# Patient Record
Sex: FEMALE | Race: BLACK OR AFRICAN AMERICAN | NOT HISPANIC OR LATINO | Employment: FULL TIME | URBAN - METROPOLITAN AREA
[De-identification: names, ages, dates, MRNs, and addresses within clinical notes are randomized per-mention and may not be internally consistent; named-entity substitution may affect disease eponyms.]

---

## 2017-04-25 ENCOUNTER — OFFICE VISIT (OUTPATIENT)
Dept: INTERNAL MEDICINE | Facility: CLINIC | Age: 27
End: 2017-04-25
Attending: FAMILY MEDICINE
Payer: MEDICAID

## 2017-04-25 VITALS
HEIGHT: 67 IN | BODY MASS INDEX: 36.61 KG/M2 | WEIGHT: 233.25 LBS | TEMPERATURE: 98 F | HEART RATE: 86 BPM | DIASTOLIC BLOOD PRESSURE: 80 MMHG | SYSTOLIC BLOOD PRESSURE: 98 MMHG

## 2017-04-25 DIAGNOSIS — S16.1XXA NECK STRAIN, INITIAL ENCOUNTER: ICD-10-CM

## 2017-04-25 DIAGNOSIS — Z88.9 DRUG ALLERGY: Primary | ICD-10-CM

## 2017-04-25 PROCEDURE — 99999 PR PBB SHADOW E&M-EST. PATIENT-LVL III: CPT | Mod: PBBFAC,,, | Performed by: FAMILY MEDICINE

## 2017-04-25 PROCEDURE — 99213 OFFICE O/P EST LOW 20 MIN: CPT | Mod: S$PBB,,, | Performed by: FAMILY MEDICINE

## 2017-04-25 PROCEDURE — 99213 OFFICE O/P EST LOW 20 MIN: CPT | Mod: PBBFAC | Performed by: FAMILY MEDICINE

## 2017-04-25 RX ORDER — METHOCARBAMOL 500 MG/1
500 TABLET, FILM COATED ORAL EVERY 8 HOURS PRN
Qty: 20 TABLET | Refills: 0 | Status: SHIPPED | OUTPATIENT
Start: 2017-04-25 | End: 2017-11-30

## 2017-04-25 RX ORDER — FAMCICLOVIR 500 MG/1
TABLET ORAL
Qty: 3 TABLET | Refills: 0 | Status: SHIPPED | OUTPATIENT
Start: 2017-04-25 | End: 2017-11-28

## 2017-04-25 RX ORDER — DIPHENHYDRAMINE HCL 12.5MG/5ML
ELIXIR ORAL EVERY 8 HOURS
COMMUNITY

## 2017-04-25 RX ORDER — AZITHROMYCIN 250 MG/1
TABLET, FILM COATED ORAL
Refills: 0 | COMMUNITY
Start: 2017-04-21 | End: 2017-04-27

## 2017-04-25 NOTE — MR AVS SNAPSHOT
[facility]  [address]  [facility] [address]  Phone: [phone]  Fax: [phone]                  Sarina Ann   2017 9:00 AM   Office Visit    Description:  Female : 1990   Provider:  Anabell Alcantar MD   Department:  [facility]           Reason for Visit     Allergic Reaction     Facial Swelling           Diagnoses this Visit        Comments    Neck strain, initial encounter                To Do List           Goals (5 Years of Data)     None       These Medications        Disp Refills Start End    methocarbamol (ROBAXIN) 500 MG Tab 20 tablet 0 2017     Take 1 tablet (500 mg total) by mouth every 8 (eight) hours as needed. - Oral    Pharmacy: [id number] - [place], [place] - [address] AT [address] Ph #: [phone]       famciclovir (FAMVIR) 500 MG tablet 3 tablet 0 2017     Take 3 tablets po at once for cold sore    Pharmacy: [id number] - [place], [place] - [address] AT [facility] Ph #: [phone]         [facility]     [facility] -  Assistance  Unless otherwise directed by your provider, please contact [facility], our nurse care line that is available for  assistance.     Registered nurses in the [facility] provide: appointment scheduling, clinical advisement, health education, and other advisory services.  Call: [phone] (toll free)               Medications           Message regarding Medications     Verify the changes and/or additions to your medication regime listed below are the same as discussed with your clinician today.  If any of these changes or additions are incorrect, please notify your healthcare provider.        START taking these NEW medications        Refills    famciclovir (FAMVIR) 500 MG tablet 0    Sig: Take 3 tablets po at once for cold sore    Class:  "Normal      STOP taking these medications     ranitidine (ZANTAC) 75 MG tablet Take 1 tablet (75 mg total) by mouth 2 (two) times daily.    benzocaine-menthol (CEPACOL SENSATIONS COOLING) 4-9 mg Lozg 1 lozenge by Mucous Membrane route 4 (four) times daily as needed.           Verify that the below list of medications is an accurate representation of the medications you are currently taking.  If none reported, the list may be blank. If incorrect, please contact your healthcare provider. Carry this list with you in case of emergency.           Current Medications     diphenhydrAMINE (BENADRYL) 12.5 mg/5 mL elixir Take by mouth every 8 (eight) hours.    fluticasone (FLONASE) 50 mcg/actuation nasal spray 1 spray by Each Nare route 2 (two) times daily as needed.    ibuprofen (ADVIL,MOTRIN) 600 MG tablet Take 1 tablet (600 mg total) by mouth every 6 (six) hours as needed for Pain.    naproxen (EC NAPROSYN) 500 MG EC tablet Take 1 tablet (500 mg total) by mouth 2 (two) times daily with meals.    azithromycin (Z-PATRICIA) 250 MG tablet     famciclovir (FAMVIR) 500 MG tablet Take 3 tablets po at once for cold sore    methocarbamol (ROBAXIN) 500 MG Tab Take 1 tablet (500 mg total) by mouth every 8 (eight) hours as needed.           Clinical Reference Information           Your Vitals Were     BP Pulse Temp Height Weight BMI    98/80 (BP Location: Left arm, Patient Position: Sitting, BP Method: Manual) 86 97.9 °F (36.6 °C) (Oral) 5' 7" (1.702 m) 105.8 kg (233 lb 4 oz) 36.53 kg/m2      Blood Pressure          Most Recent Value    BP  98/80      Allergies as of 4/25/2017     No Known Allergies      Immunizations Administered on Date of Encounter - 4/25/2017     None      Language Assistance Services     ATTENTION: Language assistance services are available, free of charge. Please call [phone].      ATENCIÓN: Si sachinla amandamaddi, tiene a agosto disposición servicios gratuitos de asistencia lingüística. Llame al [phone].     RAFAEL " Ý: N?u b?n nói Ti?ng Vi?t, có các d?ch v? h? tr? ngôn ng? mi?n phí dành cho b?n. G?i s? [phone].         [facility] [place], operated by [facility] complies with applicable Federal civil rights laws and does not discriminate on the basis of race, color, national origin, age, disability, or sex.

## 2017-04-25 NOTE — PROGRESS NOTES
Subjective:       Patient ID: Sarina Ann is a 26 y.o. female.    Chief Complaint: Allergic Reaction and Facial Swelling (x 5 days)    HPI Comments: Pt presents today for facial swelling. Per pt, she went to , was given a ceftriaxone shot along with steroids. Subsequently had severe swelling and pt was sent to ED, where she was given benadryl. Pt states that her symptoms improved but still has facial swelling. Pt scared to try anything new and still has some sinus issues. Wonders if she should take the oral abx given by .  Is still taking benadryl ATC. Pt denies any fevers, chills    Allergic Reaction   Pertinent negatives include no chest pain or coughing.     Review of Systems   Constitutional: Negative.    HENT: Positive for congestion, facial swelling, postnasal drip and sinus pressure.    Eyes: Negative.    Respiratory: Negative for cough, chest tightness and shortness of breath.    Cardiovascular: Negative for chest pain, palpitations and leg swelling.   Gastrointestinal: Negative.    Musculoskeletal: Negative.  Negative for joint swelling.   Skin: Negative.    Neurological: Negative for dizziness, weakness, light-headedness and headaches.       Objective:      Physical Exam   Constitutional: She is oriented to person, place, and time. She appears well-developed and well-nourished.   HENT:   Head: Normocephalic and atraumatic.       Right Ear: External ear normal.   Left Ear: External ear normal.   Nose: Nose normal.   Mouth/Throat: Oropharynx is clear and moist. No oropharyngeal exudate.   Eyes: EOM are normal. Pupils are equal, round, and reactive to light.   Neck: Normal range of motion. Neck supple. No thyromegaly present.   Cardiovascular: Normal rate, regular rhythm, normal heart sounds and intact distal pulses.    No murmur heard.  Pulmonary/Chest: Effort normal and breath sounds normal. No respiratory distress.   Abdominal: Soft. Bowel sounds are normal. She exhibits no distension and no  mass. There is no tenderness. There is no rebound and no guarding.   Musculoskeletal: Normal range of motion. She exhibits no edema or tenderness.   Lymphadenopathy:     She has no cervical adenopathy.   Neurological: She is alert and oriented to person, place, and time. She has normal reflexes.   Skin: Skin is warm and dry. No erythema.   Psychiatric: She has a normal mood and affect. Her behavior is normal. Judgment and thought content normal.       Assessment:         2. Sinus congestion  3. Drug reaction  Plan:     drug reaction: explained to pt that i suspect allergy is to abx and not steroid shot. Advised pt to not continue any more abx since her exam was pretty good with some mild sinus congestion due to allergies. Take benadryl, flonase, fluids and NSAIDS prn. Will not give another steroid shot as per pt request. ED prompts d/w pt    F/u with PCP prn

## 2017-04-27 ENCOUNTER — OFFICE VISIT (OUTPATIENT)
Dept: INTERNAL MEDICINE | Facility: CLINIC | Age: 27
End: 2017-04-27
Payer: MEDICAID

## 2017-04-27 VITALS
DIASTOLIC BLOOD PRESSURE: 72 MMHG | SYSTOLIC BLOOD PRESSURE: 98 MMHG | HEIGHT: 64 IN | BODY MASS INDEX: 39.48 KG/M2 | HEART RATE: 98 BPM | WEIGHT: 231.25 LBS | OXYGEN SATURATION: 99 %

## 2017-04-27 DIAGNOSIS — T78.40XD ALLERGIC REACTION, SUBSEQUENT ENCOUNTER: ICD-10-CM

## 2017-04-27 DIAGNOSIS — J06.9 ACUTE URI: Primary | ICD-10-CM

## 2017-04-27 PROCEDURE — 99213 OFFICE O/P EST LOW 20 MIN: CPT | Mod: PBBFAC | Performed by: INTERNAL MEDICINE

## 2017-04-27 PROCEDURE — 99999 PR PBB SHADOW E&M-EST. PATIENT-LVL III: CPT | Mod: PBBFAC,,, | Performed by: INTERNAL MEDICINE

## 2017-04-27 PROCEDURE — 99214 OFFICE O/P EST MOD 30 MIN: CPT | Mod: S$PBB,,, | Performed by: INTERNAL MEDICINE

## 2017-04-27 NOTE — MR AVS SNAPSHOT
[facility]  [address]  [place] [address]  Phone: [phone]  Fax: [phone]                  Sarina Ann   2017 2:00 PM   Office Visit    Description:  Female : 1990   Provider:  Vini Sahu MD   Department:  [facility]           Reason for Visit     Shortness of Breath           Diagnoses this Visit        Comments    Acute URI    -  Primary     Allergic reaction, subsequent encounter                To Do List           Goals (5 Years of Data)     None      Follow-Up and Disposition     Return if symptoms worsen or fail to improve.      [facility]     [facility] Nurse Care Line -  Assistance  Unless otherwise directed by your provider, please contact [facility], our nurse care line that is available for  assistance.     Registered nurses in the [facility] provide: appointment scheduling, clinical advisement, health education, and other advisory services.  Call: [phone] (toll free)               Medications           Message regarding Medications     Verify the changes and/or additions to your medication regime listed below are the same as discussed with your clinician today.  If any of these changes or additions are incorrect, please notify your healthcare provider.        STOP taking these medications     azithromycin (Z-PATRICIA) 250 MG tablet     ibuprofen (ADVIL,MOTRIN) 600 MG tablet Take 1 tablet (600 mg total) by mouth every 6 (six) hours as needed for Pain.    naproxen (EC NAPROSYN) 500 MG EC tablet Take 1 tablet (500 mg total) by mouth 2 (two) times daily with meals.           Verify that the below list of medications is an accurate representation of the medications you are currently taking.  If none reported, the list may be blank. If incorrect, please contact your healthcare provider. Carry this list with you in case of emergency.           Current Medications     diphenhydrAMINE  "(BENADRYL) 12.5 mg/5 mL elixir Take by mouth every 8 (eight) hours.    fluticasone (FLONASE) 50 mcg/actuation nasal spray 1 spray by Each Nare route 2 (two) times daily as needed.    methocarbamol (ROBAXIN) 500 MG Tab Take 1 tablet (500 mg total) by mouth every 8 (eight) hours as needed.    famciclovir (FAMVIR) 500 MG tablet Take 3 tablets po at once for cold sore           Clinical Reference Information           Your Vitals Were     BP Pulse Height Weight Last Period SpO2    98/72 98 5' 4" (1.626 m) 104.9 kg (231 lb 4.2 oz) 04/24/2017 99%    BMI                39.7 kg/m2          Blood Pressure          Most Recent Value    BP  98/72      Allergies as of 4/27/2017     Ceftriaxone      Immunizations Administered on Date of Encounter - 4/27/2017     None      Orders Placed During Today's Visit      Normal Orders This Visit    Ambulatory Referral to Allergy       Instructions      -For the time being please avoid non-steroidal anti-inflammatory medications such as ibuprofen and naproxen.  -Also avoid antibiotics.           Language Assistance Services     ATTENTION: Language assistance services are available, free of charge. Please call [phone].      ATENCIÓN: Si tiffany newton, tiene a agosto disposición servicios gratuitos de asistencia lingüística. Llame al [phone].     RAFAEL Ý: N?u b?n nói Ti?ng Vi?t, có các d?ch v? h? tr? ngôn ng? mi?n phí dành cho b?n. G?i s? [phone].         [facility] complies with applicable Federal civil rights laws and does not discriminate on the basis of race, color, national origin, age, disability, or sex.        "

## 2017-04-27 NOTE — PATIENT INSTRUCTIONS
-For the time being please avoid non-steroidal anti-inflammatory medications such as ibuprofen and naproxen.  -Also avoid antibiotics.

## 2017-04-27 NOTE — PROGRESS NOTES
"Subjective:       Patient ID: Sarina Ann is a 26 y.o. female.    Chief Complaint: Shortness of Breath    HPI Comments:   'I'm just over being sick like this.'      She was seen here also 2d ago - please see that note as well.      Pt has been having URI-type sx which started 7d ago, some of which are ongoing, such as sore throat, L earache and an occasional cough productive of yellow phlegm.      She reports having initially visited an urgent care the day of onset of sx and that she was given both a steroid shot and an antibiotic shot - rocephin.  She reports that she developed sx of an allergic reaction within minutes of receiving the Rocephin (which she had first) and so then went to the ED at [facility] for further tx.  She has been taking frequent Benadryl since then.  Pt says she was 'paralyzed' by the allergic reaction.  She already had some methocarbamol from another time and has been taking that for the soreness in her muscles which she relates to having been 'paralyzed.'  Pt reports temps of 102-105 (not a typo) up until yesterday.  She also gets some sharp, mid-upper chest pain when coughing and also is having some mild LEE.  Nonsmoker.  Denies SOB.          Shortness of Breath       Review of Systems   Constitutional: Negative.    HENT: Negative.    Eyes: Negative.    Respiratory: Positive for shortness of breath.    Cardiovascular: Negative.    Gastrointestinal: Negative.    Genitourinary: Negative.    Musculoskeletal: Negative.    Skin: Negative.    Neurological: Negative.    Psychiatric/Behavioral: Negative.        Objective:       Vitals:    04/27/17 1411   BP: 98/72   Pulse: 98   SpO2: 99%   Weight: 104.9 kg (231 lb 4.2 oz)   Height: 5' 4" (1.626 m)     Physical Exam   Constitutional: She is oriented to person, place, and time. She appears well-developed and well-nourished. No distress.   HENT:   Head: Normocephalic and atraumatic.   Right Ear: Tympanic membrane, external ear and ear canal normal. "   Left Ear: Tympanic membrane, external ear and ear canal normal.   Mouth/Throat: Uvula is midline, oropharynx is clear and moist and mucous membranes are normal. No oropharyngeal exudate or posterior oropharyngeal erythema.   Eyes: Conjunctivae and EOM are normal. Pupils are equal, round, and reactive to light.   Neck: Normal range of motion. Neck supple.   Cardiovascular: Normal rate, regular rhythm and normal heart sounds.  Exam reveals no gallop and no friction rub.    No murmur heard.  Pulmonary/Chest: Effort normal and breath sounds normal. No respiratory distress. She has no wheezes. She has no rhonchi. She has no rales.   Lymphadenopathy:     She has no cervical adenopathy.   Neurological: She is alert and oriented to person, place, and time.   Skin: She is not diaphoretic.       Assessment:       1. Acute URI    2. Allergic reaction, subsequent encounter        Plan:           URI - Normal exam is reassuring.  Presumably viral.  OTC meds PRN.      Allergic reaction - Pt appears to have had an allergic reaction, likely either to Rocephin or perhaps to an NSAID.  Her exam is normal today.  Pt referred again to Allergy.

## 2017-06-21 ENCOUNTER — OFFICE VISIT (OUTPATIENT)
Dept: INTERNAL MEDICINE | Facility: CLINIC | Age: 27
End: 2017-06-21
Attending: FAMILY MEDICINE
Payer: MEDICAID

## 2017-06-21 VITALS
OXYGEN SATURATION: 97 % | BODY MASS INDEX: 37.09 KG/M2 | WEIGHT: 236.31 LBS | SYSTOLIC BLOOD PRESSURE: 92 MMHG | HEART RATE: 85 BPM | HEIGHT: 67 IN | TEMPERATURE: 98 F | DIASTOLIC BLOOD PRESSURE: 74 MMHG

## 2017-06-21 DIAGNOSIS — H60.312 ACUTE DIFFUSE OTITIS EXTERNA OF LEFT EAR: Primary | ICD-10-CM

## 2017-06-21 DIAGNOSIS — Z86.69 HISTORY OF RECURRENT EAR INFECTION: ICD-10-CM

## 2017-06-21 PROCEDURE — 99214 OFFICE O/P EST MOD 30 MIN: CPT | Mod: S$PBB,,, | Performed by: INTERNAL MEDICINE

## 2017-06-21 PROCEDURE — 99999 PR PBB SHADOW E&M-EST. PATIENT-LVL III: CPT | Mod: PBBFAC,,, | Performed by: INTERNAL MEDICINE

## 2017-06-21 PROCEDURE — 99213 OFFICE O/P EST LOW 20 MIN: CPT | Mod: PBBFAC | Performed by: INTERNAL MEDICINE

## 2017-06-21 RX ORDER — TRIAMCINOLONE ACETONIDE 1 MG/G
CREAM TOPICAL 2 TIMES DAILY
Qty: 30 G | Refills: 0 | Status: SHIPPED | OUTPATIENT
Start: 2017-06-21 | End: 2017-11-28

## 2017-06-21 RX ORDER — LORATADINE 10 MG/1
10 TABLET ORAL DAILY
COMMUNITY
End: 2018-03-12

## 2017-06-21 RX ORDER — NEOMYCIN SULFATE, POLYMYXIN B SULFATE AND HYDROCORTISONE 10; 3.5; 1 MG/ML; MG/ML; [USP'U]/ML
3 SUSPENSION/ DROPS AURICULAR (OTIC) 3 TIMES DAILY
Qty: 10 ML | Refills: 0 | Status: SHIPPED | OUTPATIENT
Start: 2017-06-21 | End: 2017-11-28

## 2017-06-21 NOTE — PROGRESS NOTES
"Subjective:       Patient ID: Sarina Ann is a 26 y.o. female.    Chief Complaint: Sinusitis      Pt c/o drainage from L ear for the past 2d.  No pain or itching in her ear.  Her ear feels 'clogged.'  Drainage is clear or faintly yellow.      Pt denies sneezing or cough or sore throat.  She has been having some nasal congestion in the past 2d as well.      She is not taking anything new for these sx.            Review of Systems   Constitutional: Negative.    HENT: Positive for congestion.    Eyes: Negative.    Respiratory: Negative.        Objective:       Vitals:    06/21/17 1028   BP: 92/74   Pulse: 85   Temp: 98.3 °F (36.8 °C)   SpO2: 97%   Weight: 107.2 kg (236 lb 5.3 oz)   Height: 5' 7" (1.702 m)     Physical Exam   Constitutional: She is oriented to person, place, and time. She appears well-developed and well-nourished. No distress.   HENT:   Head: Normocephalic and atraumatic.   Right Ear: Tympanic membrane, external ear and ear canal normal.   Left Ear: Tympanic membrane is scarred. Tympanic membrane is not perforated, not erythematous, not retracted and not bulging.   Mouth/Throat: Uvula is midline, oropharynx is clear and moist and mucous membranes are normal. No oropharyngeal exudate or posterior oropharyngeal erythema.   R ear - There is some dried, crusted material on outer part of ear, as well as a shallow erosion at outer edge of canal, just inferior and posterior to tragus.  There is also similar material in external canal, along with mild inflammation of the canal walls   Eyes: Conjunctivae and EOM are normal. Pupils are equal, round, and reactive to light.   Neck: Normal range of motion. Neck supple.   Cardiovascular: Normal rate, regular rhythm and normal heart sounds.  Exam reveals no gallop and no friction rub.    No murmur heard.  Pulmonary/Chest: Effort normal and breath sounds normal. No respiratory distress. She has no wheezes. She has no rhonchi. She has no rales.   Lymphadenopathy: "     She has no cervical adenopathy.   Neurological: She is alert and oriented to person, place, and time.   Skin: She is not diaphoretic.       Assessment:       1. Acute diffuse otitis externa of left ear    2. History of recurrent ear infection        Plan:           L otitis externa - Pt was given drops for this, as well as TAC for the shallow erosion right outside of her canal and next to base of tragus.  Pt reports recurrent infections w/L ear and was referred to ENT for these.

## 2017-10-26 ENCOUNTER — OFFICE VISIT (OUTPATIENT)
Dept: INTERNAL MEDICINE | Facility: CLINIC | Age: 27
End: 2017-10-26
Payer: MEDICAID

## 2017-10-26 VITALS
HEART RATE: 105 BPM | TEMPERATURE: 98 F | OXYGEN SATURATION: 98 % | BODY MASS INDEX: 36.21 KG/M2 | WEIGHT: 230.69 LBS | DIASTOLIC BLOOD PRESSURE: 72 MMHG | HEIGHT: 67 IN | SYSTOLIC BLOOD PRESSURE: 90 MMHG

## 2017-10-26 DIAGNOSIS — B96.89 BACTERIAL SINUSITIS: Primary | ICD-10-CM

## 2017-10-26 DIAGNOSIS — J32.9 BACTERIAL SINUSITIS: Primary | ICD-10-CM

## 2017-10-26 DIAGNOSIS — J30.89 NON-SEASONAL ALLERGIC RHINITIS, UNSPECIFIED CHRONICITY, UNSPECIFIED TRIGGER: ICD-10-CM

## 2017-10-26 PROCEDURE — 99999 PR PBB SHADOW E&M-EST. PATIENT-LVL IV: CPT | Mod: PBBFAC,,, | Performed by: NURSE PRACTITIONER

## 2017-10-26 PROCEDURE — 99213 OFFICE O/P EST LOW 20 MIN: CPT | Mod: S$PBB,,, | Performed by: NURSE PRACTITIONER

## 2017-10-26 PROCEDURE — 99214 OFFICE O/P EST MOD 30 MIN: CPT | Mod: PBBFAC | Performed by: NURSE PRACTITIONER

## 2017-10-26 RX ORDER — AZITHROMYCIN 250 MG/1
TABLET, FILM COATED ORAL
Qty: 6 TABLET | Refills: 0 | Status: SHIPPED | OUTPATIENT
Start: 2017-10-26 | End: 2017-10-26 | Stop reason: SDUPTHER

## 2017-10-26 RX ORDER — AZITHROMYCIN 250 MG/1
TABLET, FILM COATED ORAL
Qty: 6 TABLET | Refills: 0 | Status: SHIPPED | OUTPATIENT
Start: 2017-10-26 | End: 2017-10-31

## 2017-10-26 NOTE — PROGRESS NOTES
Subjective:       Patient ID: Sarina Ann is a 26 y.o. female.    Chief Complaint: URI    Sarina Ann seen in urgent care for URI symptoms x 3 weeks.     Reports headaaches, sinus pressure and pain, sore throat, post nasal drip, and fevers tmax 101 at home. Pt has known allergic rhinitis poorly controlled on daily Zyrtec and Flonase. Admits she has not been taking Zyrtec and takes Flonase once to twice daily. Was referred to Allergist by PCP before but did not think it was necessary to go. Denies chest pains, SOB, or LEE. No sick contacts, recent travel, or abx use.       URI    This is a new problem. The current episode started 1 to 4 weeks ago. The problem has been gradually worsening. The maximum temperature recorded prior to her arrival was 101 - 101.9 F. The fever has been present for 3 to 4 days. Associated symptoms include congestion, ear pain, headaches, a plugged ear sensation, rhinorrhea, sinus pain and a sore throat. Pertinent negatives include no chest pain, joint pain, joint swelling, nausea, sneezing, vomiting or wheezing. She has tried NSAIDs for the symptoms. The treatment provided mild relief.     Review of Systems   Constitutional: Positive for appetite change, chills and fever.   HENT: Positive for congestion, ear pain, postnasal drip, rhinorrhea, sinus pain, sinus pressure and sore throat. Negative for ear discharge, facial swelling, nosebleeds, sneezing and trouble swallowing.    Eyes: Negative for photophobia.   Respiratory: Negative for chest tightness, shortness of breath and wheezing.    Cardiovascular: Negative for chest pain, palpitations and leg swelling.   Gastrointestinal: Negative for nausea and vomiting.   Genitourinary: Negative.    Musculoskeletal: Negative for arthralgias, joint pain and myalgias.   Neurological: Positive for headaches. Negative for dizziness, weakness and light-headedness.   Psychiatric/Behavioral: Negative for dysphoric mood.       Objective:       Physical Exam   Constitutional: She is oriented to person, place, and time. She appears well-developed and well-nourished.   HENT:   Head: Normocephalic and atraumatic.   Right Ear: Hearing, tympanic membrane, external ear and ear canal normal.   Left Ear: Hearing, tympanic membrane, external ear and ear canal normal.   Nose: Mucosal edema, rhinorrhea and sinus tenderness present. Right sinus exhibits maxillary sinus tenderness and frontal sinus tenderness. Left sinus exhibits maxillary sinus tenderness and frontal sinus tenderness.   Mouth/Throat: Oropharynx is clear and moist. Mucous membranes are cyanotic. No oropharyngeal exudate, posterior oropharyngeal edema or posterior oropharyngeal erythema. Tonsils are 1+ on the right. Tonsils are 1+ on the left. No tonsillar exudate.   Eyes: Conjunctivae and EOM are normal. Pupils are equal, round, and reactive to light. Right eye exhibits no discharge. Left eye exhibits no discharge. No scleral icterus.   Neck: Normal range of motion. Neck supple.   Cardiovascular: Normal rate, regular rhythm, normal heart sounds and intact distal pulses.    Pulmonary/Chest: Effort normal and breath sounds normal. No respiratory distress.   Abdominal: Soft. Bowel sounds are normal.   Musculoskeletal: Normal range of motion.   Lymphadenopathy:     She has no cervical adenopathy.   Neurological: She is alert and oriented to person, place, and time.   Skin: Skin is warm and dry. Capillary refill takes less than 2 seconds.   Psychiatric: Her behavior is normal.   Vitals reviewed.      Assessment:       1. Bacterial sinusitis    2. Non-seasonal allergic rhinitis, unspecified chronicity, unspecified trigger        Plan:       Explicit discussion regarding compliance with allergy meds and Allergist referral  Resume OTC antihistamine and Flonase BID  Zpak; discussed proper use and side effects with patient  F/u PCP failure to improve

## 2017-10-30 ENCOUNTER — NURSE TRIAGE (OUTPATIENT)
Dept: ADMINISTRATIVE | Facility: CLINIC | Age: 27
End: 2017-10-30

## 2017-10-30 ENCOUNTER — HOSPITAL ENCOUNTER (EMERGENCY)
Facility: OTHER | Age: 27
Discharge: HOME | End: 2017-10-30
Attending: EMERGENCY MEDICINE
Payer: MEDICAID

## 2017-10-30 VITALS
BODY MASS INDEX: 35.94 KG/M2 | WEIGHT: 229 LBS | OXYGEN SATURATION: 100 % | TEMPERATURE: 99 F | HEART RATE: 104 BPM | SYSTOLIC BLOOD PRESSURE: 115 MMHG | HEIGHT: 67 IN | RESPIRATION RATE: 18 BRPM | DIASTOLIC BLOOD PRESSURE: 70 MMHG

## 2017-10-30 DIAGNOSIS — K75.9 HEPATITIS: Primary | ICD-10-CM

## 2017-10-30 DIAGNOSIS — J06.9 VIRAL URI: ICD-10-CM

## 2017-10-30 DIAGNOSIS — R11.2 NON-INTRACTABLE VOMITING WITH NAUSEA, UNSPECIFIED VOMITING TYPE: ICD-10-CM

## 2017-10-30 LAB
ALBUMIN SERPL BCP-MCNC: 3.1 G/DL
ALP SERPL-CCNC: 142 U/L
ALT SERPL W/O P-5'-P-CCNC: 566 U/L
ANION GAP SERPL CALC-SCNC: 8 MMOL/L
AST SERPL-CCNC: 436 U/L
B-HCG UR QL: NEGATIVE
BASOPHILS # BLD AUTO: 0.13 K/UL
BASOPHILS NFR BLD: 1.5 %
BILIRUB SERPL-MCNC: 0.8 MG/DL
BILIRUB UR QL STRIP: NEGATIVE
BUN SERPL-MCNC: 5 MG/DL
CALCIUM SERPL-MCNC: 9 MG/DL
CHLORIDE SERPL-SCNC: 101 MMOL/L
CLARITY UR: CLEAR
CO2 SERPL-SCNC: 26 MMOL/L
COLOR UR: YELLOW
CREAT SERPL-MCNC: 0.9 MG/DL
CTP QC/QA: YES
DIFFERENTIAL METHOD: ABNORMAL
EOSINOPHIL # BLD AUTO: 0 K/UL
EOSINOPHIL NFR BLD: 0.4 %
ERYTHROCYTE [DISTWIDTH] IN BLOOD BY AUTOMATED COUNT: 14.4 %
EST. GFR  (AFRICAN AMERICAN): >60 ML/MIN/1.73 M^2
EST. GFR  (NON AFRICAN AMERICAN): >60 ML/MIN/1.73 M^2
FLUAV AG SPEC QL IA: NEGATIVE
FLUBV AG SPEC QL IA: NEGATIVE
GLUCOSE SERPL-MCNC: 90 MG/DL
GLUCOSE UR QL STRIP: NEGATIVE
HCT VFR BLD AUTO: 39.5 %
HETEROPH AB SERPL QL IA: NEGATIVE
HGB BLD-MCNC: 12.8 G/DL
HGB UR QL STRIP: NEGATIVE
KETONES UR QL STRIP: NEGATIVE
LEUKOCYTE ESTERASE UR QL STRIP: NEGATIVE
LIPASE SERPL-CCNC: 15 U/L
LYMPHOCYTES # BLD AUTO: 4.6 K/UL
LYMPHOCYTES NFR BLD: 50.8 %
MCH RBC QN AUTO: 29.6 PG
MCHC RBC AUTO-ENTMCNC: 32.4 G/DL
MCV RBC AUTO: 91 FL
MONOCYTES # BLD AUTO: 0.6 K/UL
MONOCYTES NFR BLD: 6.6 %
NEUTROPHILS # BLD AUTO: 3.6 K/UL
NEUTROPHILS NFR BLD: 40.7 %
NITRITE UR QL STRIP: NEGATIVE
PH UR STRIP: 6 [PH] (ref 5–8)
PLATELET # BLD AUTO: 222 K/UL
PMV BLD AUTO: 9.8 FL
POTASSIUM SERPL-SCNC: 4 MMOL/L
PROT SERPL-MCNC: 7.5 G/DL
PROT UR QL STRIP: NEGATIVE
RBC # BLD AUTO: 4.32 M/UL
SODIUM SERPL-SCNC: 135 MMOL/L
SP GR UR STRIP: 1.01 (ref 1–1.03)
SPECIMEN SOURCE: NORMAL
URN SPEC COLLECT METH UR: ABNORMAL
UROBILINOGEN UR STRIP-ACNC: ABNORMAL EU/DL
WBC # BLD AUTO: 8.96 K/UL

## 2017-10-30 PROCEDURE — 63600175 PHARM REV CODE 636 W HCPCS: Performed by: PHYSICIAN ASSISTANT

## 2017-10-30 PROCEDURE — 81025 URINE PREGNANCY TEST: CPT | Performed by: EMERGENCY MEDICINE

## 2017-10-30 PROCEDURE — 96374 THER/PROPH/DIAG INJ IV PUSH: CPT

## 2017-10-30 PROCEDURE — 87400 INFLUENZA A/B EACH AG IA: CPT | Mod: 59

## 2017-10-30 PROCEDURE — 36415 COLL VENOUS BLD VENIPUNCTURE: CPT

## 2017-10-30 PROCEDURE — 80053 COMPREHEN METABOLIC PANEL: CPT

## 2017-10-30 PROCEDURE — 96361 HYDRATE IV INFUSION ADD-ON: CPT

## 2017-10-30 PROCEDURE — 85025 COMPLETE CBC W/AUTO DIFF WBC: CPT

## 2017-10-30 PROCEDURE — 86308 HETEROPHILE ANTIBODY SCREEN: CPT

## 2017-10-30 PROCEDURE — 81003 URINALYSIS AUTO W/O SCOPE: CPT

## 2017-10-30 PROCEDURE — 83690 ASSAY OF LIPASE: CPT

## 2017-10-30 PROCEDURE — 25000003 PHARM REV CODE 250: Performed by: PHYSICIAN ASSISTANT

## 2017-10-30 PROCEDURE — 99284 EMERGENCY DEPT VISIT MOD MDM: CPT | Mod: 25

## 2017-10-30 PROCEDURE — 80074 ACUTE HEPATITIS PANEL: CPT

## 2017-10-30 RX ORDER — FAMOTIDINE 20 MG/1
20 TABLET, FILM COATED ORAL 2 TIMES DAILY
Qty: 20 TABLET | Refills: 0 | Status: SHIPPED | OUTPATIENT
Start: 2017-10-30 | End: 2017-11-28

## 2017-10-30 RX ORDER — ONDANSETRON 4 MG/1
4 TABLET, FILM COATED ORAL EVERY 6 HOURS
Qty: 12 TABLET | Refills: 0 | Status: SHIPPED | OUTPATIENT
Start: 2017-10-30 | End: 2017-11-28

## 2017-10-30 RX ORDER — ONDANSETRON 2 MG/ML
4 INJECTION INTRAMUSCULAR; INTRAVENOUS
Status: COMPLETED | OUTPATIENT
Start: 2017-10-30 | End: 2017-10-30

## 2017-10-30 RX ADMIN — ONDANSETRON HYDROCHLORIDE 4 MG: 2 INJECTION INTRAMUSCULAR; INTRAVENOUS at 12:10

## 2017-10-30 RX ADMIN — SODIUM CHLORIDE 1000 ML: 0.9 INJECTION, SOLUTION INTRAVENOUS at 12:10

## 2017-10-30 NOTE — ED TRIAGE NOTES
Pt reports cough, congestion, fevers ( 103.4 this morning), n/v and body aches since Thursday. She was prescribed a z-pac on Thursday for a sinus infection without improvement in symptoms. Fever resolved with ibuprofen.

## 2017-10-30 NOTE — TELEPHONE ENCOUNTER
Reason for Disposition   MILD to MODERATE vomiting (e.g., 1-5 times/day) and lasts > 48 hours (2 days)    Protocols used: ST VOMITING-A-OH    Pt started taking a Zpak a few days ago for a sinus infection. Since then she has been throwing up everything but is urinating appropriately. Pt denies constant abdominal pain. Also reports fever on and off for couple of days Advised to see MD today

## 2017-10-30 NOTE — ED PROVIDER NOTES
Encounter Date: 10/30/2017       History     Chief Complaint   Patient presents with    General Illness     pt reports headache, abd pain, n/v, sinus congsetion since Saturday. Started Z-pack on Thursday for sinus infection. reports fever of 103.4 on 1am last night relieved with ibuprofen     Patient is a 26-year-old female with no significant past medical history who presents to the ED with vomiting and abdominal pain.  She states she was diagnosed with sinusitis last week and was given a Z-Jamir that she did not finish taking.  She states 3 days ago she developed epigastric pain and fever.  She then had subsequent nausea and vomiting for the past 3 days.  She states she is unable to tolerate fluids and solids.  She denies hematemesis.  She reports a TMax of 103.4 this morning and states she took Tylenol at approximately 5 AM this morning. She states she had to leave work today secondary to vomiting.  She states her congestion and sore throat is resolved.      The history is provided by the patient.     Review of patient's allergies indicates:   Allergen Reactions    Ceftriaxone Edema     No past medical history on file.  Past Surgical History:   Procedure Laterality Date    NO PAST SURGERIES       Family History   Problem Relation Age of Onset    Colon cancer Father     Diabetes Mother     Hypertension Mother     Breast cancer Neg Hx     Cancer Neg Hx     Eclampsia Neg Hx     Miscarriages / Stillbirths Neg Hx     Ovarian cancer Neg Hx      labor Neg Hx     Stroke Neg Hx      Social History   Substance Use Topics    Smoking status: Never Smoker    Smokeless tobacco: Not on file    Alcohol use Yes      Comment: social     Review of Systems    Physical Exam     Initial Vitals [10/30/17 1111]   BP Pulse Resp Temp SpO2   119/78 (!) 116 18 98.2 °F (36.8 °C) 99 %      MAP       91.67             Physical Exam    Constitutional: She is cooperative.   Obese, -American female in no acute  distress.  She is nontoxic appearing.  He speaks in clear, full sentences.   HENT:   Head: Normocephalic and atraumatic.   Dry mucous membranes.  No tonsillar swelling or exudates.  Uvula is midline.  TM is without erythema, bulging or effusion bilaterally.  Erythematous nasal turbinates.   Eyes: EOM are normal. Pupils are equal, round, and reactive to light.   No conjunctival pallor.   Neck: Normal range of motion. Neck supple.   Cardiovascular: Regular rhythm and intact distal pulses.   Tachycardic at 115 bpm   Pulmonary/Chest: Breath sounds normal. She has no wheezes. She has no rhonchi. She has no rales.   Abdominal: Soft. Bowel sounds are normal.   Tenderness to palpation in epigastric region.  Negative Bond sign.  No rebound tenderness or guarding.   Musculoskeletal: Normal range of motion. She exhibits no edema.   Neurological: She is alert. She has normal strength. No cranial nerve deficit. GCS eye subscore is 4. GCS verbal subscore is 5. GCS motor subscore is 6.   Skin: Skin is warm and dry. No rash noted.   Psychiatric: She has a normal mood and affect. Her behavior is normal.         ED Course   Procedures  Labs Reviewed   URINALYSIS - Abnormal; Notable for the following:        Result Value    Urobilinogen, UA 2.0-3.0 (*)     All other components within normal limits   CBC W/ AUTO DIFFERENTIAL - Abnormal; Notable for the following:     Lymph% 50.8 (*)     All other components within normal limits   COMPREHENSIVE METABOLIC PANEL - Abnormal; Notable for the following:     Sodium 135 (*)     BUN, Bld 5 (*)     Albumin 3.1 (*)     Alkaline Phosphatase 142 (*)      (*)      (*)     All other components within normal limits   INFLUENZA A AND B ANTIGEN   LIPASE   HEPATITIS PANEL, ACUTE   HETEROPHILE AB SCREEN   HETEROPHILE AB SCREEN   HEPATITIS PANEL, ACUTE   POCT URINE PREGNANCY         Imaging Results          US Abdomen Limited (Final result)  Result time 10/30/17 14:24:35    Final result by  Edilia Bartlett MD (10/30/17 14:24:35)                 Impression:        No gallstones or evidence of acute cholecystitis.      Electronically signed by: EDILIA BARTLETT MD, MD  Date:     10/30/17  Time:    14:24              Narrative:    Comparison: None.    Findings: Right upper quadrant ultrasound performed.  The liver measures 17 cm in length and is homogeneous in echogenicity.  Common bile duct is within normal limits at 0.4 cm.  No gallstones, gallbladder wall thickening, pericholecystic fluid or focal tenderness over the gallbladder.  The visualized portions of the pancreas, IVC and abdominal aorta are within normal limits.  The right kidney is normal in length measuring 10.8cm. No right sided hydronephrosis or renal masses identified.   No ascites in the imaged region.                                 Medical Decision Making:   Initial Assessment:   Urgent evaluation of a 26 y.o. female presenting to the emergency department complaining of abdominal pain and vomitng. Patient is afebrile, nontoxic appearing and hemodynamically stable.  ED Management:  Patient appears mildly dehydrated.  I'll give her IVF and Zofran.  I will obtain basic labs.  She is mildly tachycardic and afebrile.  Lab work reveals no leukocytosis and no significant electrolyte abnormalities.  Her LFTs and alkaline phosphatase are elevated.  Lipase is within normal limits.  Influenza is negative.  Urinalysis is negative for UTI but does reveal urobilinogen. RUQ ultrasound does not reveal gallstones or acute cholecystitis.  The patient's tachycardia and symptoms improved interventions here in the ED.  She remained afebrile here. I will add on a Monospot and hepatitis panel.  I have advised her to take ibuprofen instead of Tylenol for her fever.  I have given her specific return precautions.  She has no point with her PCP next week.  I have discussed the patient with the attending thoroughly and he/she agrees to the treatment and plan.   Other:    I have discussed this case with another health care provider.  This note was created using Dragon Medical Dictation. There may be typographical errors secondary to dictation.               Attending Attestation:     Physician Attestation Statement for NP/PA:   I have conducted a face to face encounter with this patient in addition to the NP/PA, due to    Other NP/PA Attestation Additions:    History of Present Illness: 26-year-old female presents with complaint of abdominal pain with recent nausea and vomiting after viral URI.   Physical Exam: Nontoxic, well-hydrated appearance.    PA states on her exam abdominal tenderness in epigastrium but no Bond's.   Medical Decision Making: Labs show hepatitis, normal lipase, normal right upper quadrant ultrasound.  Patient admits she's been taking Tylenol for fever, no recent alcohol use.  I'm concerned for viral hepatitis.  Acute hepatitis panel and Monospot sent and pending at time of discharge.  She has follow-up scheduled with PCP in 10 days, can follow-up these results and repeat LFTs.  She is advised to return for any new or worsening symptoms in the interim.                 ED Course      Clinical Impression:     1. Hepatitis    2. Viral URI    3. Non-intractable vomiting with nausea, unspecified vomiting type                             CHUCKIE Arevalo-C  10/30/17 5194       Etelvina Phipps MD  10/30/17 1608

## 2017-10-30 NOTE — ED NOTES
"AAOx4, pt c/o 5/10 pain to abdomen, Pt states., "I just feel nauseated", Resp. Even & unlabored, skin turgor good, Call light within reach, POC updated.  "

## 2017-10-31 LAB
HAV IGM SERPL QL IA: NEGATIVE
HBV CORE IGM SERPL QL IA: NEGATIVE
HBV SURFACE AG SERPL QL IA: NEGATIVE
HCV AB SERPL QL IA: NEGATIVE

## 2017-11-15 ENCOUNTER — TELEPHONE (OUTPATIENT)
Dept: INTERNAL MEDICINE | Facility: CLINIC | Age: 27
End: 2017-11-15

## 2017-11-28 ENCOUNTER — OFFICE VISIT (OUTPATIENT)
Dept: ALLERGY | Facility: CLINIC | Age: 27
End: 2017-11-28
Payer: MEDICAID

## 2017-11-28 VITALS — WEIGHT: 229.75 LBS | BODY MASS INDEX: 36.06 KG/M2 | HEIGHT: 67 IN | HEART RATE: 60 BPM

## 2017-11-28 DIAGNOSIS — T78.3XXA ANGIOEDEMA, INITIAL ENCOUNTER: ICD-10-CM

## 2017-11-28 DIAGNOSIS — Z86.19 FREQUENT INFECTIONS: ICD-10-CM

## 2017-11-28 DIAGNOSIS — J31.0 CHRONIC RHINITIS, UNSPECIFIED TYPE: Primary | ICD-10-CM

## 2017-11-28 DIAGNOSIS — H10.423 SIMPLE CHRONIC CONJUNCTIVITIS OF BOTH EYES: ICD-10-CM

## 2017-11-28 PROCEDURE — 99213 OFFICE O/P EST LOW 20 MIN: CPT | Mod: PBBFAC | Performed by: ALLERGY & IMMUNOLOGY

## 2017-11-28 PROCEDURE — 99999 PR PBB SHADOW E&M-EST. PATIENT-LVL III: CPT | Mod: PBBFAC,,, | Performed by: ALLERGY & IMMUNOLOGY

## 2017-11-28 PROCEDURE — 99204 OFFICE O/P NEW MOD 45 MIN: CPT | Mod: S$PBB,,, | Performed by: ALLERGY & IMMUNOLOGY

## 2017-11-28 NOTE — LETTER
November 28, 2017      Irina Jara, NP  [address]  [address]  [facility] [address]           [facility]  [address]  [facility] [address]  Phone: [phone]  Fax: [phone]          Patient: Sarina Ann   MR Number: [id number]   YOB: 1990   Date of Visit: 11/28/2017       Dear Irina Jara:    Thank you for referring Sarina Ann to me for evaluation. Attached you will find relevant portions of my assessment and plan of care.    If you have questions, please do not hesitate to call me. I look forward to following Sarina Ann along with you.    Sincerely,    ZHENG Wagner III, MD    Enclosure  CC:  No Recipients    If you would like to receive this communication electronically, please contact [email or web address] or [phone] to request more information on [employer] access.    For providers and/or their staff who would like to refer a patient to [facility], please contact us through our one-stop-shop provider referral line, [facility], at [phone].    If you feel you have received this communication in error or would no longer like to receive these types of communications, please e-mail [email or web address]

## 2017-11-29 NOTE — PROGRESS NOTES
"Sarina Ann is referred by LEIDY Jara for a consult regarding a possible allergic reaction. She is here alone.    She says that she gets frequent upper respiratory infections. She has about 3 "sinus infections" a year. This past year she has had 3 or 4 episodes of strep pharyngitis that have been positive by culture.    She usually goes to the [facility] urgent care center on [address] that is no longer open.    Last April, she had another "sinus infection". She presented to [facility] and received IM steroids and Rocephin.    She developed "stomach tightening" and was watched for. Of time. She was released and she got in her car she started having tingling of her face. She then could not move her mouth and her "hands locked up". She used Elva to call her mother who then called the urgent care center. They came to her car and brought her back into the clinic by wheelchair.    She then became "paralyzed all over". She was aware of her surroundings but could not move.     She was transported to [facility] where she was evaluated and discharged that day. She is not aware of any diagnosis that was made. Her "paralysis" did resolve.    She then saw Dr. Alcantar on April 25, 2017 with a history of mild swelling for 5 days. Upper lip swelling was noted on her exam. She was advised to avoid further antibiotics.    Her symptoms and swelling did resolve with Benadryl. She has not needed since. She has had no further lip swelling. She denies any urticaria.    She again developed another "sinusitis" and was prescribed a Z-Jamir on October 26, 2017. After 3 days of taking this she developed some "stomach tightening" and tingling of her face. She discontinued this antibiotic.    She was seen in the [place] emergency room with abdominal pain and vomiting. She was diagnosed with dehydration and treated with fluids. Her liver enzymes were elevated at that time. She has not had these repeated.    She is seeing Dr. Giuseppe Campo as " "her primary care physician on Thursday.    In addition she has pruritus of the nose, eyes, ears, and throat, eye tearing, sneezing, and sore throats. She takes OTC antihistamines with improvement. She does have Flonase but does not use regularly. Her symptoms are worse with dust, grass, and strong odors. They are worse in spring.    She denies any cough, wheezing, or shortness of breath. She denies any history of asthma.    For ROS, FH, SH please see Allergy and Asthma Questionnaire dated today.    Some relevant pertinent positives:    Review of Systems:  She denies any indigestion or heartburn. She has recurrent constipation.    Family History: Negative for atopic disease.    Home environment: She has lived in the same house for the past 19 years. There was no water damage. There is no evidence of mold. There is no carpeting in the bedroom. There are no pets.    Social History: She is a nonsmoker. She is a .    Physical Examination:  General: Well-developed, well-nourished, no acute distress.  Head: No sinus tenderness.  Eyes: Conjunctivae:  No bulbar or palpebral conjunctival injection.  Ears: EAC's clear.  TM's clear.  No pre-auricular nodes.  Nose: Nasal Mucosa:  Pink.  Septum: No apparent deviation.  Turbinates:  No significant edema.  Polyps/Mass:  None visible.  Teeth/Gums:  No bleeding noted.  Oropharynx: No exudates.  Neck: Supple without thyromegaly. No cervical lymphadenopathy.    Respiratory/Chest: Effort: Good.  Auscultation:  Clear bilaterally.  Cardiovascular:  No murmur, rubs, or gallop heard.   GI:  Non-tender.  No masses.  No organomegaly.  Extremities:  No swelling.  No cyanosis, clubbing, or edema.  Skin: Good turgor.  No urticaria or angioedema.  Neuro/Psych: Oriented x 3.    Assessment:  1. Chronic rhinitis, consider allergic.  2. Chronic conjunctivitis, consider allergic.  3. History of angioedema, probably secondary to Rocephin.  4. History of "paralysis", consider conversion " reaction or adverse reaction to Rocephin.  5. Recurrent upper respiratory infections and history of recurrent strep pharyngitis.  6. Recent elevated liver enzymes of uncertain etiology.    Recommendations:  1. Laboratory as ordered.  2. Avoid Rocephin.  3. Obtain records from [facility] urgent care center and [facility].  4. IM evaluation as scheduled.  5. Return to clinic in 2 weeks.

## 2017-11-30 ENCOUNTER — OFFICE VISIT (OUTPATIENT)
Dept: INTERNAL MEDICINE | Facility: CLINIC | Age: 27
End: 2017-11-30
Payer: MEDICAID

## 2017-11-30 VITALS
HEART RATE: 87 BPM | HEIGHT: 66 IN | DIASTOLIC BLOOD PRESSURE: 70 MMHG | BODY MASS INDEX: 36.21 KG/M2 | SYSTOLIC BLOOD PRESSURE: 98 MMHG | WEIGHT: 225.31 LBS

## 2017-11-30 DIAGNOSIS — Z13.6 ENCOUNTER FOR LIPID SCREENING FOR CARDIOVASCULAR DISEASE: ICD-10-CM

## 2017-11-30 DIAGNOSIS — R79.89 ELEVATED LFTS: Primary | ICD-10-CM

## 2017-11-30 DIAGNOSIS — Z13.220 ENCOUNTER FOR LIPID SCREENING FOR CARDIOVASCULAR DISEASE: ICD-10-CM

## 2017-11-30 DIAGNOSIS — R10.2 VAGINAL PAIN: ICD-10-CM

## 2017-11-30 PROCEDURE — 99213 OFFICE O/P EST LOW 20 MIN: CPT | Mod: S$PBB,,, | Performed by: INTERNAL MEDICINE

## 2017-11-30 PROCEDURE — 99213 OFFICE O/P EST LOW 20 MIN: CPT | Mod: PBBFAC | Performed by: INTERNAL MEDICINE

## 2017-11-30 PROCEDURE — 99999 PR PBB SHADOW E&M-EST. PATIENT-LVL III: CPT | Mod: PBBFAC,,, | Performed by: INTERNAL MEDICINE

## 2017-11-30 NOTE — PROGRESS NOTES
"Subjective:       Patient ID: Sarina Ann is a 27 y.o. female.    Chief Complaint: Abnormal Lab    Pt is here for f/u from ED where she was seen for viral gastroenteritis but found to have elevated LFTs. She has since had repeat labs and were normal. She feels well and has no c/o currently except for "boil" in vaginal area present for a couple of days. No fever or drainage. Has not seen GYN yet.            Review of Systems   Constitutional: Negative for fatigue and unexpected weight change.   Eyes: Negative for visual disturbance.   Respiratory: Negative for shortness of breath.    Cardiovascular: Negative for chest pain.   Neurological: Negative for numbness and headaches.       Objective:      Physical Exam   Constitutional: She is oriented to person, place, and time. She appears well-developed and well-nourished.   HENT:   Head: Normocephalic and atraumatic.   Eyes: EOM are normal. Pupils are equal, round, and reactive to light.   Neck: Carotid bruit is not present. No thyroid mass and no thyromegaly present.   Cardiovascular: Normal rate, regular rhythm, S1 normal, S2 normal and normal heart sounds.    No murmur heard.  Pulmonary/Chest: Effort normal and breath sounds normal. She has no wheezes.   Abdominal: Soft. Bowel sounds are normal. She exhibits no distension and no mass. There is no hepatosplenomegaly. There is no tenderness.   Lymphadenopathy:     She has no cervical adenopathy.   Neurological: She is alert and oriented to person, place, and time. No cranial nerve deficit.   Psychiatric: She has a normal mood and affect. Judgment normal.       Assessment:       1. Elevated LFTs    2. Encounter for lipid screening for cardiovascular disease    3. Vaginal pain        Plan:       1. Elevated LFTs normalized  2. GYN referral       "

## 2017-12-06 ENCOUNTER — LAB VISIT (OUTPATIENT)
Dept: LAB | Facility: OTHER | Age: 27
End: 2017-12-06
Attending: INTERNAL MEDICINE
Payer: MEDICAID

## 2017-12-06 DIAGNOSIS — Z13.220 ENCOUNTER FOR LIPID SCREENING FOR CARDIOVASCULAR DISEASE: ICD-10-CM

## 2017-12-06 DIAGNOSIS — Z13.6 ENCOUNTER FOR LIPID SCREENING FOR CARDIOVASCULAR DISEASE: ICD-10-CM

## 2017-12-06 LAB
CHOLEST SERPL-MCNC: 146 MG/DL
CHOLEST/HDLC SERPL: 3.9 {RATIO}
HDLC SERPL-MCNC: 37 MG/DL
HDLC SERPL: 25.3 %
LDLC SERPL CALC-MCNC: 94 MG/DL
NONHDLC SERPL-MCNC: 109 MG/DL
TRIGL SERPL-MCNC: 75 MG/DL

## 2017-12-06 PROCEDURE — 36415 COLL VENOUS BLD VENIPUNCTURE: CPT

## 2017-12-06 PROCEDURE — 80061 LIPID PANEL: CPT

## 2017-12-13 ENCOUNTER — OFFICE VISIT (OUTPATIENT)
Dept: UROGYNECOLOGY | Facility: CLINIC | Age: 27
End: 2017-12-13
Payer: MEDICAID

## 2017-12-13 VITALS
BODY MASS INDEX: 34.81 KG/M2 | HEIGHT: 67 IN | DIASTOLIC BLOOD PRESSURE: 72 MMHG | SYSTOLIC BLOOD PRESSURE: 100 MMHG | WEIGHT: 221.81 LBS

## 2017-12-13 DIAGNOSIS — Z12.4 ENCOUNTER FOR SCREENING FOR CERVICAL CANCER: Primary | ICD-10-CM

## 2017-12-13 DIAGNOSIS — N90.89: ICD-10-CM

## 2017-12-13 DIAGNOSIS — Z01.419 WELL WOMAN EXAM: ICD-10-CM

## 2017-12-13 PROCEDURE — 99999 PR PBB SHADOW E&M-EST. PATIENT-LVL III: CPT | Mod: PBBFAC,,, | Performed by: NURSE PRACTITIONER

## 2017-12-13 PROCEDURE — 99213 OFFICE O/P EST LOW 20 MIN: CPT | Mod: PBBFAC | Performed by: NURSE PRACTITIONER

## 2017-12-13 PROCEDURE — 88175 CYTOPATH C/V AUTO FLUID REDO: CPT

## 2017-12-13 PROCEDURE — 99395 PREV VISIT EST AGE 18-39: CPT | Mod: S$PBB,,, | Performed by: NURSE PRACTITIONER

## 2017-12-13 NOTE — PATIENT INSTRUCTIONS
1. Well woman  --pap today  --takes 2-3 weeks for results.    2. Caruncle  --warm compresses  --call for increase in size, discharge, or increase in temperature    3. RTC 1 year

## 2017-12-13 NOTE — LETTER
December 16, 2017      Morris Campo MD  [address]  [facility] [address]           [facility] [address] [place] [address]  Phone: [phone]          Patient: Sarina Ann   MR Number: [id number]   YOB: 1990   Date of Visit: 12/13/2017       Dear Dr. Morris Campo:    Thank you for referring Sarina Ann to me for evaluation. Attached you will find relevant portions of my assessment and plan of care.    If you have questions, please do not hesitate to call me. I look forward to following Sarina Ann along with you.    Sincerely,    Judy Adair, NP    Enclosure  CC:  No Recipients    If you would like to receive this communication electronically, please contact [email or web address] or [phone] to request more information on [email or web address] Link access.    For providers and/or their staff who would like to refer a patient to [facility], please contact us through our one-stop-shop provider referral line, [facility] , at [phone].    If you feel you have received this communication in error or would no longer like to receive these types of communications, please e-mail [email or web address]

## 2017-12-13 NOTE — PROGRESS NOTES
"2017    SUBJECTIVE:   27 y.o. female for annual exam.    History reviewed. No pertinent past medical history.    Past Surgical History:   Procedure Laterality Date    NO PAST SURGERIES         Current Outpatient Prescriptions   Medication Sig Dispense Refill    diphenhydrAMINE (BENADRYL) 12.5 mg/5 mL elixir Take by mouth every 8 (eight) hours.      fluticasone (FLONASE) 50 mcg/actuation nasal spray 1 spray by Each Nare route 2 (two) times daily as needed. 15 g 0    loratadine (CLARITIN) 10 mg tablet Take 10 mg by mouth once daily.       No current facility-administered medications for this visit.      Allergies: Ceftriaxone   Patient's last menstrual period was 2017.     Using condoms to prevent pregnancy.    OB History      Para Term  AB Living    0 0 0 0 0 0    SAB TAB Ectopic Multiple Live Births    0 0 0 0              Well Woman:  Pap:2015 normal  Mammo:n/a  Colonoscopy:n/a  Dexa:n/a    Family History  Family History   Problem Relation Age of Onset    Colon cancer Father     Cancer Father 46     colon ca    Diabetes Mother     Hypertension Mother     Breast cancer Neg Hx     Eclampsia Neg Hx     Miscarriages / Stillbirths Neg Hx     Ovarian cancer Neg Hx      labor Neg Hx     Stroke Neg Hx       Colon CA: Yes - father-- age 48  Breast CA: No  GYN CA: No   CA: No      ROS:  Feeling well.   No dyspnea or chest pain on exertion.    No abdominal pain, change in bowel habits, black or bloody stools.    No urinary tract symptoms.   GYN ROS: normal menses, no abnormal bleeding, pelvic pain or discharge, no breast pain or new or enlarging lumps on self exam.   No neurological complaints.    OBJECTIVE:   The patient appears well, alert, oriented x 3, in no distress.  /72 (BP Location: Right arm, Patient Position: Sitting)   Ht 5' 7" (1.702 m)   Wt 100.6 kg (221 lb 12.5 oz)   LMP 2017   BMI 34.74 kg/m²   ENT normal.  Neck supple. No adenopathy or " thyromegaly. SNEHAL.   Lungs are clear, good air entry, no wheezes, rhonchi or rales.   S1 and S2 normal, no murmurs, regular rate and rhythm.   Abdomen soft without tenderness, guarding, mass or organomegaly.   Extremities show no edema, normal peripheral pulses.   Neurological is normal, no focal findings.    BREAST EXAM: breasts appear normal, no suspicious masses, no skin or nipple changes or axillary nodes    PELVIC EXAM:   VULVA: normal appearing vulva with no masses, tenderness or lesions, caruncle noted on R labia near introitus  VAGINA: normal appearing vagina with normal color and discharge, no lesions,  CERVIX: normal appearing cervix without discharge or lesions,   UTERUS: uterus is normal size, shape, consistency and nontender,   ADNEXA: no masses,     ASSESSMENT:   1. Encounter for screening for cervical cancer   Liquid-based pap smear, screening   2. Well woman exam     3. Caruncle of labium         PLAN:   1. Well woman  --pap today  --takes 2-3 weeks for results.    2. Caruncle  --warm compresses  --call for increase in size, discharge, or increase in temperature    3. RTC 1 year        30 minutes were spent in face to face time with this patient  75 % of this time was spent in counseling and/or coordination of care  Judy MAIN [facility]  Division of Female Pelvic Medicine and Reconstructive Surgery  Department of Obstetrics & Gynecology

## 2017-12-19 ENCOUNTER — OFFICE VISIT (OUTPATIENT)
Dept: ALLERGY | Facility: CLINIC | Age: 27
End: 2017-12-19
Payer: MEDICAID

## 2017-12-19 VITALS
DIASTOLIC BLOOD PRESSURE: 80 MMHG | HEIGHT: 67 IN | SYSTOLIC BLOOD PRESSURE: 100 MMHG | WEIGHT: 225.31 LBS | BODY MASS INDEX: 35.36 KG/M2 | HEART RATE: 72 BPM

## 2017-12-19 DIAGNOSIS — H10.13 ALLERGIC CONJUNCTIVITIS, BILATERAL: ICD-10-CM

## 2017-12-19 DIAGNOSIS — J30.89 CHRONIC NON-SEASONAL ALLERGIC RHINITIS, UNSPECIFIED TRIGGER: Primary | ICD-10-CM

## 2017-12-19 DIAGNOSIS — R76.0 ABNORMAL ANTIBODY TITER: ICD-10-CM

## 2017-12-19 PROCEDURE — 99214 OFFICE O/P EST MOD 30 MIN: CPT | Mod: S$PBB,,, | Performed by: ALLERGY & IMMUNOLOGY

## 2017-12-19 PROCEDURE — 99999 PR PBB SHADOW E&M-EST. PATIENT-LVL III: CPT | Mod: PBBFAC,,, | Performed by: ALLERGY & IMMUNOLOGY

## 2017-12-19 PROCEDURE — 90732 PPSV23 VACC 2 YRS+ SUBQ/IM: CPT | Mod: PBBFAC

## 2017-12-19 PROCEDURE — 99213 OFFICE O/P EST LOW 20 MIN: CPT | Mod: PBBFAC | Performed by: ALLERGY & IMMUNOLOGY

## 2017-12-19 NOTE — PROGRESS NOTES
Sarina Ann returns to clinic today for continued evaluation of a possible allergic reaction and chronic rhinitis. She is here alone. She was last seen November 28, 2017.    Since her last visit, she has not had any further reactions. Her records from [facility] and [facility] have not yet been received.    She does continue to have mild recurrent rhinitis with pruritus of the nose, eyes, ears, and throat, ear fullness, and clear rhinorrhea.    Her symptoms are worse around cats and dogs.    She denies any cough, wheezing, or shortness of breath. She denies any history of asthma.    She says she gets frequent strep throats. She may have had this several times this year.    [facility] PEQ ALLERGY QUESTIONNAIRE SHORT 12/19/2017   Are you taking any new medications since your last visit? No   Constitution: No symptoms   Head or facial pain: No symptoms   Eyes: No symptoms   Ears: Fullness   Nose: Post nasal drip   Throat: Frequent clearing   Sinuses: No symptoms   Lungs: No symptoms   Skin: No symptoms   Cardiovascular: No symptoms   Gastrointestinal: No symptoms   Genital/ urinary Frequency of urination   Musculoskeletal: No symptoms   Neurologic: No symptoms   Endocrine: No symptoms   Hematologic: No symptoms   He  Physical Examination:  General: Well-developed, well-nourished, no acute distress.  Head: No sinus tenderness.  Eyes: Conjunctivae:  No bulbar or palpebral conjunctival injection.  Ears: EAC's clear.  TM's clear.  No pre-auricular nodes.  Nose: Nasal Mucosa:  Pink.  Septum: No apparent deviation.  Turbinates:  No significant edema.  Polyps/Mass:  None visible.  Teeth/Gums:  No bleeding noted.  Oropharynx: No exudates.  Neck: Supple without thyromegaly. No cervical lymphadenopathy.    Respiratory/Chest: Effort: Good.  Auscultation:  Clear bilaterally.  Skin: Good turgor.  No urticaria or angioedema.  Neuro/Psych: Oriented x 3.    Laboratory 11/28/2017:  IgE level: 358.  ImmunoCAP:  Class IV: Dust mites,  "Blossom.  Class III: Anup, dog.  Class II: Manning elder, cat.  Class I: Bermuda, English plantain, ragweed.  IgA: 299.  Ig.  IgM: 4031.  Pneumococcal titers: Not protective.  CBC: Normal.  CMP: Normal.    Assessment:  1. Allergic rhinitis.  2. Allergic conjunctivitis.  3. History of angioedema, probably secondary to Rocephin.  4. History of "paralysis", consider conversion reaction or adverse reaction to Rocephin.  5. Recurrent upper respiratory infections and history of recurrent strep pharyngitis with low pneumococcal titers.  6. Recent elevated liver enzymes of uncertain etiology, resolved.    Recommendations:  1. House dust mite avoidance.  2. OTC antihistamines as needed.  3. Start fluticasone 2 sprays each nostril daily in early March.  4. Pneumovax today.  5. Repeat titers in 6 weeks.  6. Return to clinic in 2 months.    Allergic mechanisms and treatment options were reviewed in detail. House dust mite avoidance was reviewed. Handouts were given.    "

## 2017-12-26 ENCOUNTER — PATIENT MESSAGE (OUTPATIENT)
Dept: UROGYNECOLOGY | Facility: CLINIC | Age: 27
End: 2017-12-26

## 2018-01-03 ENCOUNTER — HOSPITAL ENCOUNTER (EMERGENCY)
Facility: OTHER | Age: 28
Discharge: HOME | End: 2018-01-03
Attending: EMERGENCY MEDICINE
Payer: MEDICAID

## 2018-01-03 ENCOUNTER — OFFICE VISIT (OUTPATIENT)
Dept: UROGYNECOLOGY | Facility: CLINIC | Age: 28
End: 2018-01-03
Attending: PEDIATRICS
Payer: MEDICAID

## 2018-01-03 VITALS
TEMPERATURE: 98 F | DIASTOLIC BLOOD PRESSURE: 70 MMHG | RESPIRATION RATE: 18 BRPM | HEIGHT: 67 IN | OXYGEN SATURATION: 99 % | BODY MASS INDEX: 34.69 KG/M2 | HEART RATE: 88 BPM | SYSTOLIC BLOOD PRESSURE: 123 MMHG | WEIGHT: 221 LBS

## 2018-01-03 VITALS
HEIGHT: 67 IN | BODY MASS INDEX: 34.81 KG/M2 | DIASTOLIC BLOOD PRESSURE: 74 MMHG | WEIGHT: 221.81 LBS | SYSTOLIC BLOOD PRESSURE: 108 MMHG

## 2018-01-03 DIAGNOSIS — N89.8 VAGINAL DISCHARGE: Primary | ICD-10-CM

## 2018-01-03 DIAGNOSIS — Z20.2 POSSIBLE EXPOSURE TO STD: Primary | ICD-10-CM

## 2018-01-03 DIAGNOSIS — Z11.3 SCREENING EXAMINATION FOR STD (SEXUALLY TRANSMITTED DISEASE): ICD-10-CM

## 2018-01-03 LAB
B-HCG UR QL: NEGATIVE
BACTERIA #/AREA URNS HPF: ABNORMAL /HPF
BILIRUB UR QL STRIP: NEGATIVE
CLARITY UR: ABNORMAL
COLOR UR: YELLOW
CTP QC/QA: YES
GLUCOSE UR QL STRIP: NEGATIVE
HGB UR QL STRIP: NEGATIVE
KETONES UR QL STRIP: NEGATIVE
LEUKOCYTE ESTERASE UR QL STRIP: ABNORMAL
MICROSCOPIC COMMENT: ABNORMAL
NITRITE UR QL STRIP: NEGATIVE
PH UR STRIP: 6 [PH] (ref 5–8)
PROT UR QL STRIP: ABNORMAL
RBC #/AREA URNS HPF: 1 /HPF (ref 0–4)
SP GR UR STRIP: 1.02 (ref 1–1.03)
SQUAMOUS #/AREA URNS HPF: 10 /HPF
URN SPEC COLLECT METH UR: ABNORMAL
UROBILINOGEN UR STRIP-ACNC: NEGATIVE EU/DL
WBC #/AREA URNS HPF: 4 /HPF (ref 0–5)

## 2018-01-03 PROCEDURE — 99283 EMERGENCY DEPT VISIT LOW MDM: CPT | Mod: 25,27

## 2018-01-03 PROCEDURE — 99213 OFFICE O/P EST LOW 20 MIN: CPT | Mod: S$PBB,,, | Performed by: NURSE PRACTITIONER

## 2018-01-03 PROCEDURE — 99999 PR PBB SHADOW E&M-EST. PATIENT-LVL III: CPT | Mod: PBBFAC,,, | Performed by: NURSE PRACTITIONER

## 2018-01-03 PROCEDURE — 81000 URINALYSIS NONAUTO W/SCOPE: CPT

## 2018-01-03 PROCEDURE — 81025 URINE PREGNANCY TEST: CPT | Performed by: EMERGENCY MEDICINE

## 2018-01-03 PROCEDURE — 99213 OFFICE O/P EST LOW 20 MIN: CPT | Mod: PBBFAC | Performed by: NURSE PRACTITIONER

## 2018-01-03 PROCEDURE — 87491 CHLMYD TRACH DNA AMP PROBE: CPT

## 2018-01-03 PROCEDURE — 87480 CANDIDA DNA DIR PROBE: CPT

## 2018-01-03 NOTE — ED NOTES
Two patient identifiers have been checked and are correct.      Appearance: Pt awake, alert & oriented to person, place & time. Pt in no acute distress at present time. Pt is clean and well groomed with clothes appropriately fastened.   Skin: Skin warm, dry & intact. Color consistent with ethnicity. Mucous membranes moist. No breakdown or brusing noted.   Musculoskeletal: Patient moving all extremities well, no obvious swelling or deformities noted.   Respiratory: Respirations spontaneous, even, and non-labored. Visible chest rise noted. Airway is open and patent. No accessory muscle use noted.   Neurologic: Sensation is intact. Speech is clear and appropriate. Eyes open spontaneously, behavior appropriate to situation, follows commands, facial expression symmetrical, bilateral hand grasp equal and even, purposeful motor response noted.  Cardiac: All peripheral pulses present. No Bilateral lower extremity edema. Cap refill is <3 seconds.  Abdomen: Abdomen soft, non-tender to palpation.   : Pt reports no dysuria or hematuria.

## 2018-01-03 NOTE — ED TRIAGE NOTES
"Pt presents to ER " I had unprotected sex on the 23 of this month and I just want to get checked". Denies vaginal discharge, bleeding or pain no dysuria or hematuria.   "

## 2018-01-03 NOTE — PATIENT INSTRUCTIONS
1. Vaginal discharge  --affirm and genprobe today    2. Std screening  --hiv, rpr, hepatitis panel today    3. RTC 12/2018 for annual

## 2018-01-03 NOTE — ED PROVIDER NOTES
Encounter Date: 1/3/2018       History     Chief Complaint   Patient presents with    Exposure to STD     pt reports possible exposure to STD and wants to be checked. denies any symptoms.     Patient is 27 year old female who presents with concerns that she may have been exposed to an STD and reports that she just wants to be tested for all STD's to be sure. She denies dysuria, vaginal discharge, irregular bleeding, fever, chills, nausea, vomiting, abnormal bowel movements. She has not attempted to contact her GYN here at [facility] and reports that she was seen last month with a normal pap smear but admits she didn't; have STD testing at that time. She didn't think to contact her GYN for this issue. She is currently unaccompanied in the ER.           Review of patient's allergies indicates:   Allergen Reactions    Ceftriaxone Edema     History reviewed. No pertinent past medical history.  Past Surgical History:   Procedure Laterality Date    NO PAST SURGERIES       Family History   Problem Relation Age of Onset    Colon cancer Father     Cancer Father 46     colon ca    Diabetes Mother     Hypertension Mother     Breast cancer Neg Hx     Eclampsia Neg Hx     Miscarriages / Stillbirths Neg Hx     Ovarian cancer Neg Hx      labor Neg Hx     Stroke Neg Hx      Social History   Substance Use Topics    Smoking status: Never Smoker    Smokeless tobacco: Never Used    Alcohol use Yes      Comment: social     Review of Systems   Constitutional: Negative for fever.   HENT: Negative for sore throat.    Respiratory: Negative for shortness of breath.    Cardiovascular: Negative for chest pain.   Gastrointestinal: Negative for nausea and vomiting.   Genitourinary: Negative for dysuria.   Musculoskeletal: Negative for back pain.   Skin: Negative for rash.   Neurological: Negative for weakness.   Hematological: Does not bruise/bleed easily.       Physical Exam     Initial Vitals [18 0950]   BP Pulse Resp  Temp SpO2   123/70 88 18 97.6 °F (36.4 °C) 99 %      MAP       87.67         Physical Exam    Nursing note and vitals reviewed.  Constitutional: She appears well-developed and well-nourished. No distress.   Healthy appearing female in NAD or apparent pain. She makes good eye contact, speaks in clear full sentences and ambulates with ease.    HENT:   Head: Atraumatic.   Eyes: Conjunctivae are normal. Right eye exhibits no discharge. Left eye exhibits no discharge. No scleral icterus.   Neck: Normal range of motion.   Cardiovascular: Normal rate.   Pulmonary/Chest: No respiratory distress.   Abdominal: Soft. There is no tenderness.   Neurological: She is alert and oriented to person, place, and time. She has normal strength.   Skin: Skin is warm and dry.   Psychiatric: She has a normal mood and affect. Her behavior is normal. Judgment and thought content normal.         ED Course   Procedures  Labs Reviewed   URINALYSIS - Abnormal; Notable for the following:        Result Value    Appearance, UA Hazy (*)     Protein, UA Trace (*)     Leukocytes, UA Trace (*)     All other components within normal limits   URINALYSIS MICROSCOPIC - Abnormal; Notable for the following:     Bacteria, UA Few (*)     All other components within normal limits   POCT URINE PREGNANCY             Medical Decision Making:   ED Management:  Urgent evaluation of 27-year-old female who presents to the emergency department for STD testing.  She is afebrile, nontoxic appearing, hemodynamically stable and currently asymptomatic.  I explained to her this is not the most appropriate place for compresses of STD testing.  I contacted her GYN, Giovany NP, here at [facility] and was able to secure an appointment for the patient in clinic today at 12:30.  Patient educated on follow-up plan and verbalizes understanding.  Case discussed with attending who agrees with plan.  Other:   I have discussed this case with another health care provider.       <> Summary  of the Discussion: Jen                   ED Course      Clinical Impression:   The encounter diagnosis was Possible exposure to STD.                           Elisa Oliveira PA-C  01/03/18 1123

## 2018-01-03 NOTE — PROGRESS NOTES
"2018    SUBJECTIVE:   27 y.o. female for STD testing.    History reviewed. No pertinent past medical history.    Past Surgical History:   Procedure Laterality Date    NO PAST SURGERIES         Current Outpatient Prescriptions   Medication Sig Dispense Refill    diphenhydrAMINE (BENADRYL) 12.5 mg/5 mL elixir Take by mouth every 8 (eight) hours.      fluticasone (FLONASE) 50 mcg/actuation nasal spray 1 spray by Each Nare route 2 (two) times daily as needed. 15 g 0    loratadine (CLARITIN) 10 mg tablet Take 10 mg by mouth once daily.      metroNIDAZOLE (FLAGYL) 500 MG tablet Take 1 tablet (500 mg total) by mouth every 12 (twelve) hours. 14 tablet 0     No current facility-administered medications for this visit.      Allergies: Ceftriaxone   Patient's last menstrual period was 2017.    Well Woman:  Pap:2017 normal  Mammo:n/a  Colonoscopy:n/a  Dexa:n/a    Family History  Family History   Problem Relation Age of Onset    Colon cancer Father     Cancer Father 46     colon ca    Diabetes Mother     Hypertension Mother     Breast cancer Neg Hx     Eclampsia Neg Hx     Miscarriages / Stillbirths Neg Hx     Ovarian cancer Neg Hx      labor Neg Hx     Stroke Neg Hx           ROS:  Feeling well.   No dyspnea or chest pain on exertion.    No abdominal pain, change in bowel habits, black or bloody stools.    No urinary tract symptoms.   GYN ROS: she complains of possible std exposure.   No neurological complaints.    OBJECTIVE:   The patient appears well, alert, oriented x 3, in no distress.  /74 (BP Location: Right arm, Patient Position: Sitting)   Ht 5' 7" (1.702 m)   Wt 100.6 kg (221 lb 12.5 oz)   LMP 2017   BMI 34.74 kg/m²   Abdomen soft without tenderness, guarding, mass or organomegaly.   Extremities show no edema, normal peripheral pulses.   Neurological is normal, no focal findings.      PELVIC EXAM:   VULVA: normal appearing vulva with no masses, tenderness or lesions, "   VAGINA: normal appearing vagina with normal color and discharge, no lesions, vaginal discharge - white and malodorous,  CERVIX: normal appearing cervix without discharge or lesions,   UTERUS: uterus is normal size, shape, consistency and nontender,   ADNEXA: normal adnexa in size, nontender and no masses,       ASSESSMENT:   1. Vaginal discharge  C. trachomatis/N. gonorrhoeae by AMP DNA Cervicovaginal    Vaginosis Screen by DNA Probe   2. Screening examination for STD (sexually transmitted disease)  HIV-1 and HIV-2 antibodies    RPR    Hepatitis panel, acute       PLAN:     1. Vaginal discharge  --affirm and genprobe today    2. Std screening  --hiv, rpr, hepatitis panel today    3. RTC 12/2018 for annual      30 minutes were spent in face to face time with this patient  75 % of this time was spent in counseling and/or coordination of care  Judy MAIN [facility]  Division of Female Pelvic Medicine and Reconstructive Surgery  Department of Obstetrics & Gynecology

## 2018-01-04 ENCOUNTER — PATIENT MESSAGE (OUTPATIENT)
Dept: UROGYNECOLOGY | Facility: CLINIC | Age: 28
End: 2018-01-04

## 2018-01-04 DIAGNOSIS — N76.0 BACTERIAL VAGINOSIS: Primary | ICD-10-CM

## 2018-01-04 DIAGNOSIS — B96.89 BACTERIAL VAGINOSIS: Primary | ICD-10-CM

## 2018-01-04 LAB
C TRACH DNA SPEC QL NAA+PROBE: NOT DETECTED
CANDIDA RRNA VAG QL PROBE: NEGATIVE
G VAGINALIS RRNA GENITAL QL PROBE: POSITIVE
N GONORRHOEA DNA SPEC QL NAA+PROBE: NOT DETECTED
T VAGINALIS RRNA GENITAL QL PROBE: NEGATIVE

## 2018-01-04 RX ORDER — METRONIDAZOLE 500 MG/1
500 TABLET ORAL EVERY 12 HOURS
Qty: 14 TABLET | Refills: 0 | Status: SHIPPED | OUTPATIENT
Start: 2018-01-04 | End: 2018-01-11

## 2018-01-05 ENCOUNTER — PATIENT MESSAGE (OUTPATIENT)
Dept: UROGYNECOLOGY | Facility: CLINIC | Age: 28
End: 2018-01-05

## 2018-01-30 ENCOUNTER — LAB VISIT (OUTPATIENT)
Dept: LAB | Facility: HOSPITAL | Age: 28
End: 2018-01-30
Attending: ALLERGY & IMMUNOLOGY
Payer: MEDICAID

## 2018-01-30 DIAGNOSIS — J30.89 CHRONIC NON-SEASONAL ALLERGIC RHINITIS, UNSPECIFIED TRIGGER: ICD-10-CM

## 2018-01-30 PROCEDURE — 36415 COLL VENOUS BLD VENIPUNCTURE: CPT

## 2018-01-30 PROCEDURE — 86317 IMMUNOASSAY INFECTIOUS AGENT: CPT | Mod: 59

## 2018-02-05 LAB
DEPRECATED S PNEUM 1 IGG SER-MCNC: 113.4 MCG/ML
DEPRECATED S PNEUM12 IGG SER-MCNC: >36 MCG/ML
DEPRECATED S PNEUM14 IGG SER-MCNC: 1.1 MCG/ML
DEPRECATED S PNEUM19 IGG SER-MCNC: >150 MCG/ML
DEPRECATED S PNEUM23 IGG SER-MCNC: 0.5 MCG/ML
DEPRECATED S PNEUM3 IGG SER-MCNC: 2.2 MCG/ML
DEPRECATED S PNEUM4 IGG SER-MCNC: 0.7 MCG/ML
DEPRECATED S PNEUM5 IGG SER-MCNC: 18.4 MCG/ML
DEPRECATED S PNEUM8 IGG SER-MCNC: 4.1 MCG/ML
DEPRECATED S PNEUM9 IGG SER-MCNC: 15.4 MCG/ML
S PNEUM DA 18C IGG SER-MCNC: 2.5 MCG/ML
S PNEUM DA 6B IGG SER-MCNC: <0.3 MCG/ML
S PNEUM DA 7F IGG SER-MCNC: 2.7 MCG/ML
S PNEUM DA 9V IGG SER-MCNC: 0.5 MCG/ML

## 2018-02-08 ENCOUNTER — TELEPHONE (OUTPATIENT)
Dept: ALLERGY | Facility: CLINIC | Age: 28
End: 2018-02-08

## 2018-02-08 NOTE — TELEPHONE ENCOUNTER
Spoke with patient in reference to lab results; also scheduled an appointment with Dr. Wagner in March.

## 2018-02-27 ENCOUNTER — TELEPHONE (OUTPATIENT)
Dept: UROGYNECOLOGY | Facility: CLINIC | Age: 28
End: 2018-02-27

## 2018-02-27 NOTE — TELEPHONE ENCOUNTER
----- Message from Ashlee Altamirano sent at 2/27/2018  9:30 AM CST -----  _ x 1st Request  _  2nd Request  _  3rd Request        Who: vero    Why: pt. Would like to speak with nurse regarding vaginal bump. Please call asap.     What Number to Call Back:[phone]    When to Expect a call back: (Before the end of the day)   -- if the call is after 12:00, the call back will be tomorrow.

## 2018-02-28 ENCOUNTER — OFFICE VISIT (OUTPATIENT)
Dept: UROGYNECOLOGY | Facility: CLINIC | Age: 28
End: 2018-02-28
Payer: MEDICAID

## 2018-02-28 VITALS
SYSTOLIC BLOOD PRESSURE: 110 MMHG | HEIGHT: 67 IN | DIASTOLIC BLOOD PRESSURE: 72 MMHG | BODY MASS INDEX: 35.19 KG/M2 | WEIGHT: 224.19 LBS

## 2018-02-28 DIAGNOSIS — N75.1 BARTHOLIN'S GLAND ABSCESS: Primary | ICD-10-CM

## 2018-02-28 PROCEDURE — 99999 PR PBB SHADOW E&M-EST. PATIENT-LVL III: CPT | Mod: PBBFAC,,, | Performed by: NURSE PRACTITIONER

## 2018-02-28 PROCEDURE — 99213 OFFICE O/P EST LOW 20 MIN: CPT | Mod: S$PBB,,, | Performed by: NURSE PRACTITIONER

## 2018-02-28 PROCEDURE — 99213 OFFICE O/P EST LOW 20 MIN: CPT | Mod: PBBFAC | Performed by: NURSE PRACTITIONER

## 2018-02-28 RX ORDER — SULFAMETHOXAZOLE AND TRIMETHOPRIM 800; 160 MG/1; MG/1
1 TABLET ORAL 2 TIMES DAILY
Qty: 14 TABLET | Refills: 0 | Status: SHIPPED | OUTPATIENT
Start: 2018-02-28 | End: 2018-03-12

## 2018-02-28 NOTE — PATIENT INSTRUCTIONS
1. Bartholin gland abscess  --warm sitz baths  --bactrim DS 1 tablet twice dialy x 7 days  --Call for drainage, fever > 101, or increased pain  --ibuprofen 600 mg every 6 hours    2. RTC 12/2018 for annual

## 2018-02-28 NOTE — PROGRESS NOTES
"2018    SUBJECTIVE:   27 y.o. female for complaints of "painful bump" on L labia.    History reviewed. No pertinent past medical history.    Past Surgical History:   Procedure Laterality Date    NO PAST SURGERIES         Current Outpatient Prescriptions   Medication Sig Dispense Refill    diphenhydrAMINE (BENADRYL) 12.5 mg/5 mL elixir Take by mouth every 8 (eight) hours.      fluticasone (FLONASE) 50 mcg/actuation nasal spray 1 spray by Each Nare route 2 (two) times daily as needed. 15 g 0    loratadine (CLARITIN) 10 mg tablet Take 10 mg by mouth once daily.      sulfamethoxazole-trimethoprim 800-160mg (BACTRIM DS) 800-160 mg Tab Take 1 tablet by mouth 2 (two) times daily. 14 tablet 0     No current facility-administered medications for this visit.      Allergies: Ceftriaxone   Patient's last menstrual period was 2018.    Well Woman:  Pap:2017 normal  Mammo:n/a  Colonoscopy:n/a  Dexa:n/a    Family History  Family History   Problem Relation Age of Onset    Colon cancer Father     Cancer Father 46     colon ca    Diabetes Mother     Hypertension Mother     Breast cancer Neg Hx     Eclampsia Neg Hx     Miscarriages / Stillbirths Neg Hx     Ovarian cancer Neg Hx      labor Neg Hx     Stroke Neg Hx           ROS:  Feeling well.   No abdominal pain, change in bowel habits, black or bloody stools.    No urinary tract symptoms.   GYN ROS: she complains of possible std exposure.   No neurological complaints.    OBJECTIVE:   The patient appears well, alert, oriented x 3, in no distress.  /72 (BP Location: Right arm, Patient Position: Sitting)   Ht 5' 7" (1.702 m)   Wt 101.7 kg (224 lb 3.3 oz)   LMP 2018   BMI 35.12 kg/m²   Abdomen soft without tenderness, guarding, mass or organomegaly.   Extremities show no edema, normal peripheral pulses.   Neurological is normal, no focal findings.      PELVIC EXAM:   VULVA: enlarged bartholin's gland-- mild fluctuance and redness. No " drainage noted.  No lymph nodes swollen  Rest of pelvic deferred due to inability to tolerate.    ASSESSMENT:   1. Bartholin's gland abscess  sulfamethoxazole-trimethoprim 800-160mg (BACTRIM DS) 800-160 mg Tab       PLAN:     1. Bartholin gland abscess  --warm sitz baths  --bactrim DS 1 tablet twice dialy x 7 days  --Call for drainage, fever > 101, or increased pain  --ibuprofen 600 mg every 6 hours    2. RTC 12/2018 for annual      30 minutes were spent in face to face time with this patient  75 % of this time was spent in counseling and/or coordination of care  Judy MAIN [facility]  Division of Female Pelvic Medicine and Reconstructive Surgery  Department of Obstetrics & Gynecology

## 2018-03-09 ENCOUNTER — TELEPHONE (OUTPATIENT)
Dept: UROGYNECOLOGY | Facility: CLINIC | Age: 28
End: 2018-03-09

## 2018-03-09 NOTE — TELEPHONE ENCOUNTER
Left voice message for pt to give the office a call back at [phone] to reschedule missed appointment 03/09.

## 2018-03-12 ENCOUNTER — OFFICE VISIT (OUTPATIENT)
Dept: ALLERGY | Facility: CLINIC | Age: 28
End: 2018-03-12
Payer: MEDICAID

## 2018-03-12 VITALS
BODY MASS INDEX: 35.33 KG/M2 | HEIGHT: 67 IN | DIASTOLIC BLOOD PRESSURE: 80 MMHG | SYSTOLIC BLOOD PRESSURE: 106 MMHG | OXYGEN SATURATION: 98 % | WEIGHT: 225.06 LBS | HEART RATE: 71 BPM

## 2018-03-12 DIAGNOSIS — R51.9 NONINTRACTABLE EPISODIC HEADACHE, UNSPECIFIED HEADACHE TYPE: ICD-10-CM

## 2018-03-12 DIAGNOSIS — J30.89 CHRONIC NON-SEASONAL ALLERGIC RHINITIS, UNSPECIFIED TRIGGER: Primary | ICD-10-CM

## 2018-03-12 DIAGNOSIS — H10.13 ALLERGIC CONJUNCTIVITIS, BILATERAL: ICD-10-CM

## 2018-03-12 DIAGNOSIS — R76.0 ABNORMAL ANTIBODY TITER: ICD-10-CM

## 2018-03-12 PROCEDURE — 99213 OFFICE O/P EST LOW 20 MIN: CPT | Mod: PBBFAC | Performed by: ALLERGY & IMMUNOLOGY

## 2018-03-12 PROCEDURE — 99999 PR PBB SHADOW E&M-EST. PATIENT-LVL III: CPT | Mod: PBBFAC,,, | Performed by: ALLERGY & IMMUNOLOGY

## 2018-03-12 PROCEDURE — 99214 OFFICE O/P EST MOD 30 MIN: CPT | Mod: S$PBB,,, | Performed by: ALLERGY & IMMUNOLOGY

## 2018-03-12 RX ORDER — AZELASTINE 1 MG/ML
1-2 SPRAY, METERED NASAL 2 TIMES DAILY PRN
Qty: 30 ML | Refills: 5 | Status: SHIPPED | OUTPATIENT
Start: 2018-03-12

## 2018-03-12 RX ORDER — CETIRIZINE HYDROCHLORIDE 10 MG/1
10 TABLET ORAL DAILY
COMMUNITY

## 2018-03-12 NOTE — PROGRESS NOTES
"Sarina Ann returns to clinic today for continued evaluation of a previous allergic reaction and allergic rhinitis. She is here alone. She was last seen December 19, 2017.    She has been to [facility] twice to ask that her medical records be sent here. She has been told that they were. They have not yet been received.    She has not had any further episodes of paralysis.    She was taking Flonase every day. When she was doing so her rhinitis was much better controlled. She stopped taking this over a week ago and since then she has had increased pruritus of the eyes and nose, sneezing, clear rhinorrhea, and nasal congestion.    She has been taking Claritin or Zyrtec daily. She was only needing this occasionally when she was on Flonase.    This morning she woke up with a headache over the right side of her eye. It was "severe". She took Claritin and Benadryl and it did improve. It is now almost resolved.    She has had headaches in the past that were associated with nausea. She does not have any photophobia or visual disturbances. She was given migraine headache medication that she did not find effective.    She has not had any more "sinus infections". She is not having sore throats.    She denies any cough, wheezing, or shortness of breath.    Her pneumococcal titers did improve after her Pneumovax.    [facility] PEQ ALLERGY QUESTIONNAIRE SHORT 3/12/2018   Are you taking any new medications since your last visit? No   Constitution: No symptoms   Head or facial pain: Headaches, Sinus pressure   Eyes: Itching, Tearing, Redness   Ears: Pressure   Nose: Post nasal drip, Sneezing, Runny nose   Throat: Itching, Frequent clearing   Sinuses: Sinus infections   Lungs: No symptoms   Skin: No symptoms   Cardiovascular: No symptoms   Gastrointestinal: No symptoms   Genital/ urinary No symptoms   Musculoskeletal: No symptoms   Neurologic: Headaches   Endocrine: Headaches   Hematologic: No symptoms     Physical " "Examination:  General: Well-developed, well-nourished, no acute distress.  Head: No sinus tenderness.  Eyes: Conjunctivae:  No bulbar or palpebral conjunctival injection.  Ears: EAC's clear.  TM's clear.  No pre-auricular nodes.  Nose: Nasal Mucosa:  Pink.  Septum: No apparent deviation.  Turbinates:  No significant edema.  Polyps/Mass:  None visible.  Teeth/Gums:  No bleeding noted.  Oropharynx: No exudates.  Neck: Supple without thyromegaly. No cervical lymphadenopathy.    Respiratory/Chest: Effort: Good.  Auscultation:  Clear bilaterally.  Skin: Good turgor.  No urticaria or angioedema.  Neuro/Psych: Oriented x 3.    Laboratory 2017:  IgE level: 358.  ImmunoCAP:  Class IV: Dust mites, Oak.  Class III: Anup, dog.  Class II: Manning elder, cat.  Class I: Bermuda, English plantain, ragweed.  IgA: 299.  Ig.  IgM: 4031.  Pneumococcal titers: Not protective.  CBC: Normal.  CMP: Normal.    Laboratory 2018:  Pneumococcal titers: Protective.    Assessment:  1. Allergic rhinitis, not controlled.  2. Allergic conjunctivitis, not controlled.  3. History of angioedema, probably secondary to Rocephin, resolved.  4. History of "paralysis", consider conversion reaction or adverse reaction to Rocephin, resolved.  5. Recurrent upper respiratory infections and history of recurrent strep pharyngitis with low pneumococcal titers, improved.  6. Recent elevated liver enzymes of uncertain etiology, resolved.   7. Headache of uncertain etiology.    Recommendations:  1. Continue house dust mite avoidance.  2. Restart fluticasone daily.  3. OTC antihistamines as needed.  4. Azelastine 1-2 sprays each nostril twice a day when necessary rhinitis.  5. If headaches continue, consider sinus CT when symptomatic.  6. She is to return the [facility] to see records will be given to her to bring to me.      "

## 2018-04-27 ENCOUNTER — TELEPHONE (OUTPATIENT)
Dept: UROGYNECOLOGY | Facility: CLINIC | Age: 28
End: 2018-04-27

## 2018-04-27 NOTE — TELEPHONE ENCOUNTER
----- Message from Reno Montoya sent at 4/27/2018  8:20 AM CDT -----  Name of Who is Calling:GUY CHAPPELL [[id number]]      What is the request in detail: Pt states she has a perera on her vagina and she would like to know if she can have a refill on the antibiotics she was taking. Attempted to schedule an appt with pt but nothing was available for today. Please call to discuss.      Can the clinic reply by [facility]: yes      What Number to Call Back [facility] not [facility]: [phone]

## 2018-04-27 NOTE — TELEPHONE ENCOUNTER
Spoke with pt and informed her NP Giovany is out the office today. Offered pt an next available apt, pt agreed and is  scheduled to come in on 4/30/18 at 3 pm.

## 2018-04-30 ENCOUNTER — OFFICE VISIT (OUTPATIENT)
Dept: UROGYNECOLOGY | Facility: CLINIC | Age: 28
End: 2018-04-30
Payer: MEDICAID

## 2018-04-30 VITALS
DIASTOLIC BLOOD PRESSURE: 74 MMHG | SYSTOLIC BLOOD PRESSURE: 100 MMHG | HEIGHT: 67 IN | WEIGHT: 231.69 LBS | BODY MASS INDEX: 36.36 KG/M2

## 2018-04-30 DIAGNOSIS — N76.6 GENITAL ULCER, FEMALE: Primary | ICD-10-CM

## 2018-04-30 PROCEDURE — 99213 OFFICE O/P EST LOW 20 MIN: CPT | Mod: PBBFAC | Performed by: NURSE PRACTITIONER

## 2018-04-30 PROCEDURE — 99213 OFFICE O/P EST LOW 20 MIN: CPT | Mod: S$PBB,,, | Performed by: NURSE PRACTITIONER

## 2018-04-30 PROCEDURE — 99999 PR PBB SHADOW E&M-EST. PATIENT-LVL III: CPT | Mod: PBBFAC,,, | Performed by: NURSE PRACTITIONER

## 2018-04-30 PROCEDURE — 87529 HSV DNA AMP PROBE: CPT

## 2018-04-30 RX ORDER — VALACYCLOVIR HYDROCHLORIDE 1 G/1
1000 TABLET, FILM COATED ORAL EVERY 12 HOURS
Qty: 20 TABLET | Refills: 0 | Status: SHIPPED | OUTPATIENT
Start: 2018-04-30 | End: 2018-06-27

## 2018-04-30 NOTE — PROGRESS NOTES
"2018    SUBJECTIVE:   27 y.o. female for complaints of "several painful bumps" on L labia.    Past Medical History:   Diagnosis Date    Allergy     Recurrent upper respiratory infection (URI)        Past Surgical History:   Procedure Laterality Date    NO PAST SURGERIES         Current Outpatient Prescriptions   Medication Sig Dispense Refill    azelastine (ASTELIN) 137 mcg (0.1 %) nasal spray 1-2 sprays (137-274 mcg total) by Nasal route 2 (two) times daily as needed for Rhinitis. 30 mL 5    cetirizine (ZYRTEC) 10 MG tablet Take 10 mg by mouth once daily.      diphenhydrAMINE (BENADRYL) 12.5 mg/5 mL elixir Take by mouth every 8 (eight) hours.      fluticasone (FLONASE) 50 mcg/actuation nasal spray 1 spray by Each Nare route 2 (two) times daily as needed. 15 g 0    valACYclovir (VALTREX) 1000 MG tablet Take 1 tablet (1,000 mg total) by mouth every 12 (twelve) hours. 20 tablet 0     No current facility-administered medications for this visit.      Allergies: Ceftriaxone   Patient's last menstrual period was 2018.    Well Woman:  Pap:2017 normal  Mammo:n/a  Colonoscopy:n/a  Dexa:n/a    Family History  Family History   Problem Relation Age of Onset    Colon cancer Father     Cancer Father 46     colon ca    Diabetes Mother     Hypertension Mother     Breast cancer Neg Hx     Eclampsia Neg Hx     Miscarriages / Stillbirths Neg Hx     Ovarian cancer Neg Hx      labor Neg Hx     Stroke Neg Hx           ROS:  Feeling well.   No abdominal pain, change in bowel habits, black or bloody stools.    No urinary tract symptoms.   GYN ROS: she complains of pain on L labia   No neurological complaints.    OBJECTIVE:   The patient appears well, alert, oriented x 3, in no distress.  /74 (BP Location: Right arm, Patient Position: Sitting, BP Method: Large (Manual))   Ht 5' 7" (1.702 m)   Wt 105.1 kg (231 lb 11.3 oz)   LMP 2018   BMI 36.29 kg/m²   Abdomen soft without tenderness, " guarding, mass or organomegaly.   Extremities show no edema, normal peripheral pulses.   Neurological is normal, no focal findings.      PELVIC EXAM:   VULVA: Multilple ulcerations noted on L labia consistent with herpetic lesions. No redness or drainage noted.  No lymph nodes swollen  Rest of pelvic deferred    ASSESSMENT:   1. Genital ulcer, female  valACYclovir (VALTREX) 1000 MG tablet    HSV by Rapid PCR, Non-Blood [facility]; Vagina       PLAN:     1. Genital ulcer  --hsv culture  --valtrex 1 gm twice daily x 10 days  --call if not improved in one week  --call for future outbreaks    2. RTC 12/2018 for annual      30 minutes were spent in face to face time with this patient  75 % of this time was spent in counseling and/or coordination of care  Judy MAIN Giovany  [facility]  Division of Female Pelvic Medicine and Reconstructive Surgery  Department of Obstetrics & Gynecology

## 2018-04-30 NOTE — PATIENT INSTRUCTIONS
1. Genital ulcer  --hsv culture  --valtrex 1 gm twice daily x 10 days  --call if not improved in one week  --call for future outbreaks    2. RTC 12/2018 for annual

## 2018-05-02 LAB
HSV1 DNA SPEC QL NAA+PROBE: NEGATIVE
HSV2 DNA SPEC QL NAA+PROBE: POSITIVE
SPECIMEN SOURCE: ABNORMAL

## 2018-05-21 ENCOUNTER — TELEPHONE (OUTPATIENT)
Dept: UROGYNECOLOGY | Facility: CLINIC | Age: 28
End: 2018-05-21

## 2018-05-21 NOTE — TELEPHONE ENCOUNTER
Reviewed + hsv results with patient.  Instructed her to call with any further outbreaks.  Judy Adair, MARYANNP-BC

## 2018-06-20 ENCOUNTER — OFFICE VISIT (OUTPATIENT)
Dept: OBSTETRICS AND GYNECOLOGY | Facility: CLINIC | Age: 28
End: 2018-06-20
Payer: MEDICAID

## 2018-06-20 VITALS
BODY MASS INDEX: 37.09 KG/M2 | SYSTOLIC BLOOD PRESSURE: 90 MMHG | DIASTOLIC BLOOD PRESSURE: 60 MMHG | HEIGHT: 67 IN | WEIGHT: 236.31 LBS

## 2018-06-20 DIAGNOSIS — N76.0 ACUTE VAGINITIS: Primary | ICD-10-CM

## 2018-06-20 LAB
C TRACH DNA SPEC QL NAA+PROBE: NOT DETECTED
N GONORRHOEA DNA SPEC QL NAA+PROBE: NOT DETECTED

## 2018-06-20 PROCEDURE — 87491 CHLMYD TRACH DNA AMP PROBE: CPT

## 2018-06-20 PROCEDURE — 87480 CANDIDA DNA DIR PROBE: CPT

## 2018-06-20 PROCEDURE — 99213 OFFICE O/P EST LOW 20 MIN: CPT | Mod: PBBFAC | Performed by: NURSE PRACTITIONER

## 2018-06-20 PROCEDURE — 87510 GARDNER VAG DNA DIR PROBE: CPT

## 2018-06-20 PROCEDURE — 99213 OFFICE O/P EST LOW 20 MIN: CPT | Mod: S$PBB,,, | Performed by: NURSE PRACTITIONER

## 2018-06-20 PROCEDURE — 99999 PR PBB SHADOW E&M-EST. PATIENT-LVL III: CPT | Mod: PBBFAC,,, | Performed by: NURSE PRACTITIONER

## 2018-06-20 RX ORDER — METRONIDAZOLE 500 MG/1
500 TABLET ORAL 2 TIMES DAILY
Qty: 14 TABLET | Refills: 0 | Status: SHIPPED | OUTPATIENT
Start: 2018-06-20 | End: 2018-06-27

## 2018-06-20 NOTE — PROGRESS NOTES
CC: Vaginal Odor  Sarina Ann is a 27 y.o. female  presents with complaint of vaginal odor for 1 week.  She reports itching.  Denies vaginal discharge.  Alleviating factors: none. No new sexual partners.  Pt reports she changed soaps prior to onset of symptoms.         ROS:  GENERAL: No fever, chills, fatigability or weight loss.  VULVAR: No pain, no lesions and no itching.  VAGINAL: No relaxation, + itching, + odor. no discharge, no abnormal bleeding and no lesions.  ABDOMEN: No abdominal pain. Denies nausea. Denies vomiting. No diarrhea. No constipation  BREAST: Denies pain. No lumps. No discharge.  URINARY: No incontinence, no nocturia, no frequency and no dysuria.  CARDIOVASCULAR: No chest pain. No shortness of breath. No leg cramps.  NEUROLOGICAL: No headaches. No vision changes.    PHYSICAL EXAM:  VULVA: normal appearing vulva with no masses, tenderness or lesions   VAGINA: normal appearing vagina with normal color and + thin discharge, + odor.  no lesions   CERVIX: normal appearing cervix without discharge or lesions   UTERUS: uterus is normal size, shape, consistency and nontender   ADNEXA: normal adnexa in size, nontender and no masses    ASSESSMENT and PLAN:    ICD-10-CM ICD-9-CM    1. Acute vaginitis N76.0 616.10 C. trachomatis/N. gonorrhoeae by AMP DNA Cervix      Vaginosis Screen by DNA Probe      metroNIDAZOLE (FLAGYL) 500 MG tablet      GCCT  Affirm  Flagyl    Patient was counseled today on vaginitis prevention including :  a. avoiding feminine products such as deoderant soaps, body wash, bubble bath, douches, scented toilet paper, deoderant tampons or pads, feminine wipes, chronic pad use, etc.  b. avoiding other vulvovaginal irritants such as long hot baths, humidity, tight, synthetic clothing, chlorine and sitting around in wet bathing suits  c. wearing cotton underwear, avoiding thong underwear and no underwear to bed  d. taking showers instead of baths and use a hair dryer on cool  setting afterwards to dry  e. wearing cotton to exercise and shower immediately after exercise and change clothes  f. using polyurethane condoms without spermicide if sexually active and symptoms are triggered by intercourse    FOLLOW UP: PRN lack of improvement.

## 2018-06-21 ENCOUNTER — TELEPHONE (OUTPATIENT)
Dept: OBSTETRICS AND GYNECOLOGY | Facility: CLINIC | Age: 28
End: 2018-06-21

## 2018-06-21 DIAGNOSIS — A59.9 TRICHOMONIASIS: Primary | ICD-10-CM

## 2018-06-21 LAB
CANDIDA RRNA VAG QL PROBE: NEGATIVE
G VAGINALIS RRNA GENITAL QL PROBE: POSITIVE
T VAGINALIS RRNA GENITAL QL PROBE: POSITIVE

## 2018-06-21 RX ORDER — METRONIDAZOLE 500 MG/1
500 TABLET ORAL 2 TIMES DAILY
Qty: 14 TABLET | Refills: 0 | Status: SHIPPED | OUTPATIENT
Start: 2018-06-21 | End: 2018-06-27

## 2018-06-21 NOTE — TELEPHONE ENCOUNTER
Notified pt her she is + for trichomoniasis and BV.  This is a STD.  Flagyl sent to pharmacy with refill for her partner.  Avoid alcohol while on the medication and for 24 hours after the last dose.  Notify all sexual partners within the past 60 days and to abstain from intercourse for 7 days after the treatment. Please schedule for CURT on 7/23/18 @ 940 in women's walk in clinic.  Pt verbalized understanding.

## 2018-06-27 ENCOUNTER — OFFICE VISIT (OUTPATIENT)
Dept: INTERNAL MEDICINE | Facility: CLINIC | Age: 28
End: 2018-06-27
Payer: MEDICAID

## 2018-06-27 ENCOUNTER — TELEPHONE (OUTPATIENT)
Dept: INTERNAL MEDICINE | Facility: CLINIC | Age: 28
End: 2018-06-27

## 2018-06-27 VITALS
SYSTOLIC BLOOD PRESSURE: 114 MMHG | HEIGHT: 67 IN | HEART RATE: 100 BPM | WEIGHT: 235.44 LBS | OXYGEN SATURATION: 98 % | BODY MASS INDEX: 36.95 KG/M2 | DIASTOLIC BLOOD PRESSURE: 60 MMHG

## 2018-06-27 DIAGNOSIS — M25.561 CHRONIC PAIN OF BOTH KNEES: Primary | ICD-10-CM

## 2018-06-27 DIAGNOSIS — G89.29 CHRONIC PAIN OF BOTH KNEES: Primary | ICD-10-CM

## 2018-06-27 DIAGNOSIS — M25.562 CHRONIC PAIN OF BOTH KNEES: Primary | ICD-10-CM

## 2018-06-27 DIAGNOSIS — M25.569 KNEE PAIN, UNSPECIFIED CHRONICITY, UNSPECIFIED LATERALITY: Primary | ICD-10-CM

## 2018-06-27 PROCEDURE — 99213 OFFICE O/P EST LOW 20 MIN: CPT | Mod: S$PBB,,, | Performed by: NURSE PRACTITIONER

## 2018-06-27 PROCEDURE — 99214 OFFICE O/P EST MOD 30 MIN: CPT | Mod: PBBFAC | Performed by: NURSE PRACTITIONER

## 2018-06-27 PROCEDURE — 99999 PR PBB SHADOW E&M-EST. PATIENT-LVL IV: CPT | Mod: PBBFAC,,, | Performed by: NURSE PRACTITIONER

## 2018-06-27 RX ORDER — NAPROXEN 375 MG/1
375 TABLET ORAL 2 TIMES DAILY WITH MEALS
Qty: 20 TABLET | Refills: 0 | OUTPATIENT
Start: 2018-06-27 | End: 2021-01-25

## 2018-06-27 NOTE — PATIENT INSTRUCTIONS
Naproxen delayed-release tablets  What is this medicine?  NAPROXEN (na PROX en) is a non-steroidal anti-inflammatory drug (NSAID). It is used to reduce swelling and to treat pain. This medicine may be used for dental pain, headache, or painful monthly periods. It is also used for painful joint and muscular problems such as arthritis, tendinitis, bursitis, and gout.  How should I use this medicine?  Take this medicine by mouth with a glass of water. Follow the directions on the prescription label. Do not cut, crush or chew this medicine. Take it with food if your stomach gets upset. Try to not lie down for at least 10 minutes after you take it. Take your medicine at regular intervals. Do not take your medicine more often than directed. Long-term, continuous use may increase the risk of heart attack or stroke.  A special MedGuide will be given to you by the pharmacist with each prescription and refill. Be sure to read this information carefully each time.  Talk to your pediatrician regarding the use of this medicine in children. Special care may be needed.  What side effects may I notice from receiving this medicine?  Side effects that you should report to your doctor or health care professional as soon as possible:  · black or bloody stools, blood in the urine or vomit  · blurred vision  · chest pain  · difficulty breathing or wheezing  · nausea or vomiting  · skin rash, skin redness, blistering or peeling skin, hives, or itching  · slurred speech or weakness on one side of the body  · swelling of eyelids, throat, lips  · unexplained weight gain or swelling  · unusually weak or tired  · yellowing of eyes or skin  Side effects that usually do not require medical attention (report to your doctor or health care professional if they continue or are bothersome):  · constipation  · headache  · heartburn  What may interact with this  medicine?  · alcohol  · antacids  · aspirin  · cidofovir  · diuretics  · lithium  · medicines for stomach, or intestine problems, like acid reflux or GERD  · methotrexate  · other drugs for inflammation like ketorolac or prednisone  · pemetrexed  · probenecid  · sucralfate  · warfarin  What if I miss a dose?  If you miss a dose, take it as soon as you can. If it is almost time for your next dose, take only that dose. Do not take double or extra doses.  Where should I keep my medicine?  Keep out of the reach of children.  Store at room temperature between 15 and 30 degrees C (59 and 86 degrees F). Keep container tightly closed. Throw away any unused medicine after the expiration date.  What should I tell my health care provider before I take this medicine?  They need to know if you have any of these conditions:  · asthma  · cigarette smoker  · drink more than 3 alcohol containing drinks a day  · heart disease or circulation problems such as heart failure or leg edema (fluid retention)  · high blood pressure  · kidney disease  · liver disease  · stomach bleeding or ulcers  · an unusual or allergic reaction to naproxen, aspirin, other NSAIDs, other medicines, foods, dyes, or preservatives  · pregnant or trying to get pregnant  · breast-feeding  What should I watch for while using this medicine?  Tell your doctor or health care professional if your pain does not get better. Talk to your doctor before taking another medicine for pain. Do not treat yourself.  This medicine does not prevent heart attack or stroke. In fact, this medicine may increase the chance of a heart attack or stroke. The chance may increase with longer use of this medicine and in people who have heart disease. If you take aspirin to prevent heart attack or stroke, talk with your doctor or health care professional.  Do not take other medicines that contain aspirin, ibuprofen, or naproxen with this medicine. Side effects such as stomach upset, nausea,  or ulcers may be more likely to occur. Many medicines available without a prescription should not be taken with this medicine.  This medicine can cause ulcers and bleeding in the stomach and intestines at any time during treatment. Do not smoke cigarettes or drink alcohol. These increase irritation to your stomach and can make it more susceptible to damage from this medicine. Ulcers and bleeding can happen without warning symptoms and can cause death.  You may get drowsy or dizzy. Do not drive, use machinery, or do anything that needs mental alertness until you know how this medicine affects you. Do not stand or sit up quickly, especially if you are an older patient. This reduces the risk of dizzy or fainting spells.  This medicine can cause you to bleed more easily. Try to avoid damage to your teeth and gums when you brush or floss your teeth.  NOTE:This sheet is a summary. It may not cover all possible information. If you have questions about this medicine, talk to your doctor, pharmacist, or health care provider. Copyright© 2017 [employer]        Exercise for a Healthier Heart  You may wonder how you can improve the health of your heart. If youre thinking about exercise, youre on the right track. You dont need to become an athlete, but you do need a certain amount of brisk exercise to help strengthen your heart. If you have been diagnosed with a heart condition, your doctor may recommend exercise to help stabilize your condition. To help make exercise a habit, choose safe, fun activities.     Exercise with a friend. When activity is fun, you're more likely to stick with it.     Be sure to check with your healthcare provider before starting an exercise program.   Why exercise?  Exercising regularly offers many healthy rewards. It can help you do all of the following:  · Improve your blood cholesterol level to help prevent further heart trouble  · Lower your blood pressure to help prevent a stroke or heart  attack  · Control diabetes, or reduce your risk of getting this disease  · Improve your heart and lung function  · Reach and maintain a healthy weight  · Make your muscles stronger and more limber so you can stay active  · Prevent falls and fractures by slowing the loss of bone mass (osteoporosis)  · Manage stress better  · Reduce your blood pressure  · Improve your sense of self and your body image  Exercise tips  Ease into your routine. Set small goals. Then build on them.  Exercise on most days. Aim for a total of 150 or more minutes of moderate to  vigorous intensity activity each week. Consider 40 minutes, 3 to 4 times a week. For best results, activity should last for 40 minutes on average. It is OK to work up to the 40 minute period over time. Examples of moderate-intensity activity is walking 1 mile in 15 minutes or 30 to 45 minutes of yard work.  Step up your daily activity level. Along with your exercise program, try being more active throughout the day. Walk instead of drive. Do more household tasks or yard work.  Choose one or more activities you enjoy. Walking is one of the easiest things you can do. You can also try swimming, riding a bike, dancing, or taking an exercise class.  Stop exercising and call your doctor if you:  · Have chest pain or feel dizzy or lightheaded  · Feel burning, tightness, pressure, or heaviness in your chest, neck, shoulders, back, or arms  · Have unusual shortness of breath  · Have increased joint or muscle pain  · Have palpitations or an irregular heartbeat   Date Last Reviewed: 5/1/2016  © 1394-5205 [employer], [place]. [address] [place] [address], [place] [address]. All rights reserved. This information is not intended as a substitute for professional medical care. Always follow your healthcare professional's instructions.
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
## 2018-07-10 ENCOUNTER — DOCUMENTATION ONLY (OUTPATIENT)
Dept: REHABILITATION | Facility: OTHER | Age: 28
End: 2018-07-10

## 2018-07-10 NOTE — PROGRESS NOTES
Physical Therapy   Missed [place]    Sarina Ann   MRN: [id number]     2nd No show for Physical Therapy evaluation. Pt was given clinic phone number if she would like to reschedule at later date    Kasandra Mayers, PT  7/10/2018

## 2018-09-10 ENCOUNTER — TELEPHONE (OUTPATIENT)
Dept: INTERNAL MEDICINE | Facility: CLINIC | Age: 28
End: 2018-09-10

## 2018-09-10 NOTE — TELEPHONE ENCOUNTER
----- Message from Shannan Moffett sent at 9/10/2018 11:00 AM CDT -----  Contact: Pt   Pt was calling to see if she can be seen today.    Pt would like a call back at [phone].    Thank You

## 2018-09-10 NOTE — TELEPHONE ENCOUNTER
Call place to pt requesting appointment today for c/o headaches x 1week. No available appointments today. Pt stated she would try to schedule with urgent care. The patient verbalized understanding and had no further questions or concerns.  Marianela GRIFFIN

## 2018-09-11 ENCOUNTER — TELEPHONE (OUTPATIENT)
Dept: UROGYNECOLOGY | Facility: CLINIC | Age: 28
End: 2018-09-11

## 2018-09-11 RX ORDER — VALACYCLOVIR HYDROCHLORIDE 500 MG/1
500 TABLET, FILM COATED ORAL 2 TIMES DAILY
Qty: 6 TABLET | Refills: 2 | Status: SHIPPED | OUTPATIENT
Start: 2018-09-11 | End: 2018-10-10 | Stop reason: ALTCHOICE

## 2018-09-11 NOTE — TELEPHONE ENCOUNTER
----- Message from Nya Forrester sent at 9/11/2018 11:07 AM CDT -----  Name of Who is Calling: GUY CHAPPELL [[id number]]      What is the request in detail: Pt needs a refill on her antibiotics but does not know the name of them.      Can the clinic reply by [facility]:  Yes       What Number to Call Back if not in [facility]: [phone]

## 2018-09-11 NOTE — TELEPHONE ENCOUNTER
Pt states she has one white spot on her vagina and would like some Valtrex called in to her pharmacy. Pt denied any burning, pain or discomfort at the time. Pt last seen on 4/30 by LEIDY Adair and states she was told to call the office for future refills of the medication.  I informed the pt I'd relay this message to LEIDY Adair and someone would get back to her. Pt voiced understanding and call ended.

## 2018-09-14 ENCOUNTER — TELEPHONE (OUTPATIENT)
Dept: UROGYNECOLOGY | Facility: CLINIC | Age: 28
End: 2018-09-14

## 2018-09-14 NOTE — TELEPHONE ENCOUNTER
Spoke with pt and informed her, her medication was refilled on 9/11/18 and sent to her [facility]'s pharmacy, pt voiced understanding and call was ended.

## 2018-09-14 NOTE — TELEPHONE ENCOUNTER
----- Message from Dmitry Berry sent at 9/14/2018  1:56 PM CDT -----  Pt returning your call [phone]

## 2018-10-09 ENCOUNTER — TELEPHONE (OUTPATIENT)
Dept: UROGYNECOLOGY | Facility: CLINIC | Age: 28
End: 2018-10-09

## 2018-10-09 NOTE — TELEPHONE ENCOUNTER
----- Message from Xu Sewell sent at 10/9/2018  9:02 AM CDT -----  Contact: Sarina Ann            Name of Who is Calling: Sarina Ann      What is the request in detail: Patient called in regards to vaginal burning and odor      Can the clinic reply by [facility]: No      What Number to Call Back if not in [facility]: [phone]

## 2018-10-09 NOTE — TELEPHONE ENCOUNTER
Spoke with pt and schedule an appointment for tomorrow , pt voiced understanding and call was ended.

## 2018-10-10 ENCOUNTER — OFFICE VISIT (OUTPATIENT)
Dept: UROGYNECOLOGY | Facility: CLINIC | Age: 28
End: 2018-10-10
Payer: MEDICAID

## 2018-10-10 ENCOUNTER — TELEPHONE (OUTPATIENT)
Dept: INTERNAL MEDICINE | Facility: CLINIC | Age: 28
End: 2018-10-10

## 2018-10-10 VITALS
DIASTOLIC BLOOD PRESSURE: 60 MMHG | BODY MASS INDEX: 39.25 KG/M2 | WEIGHT: 244.25 LBS | HEIGHT: 66 IN | SYSTOLIC BLOOD PRESSURE: 100 MMHG

## 2018-10-10 DIAGNOSIS — N89.8 VAGINAL DISCHARGE: Primary | ICD-10-CM

## 2018-10-10 PROCEDURE — 99999 PR PBB SHADOW E&M-EST. PATIENT-LVL III: CPT | Mod: PBBFAC,,, | Performed by: NURSE PRACTITIONER

## 2018-10-10 PROCEDURE — 99213 OFFICE O/P EST LOW 20 MIN: CPT | Mod: S$PBB,,, | Performed by: NURSE PRACTITIONER

## 2018-10-10 PROCEDURE — 87660 TRICHOMONAS VAGIN DIR PROBE: CPT

## 2018-10-10 PROCEDURE — 99213 OFFICE O/P EST LOW 20 MIN: CPT | Mod: PBBFAC | Performed by: NURSE PRACTITIONER

## 2018-10-10 NOTE — TELEPHONE ENCOUNTER
----- Message from Kortney Lopez sent at 10/10/2018 11:37 AM CDT -----  Contact: patient  Patient called to schedule an urgent appointment specifically with Dr Campo  And wishes to speak with a nurse regarding this matter.      she can be reached at [phone]    Thanks  KB

## 2018-10-10 NOTE — PROGRESS NOTES
10/10/2018    SUBJECTIVE:   27 y.o. female for complaints of vaginal irritation x 3 days with vaginal odor.  Tried new body wash last week.    Past Medical History:   Diagnosis Date    Allergy     Recurrent upper respiratory infection (URI)        Past Surgical History:   Procedure Laterality Date    NO PAST SURGERIES         Current Outpatient Medications   Medication Sig Dispense Refill    azelastine (ASTELIN) 137 mcg (0.1 %) nasal spray 1-2 sprays (137-274 mcg total) by Nasal route 2 (two) times daily as needed for Rhinitis. 30 mL 5    cetirizine (ZYRTEC) 10 MG tablet Take 10 mg by mouth once daily.      diphenhydrAMINE (BENADRYL) 12.5 mg/5 mL elixir Take by mouth every 8 (eight) hours.      fluticasone (FLONASE) 50 mcg/actuation nasal spray 1 spray by Each Nare route 2 (two) times daily as needed. 15 g 0    naproxen (EC-NAPROSYN) 375 MG TbEC EC tablet Take 1 tablet (375 mg total) by mouth 2 (two) times daily with meals. 20 tablet 0     No current facility-administered medications for this visit.      Allergies: Ceftriaxone   Patient's last menstrual period was 2018.    Well Woman:  Pap:2017 normal  Mammo:n/a  Colonoscopy:n/a  Dexa:n/a    Family History  Family History   Problem Relation Age of Onset    Colon cancer Father     Cancer Father 46        colon ca    Diabetes Mother     Hypertension Mother     Breast cancer Neg Hx     Eclampsia Neg Hx     Miscarriages / Stillbirths Neg Hx     Ovarian cancer Neg Hx      labor Neg Hx     Stroke Neg Hx     Cervical cancer Neg Hx     Vaginal cancer Neg Hx     Endometrial cancer Neg Hx           ROS:  Feeling well.   No abdominal pain, change in bowel habits, black or bloody stools.    No urinary tract symptoms.   GYN ROS: she complains of pain on L labia   No neurological complaints.    OBJECTIVE:   The patient appears well, alert, oriented x 3, in no distress.  /60 (BP Location: Right arm, Patient Position: Sitting, BP Method:  "Medium (Manual))   Ht 5' 6" (1.676 m)   Wt 110.8 kg (244 lb 4.3 oz)   LMP 09/25/2018   BMI 39.43 kg/m²   Abdomen soft without tenderness, guarding, mass or organomegaly.   Extremities show no edema, normal peripheral pulses.   Neurological is normal, no focal findings.      PELVIC EXAM:   VULVA: normal appearing vulva with no masses, tenderness or lesions,   VAGINA: vaginal discharge - white and malodorous,  CERVIX: normal appearing cervix without discharge or lesions,   UTERUS: uterus is normal size, shape, consistency and nontender,   ADNEXA: no masses,   RECTAL: deferred        ASSESSMENT:   1. Vaginal discharge  Vaginosis Screen by DNA Probe       PLAN:     1. Vaginal discharge  --affirm today  --will treat if indicated    2. RTC 12/2018 for annual      25 minutes were spent in face to face time with this patient  75 % of this time was spent in counseling and/or coordination of care    Judy MAIN [facility]  Division of Female Pelvic Medicine and Reconstructive Surgery  Department of Obstetrics & Gynecology          "

## 2018-10-11 ENCOUNTER — PATIENT MESSAGE (OUTPATIENT)
Dept: UROGYNECOLOGY | Facility: CLINIC | Age: 28
End: 2018-10-11

## 2018-10-11 ENCOUNTER — OFFICE VISIT (OUTPATIENT)
Dept: INTERNAL MEDICINE | Facility: CLINIC | Age: 28
End: 2018-10-11
Payer: MEDICAID

## 2018-10-11 ENCOUNTER — TELEPHONE (OUTPATIENT)
Dept: UROGYNECOLOGY | Facility: CLINIC | Age: 28
End: 2018-10-11

## 2018-10-11 VITALS
OXYGEN SATURATION: 99 % | DIASTOLIC BLOOD PRESSURE: 68 MMHG | HEIGHT: 66 IN | SYSTOLIC BLOOD PRESSURE: 120 MMHG | WEIGHT: 241.63 LBS | BODY MASS INDEX: 38.83 KG/M2 | HEART RATE: 85 BPM

## 2018-10-11 DIAGNOSIS — J02.9 SORE THROAT: ICD-10-CM

## 2018-10-11 DIAGNOSIS — A59.9 TRICHOMONAL INFECTION: Primary | ICD-10-CM

## 2018-10-11 DIAGNOSIS — J01.90 ACUTE SINUSITIS, RECURRENCE NOT SPECIFIED, UNSPECIFIED LOCATION: Primary | ICD-10-CM

## 2018-10-11 LAB
CANDIDA RRNA VAG QL PROBE: NEGATIVE
G VAGINALIS RRNA GENITAL QL PROBE: NEGATIVE
T VAGINALIS RRNA GENITAL QL PROBE: POSITIVE

## 2018-10-11 PROCEDURE — 99213 OFFICE O/P EST LOW 20 MIN: CPT | Mod: PBBFAC | Performed by: NURSE PRACTITIONER

## 2018-10-11 PROCEDURE — 99999 PR PBB SHADOW E&M-EST. PATIENT-LVL III: CPT | Mod: PBBFAC,,, | Performed by: NURSE PRACTITIONER

## 2018-10-11 PROCEDURE — 99213 OFFICE O/P EST LOW 20 MIN: CPT | Mod: S$PBB,,, | Performed by: NURSE PRACTITIONER

## 2018-10-11 RX ORDER — METRONIDAZOLE 500 MG/1
2000 TABLET ORAL ONCE
Qty: 4 TABLET | Refills: 0 | Status: SHIPPED | OUTPATIENT
Start: 2018-10-11 | End: 2018-10-11

## 2018-10-11 RX ORDER — METHYLPREDNISOLONE 4 MG/1
TABLET ORAL
Qty: 21 PACKAGE | Refills: 0 | Status: SHIPPED | OUTPATIENT
Start: 2018-10-11 | End: 2018-11-01 | Stop reason: ALTCHOICE

## 2018-10-11 RX ORDER — AZITHROMYCIN 250 MG/1
TABLET, FILM COATED ORAL
Qty: 6 TABLET | Refills: 0 | Status: SHIPPED | OUTPATIENT
Start: 2018-10-11 | End: 2018-10-16

## 2018-10-11 NOTE — PROGRESS NOTES
Subjective:       Patient ID: Sarina Ann is a 27 y.o. female.    Chief Complaint: No chief complaint on file.    Sore Throat    This is a new problem. The current episode started in the past 7 days ( 5 days). The problem has been unchanged. The pain is worse on the left side. There has been no fever. The patient is experiencing no pain. Associated symptoms include congestion, coughing and ear pain. Pertinent negatives include no abdominal pain, diarrhea, drooling, ear discharge, headaches, hoarse voice, plugged ear sensation, neck pain, shortness of breath, stridor, swollen glands, trouble swallowing or vomiting. She has had exposure to strep. She has had no exposure to mono. Treatments tried: chloraseptic spray. The treatment provided mild relief.   Sinusitis   This is a new problem. The current episode started in the past 7 days. The problem is unchanged. There has been no fever. Associated symptoms include congestion, coughing, ear pain, sinus pressure and a sore throat. Pertinent negatives include no chills, diaphoresis, headaches, hoarse voice, neck pain, shortness of breath, sneezing or swollen glands. (PND) Past treatments include antibiotics (She reports self medicating with amoxicillin from an old prescription). The treatment provided no relief.       Past Medical History: Patient has a past medical history of Allergy and Recurrent upper respiratory infection (URI).    Past Surgical History: Patient has a past surgical history that includes No past surgeries.    Social History: Patient reports that  has never smoked. she has never used smokeless tobacco. She reports that she drinks alcohol. She reports that she does not use drugs.    Family History: family history includes Cancer (age of onset: 46) in her father; Colon cancer in her father; Diabetes in her mother; Hypertension in her mother.    Medications:   Current Outpatient Medications   Medication Sig    azelastine (ASTELIN) 137 mcg (0.1 %)  nasal spray 1-2 sprays (137-274 mcg total) by Nasal route 2 (two) times daily as needed for Rhinitis.    cetirizine (ZYRTEC) 10 MG tablet Take 10 mg by mouth once daily.    diphenhydrAMINE (BENADRYL) 12.5 mg/5 mL elixir Take by mouth every 8 (eight) hours.    fluticasone (FLONASE) 50 mcg/actuation nasal spray 1 spray by Each Nare route 2 (two) times daily as needed.    naproxen (EC-NAPROSYN) 375 MG TbEC EC tablet Take 1 tablet (375 mg total) by mouth 2 (two) times daily with meals.    azithromycin (Z-PATRICIA) 250 MG tablet Take 2 tablets by mouth on day 1; Take 1 tablet by mouth on days 2-5    methylPREDNISolone (MEDROL DOSEPACK) 4 mg tablet use as directed     No current facility-administered medications for this visit.        Allergies: Patient is allergic to ceftriaxone.    Review of Systems   Constitutional: Negative for activity change, appetite change, chills, diaphoresis, fatigue, fever and unexpected weight change.   HENT: Positive for congestion, ear pain, sinus pressure and sore throat. Negative for dental problem, drooling, ear discharge, facial swelling, hearing loss, hoarse voice, nosebleeds, postnasal drip, rhinorrhea, sneezing, tinnitus, trouble swallowing and voice change.    Eyes: Negative for pain and visual disturbance.   Respiratory: Positive for cough. Negative for chest tightness, shortness of breath, wheezing and stridor.    Cardiovascular: Negative for chest pain.   Gastrointestinal: Negative for abdominal pain, diarrhea and vomiting.   Musculoskeletal: Negative for gait problem and neck pain.   Skin: Negative for color change and rash.   Allergic/Immunologic: Negative for environmental allergies.   Neurological: Negative for dizziness, seizures, syncope, facial asymmetry, speech difficulty, weakness, light-headedness, numbness and headaches.   Psychiatric/Behavioral: Negative for agitation and confusion. The patient is not nervous/anxious.        Objective:       /68 (BP Location:  "Left arm, Patient Position: Sitting)   Pulse 85   Ht 5' 6" (1.676 m)   Wt 109.6 kg (241 lb 10 oz)   LMP 09/25/2018   SpO2 99%   BMI 39.00 kg/m²     Physical Exam   Constitutional: She is oriented to person, place, and time.   Obese   HENT:   Head: Normocephalic and atraumatic. Not macrocephalic and not microcephalic. Head is without raccoon's eyes, without Schultz's sign, without abrasion, without contusion, without laceration, without right periorbital erythema and without left periorbital erythema. Hair is normal.   Right Ear: Tympanic membrane, external ear and ear canal normal. No lacerations. No drainage, swelling or tenderness. No foreign bodies. No mastoid tenderness. Tympanic membrane is not injected, not scarred, not perforated, not erythematous, not retracted and not bulging. Tympanic membrane mobility is normal. No middle ear effusion. No hemotympanum. No decreased hearing is noted.   Left Ear: Tympanic membrane, external ear and ear canal normal. No lacerations. No drainage, swelling or tenderness. No foreign bodies. No mastoid tenderness. Tympanic membrane is not injected, not scarred, not perforated, not erythematous, not retracted and not bulging. Tympanic membrane mobility is normal.  No middle ear effusion. No hemotympanum. No decreased hearing is noted.   Nose: No mucosal edema, rhinorrhea, nose lacerations, sinus tenderness, nasal deformity, septal deviation or nasal septal hematoma. No epistaxis.  No foreign bodies. Right sinus exhibits maxillary sinus tenderness and frontal sinus tenderness. Left sinus exhibits maxillary sinus tenderness and frontal sinus tenderness.   Mouth/Throat: Uvula is midline, oropharynx is clear and moist and mucous membranes are normal. Mucous membranes are not pale, not dry and not cyanotic. She does not have dentures. No oral lesions. No trismus in the jaw. Normal dentition. No dental abscesses, uvula swelling, lacerations or dental caries. No oropharyngeal " exudate, posterior oropharyngeal edema, posterior oropharyngeal erythema or tonsillar abscesses. No tonsillar exudate.   Eyes: Conjunctivae and lids are normal. No scleral icterus.   Neck: Trachea normal. Neck supple. No spinous process tenderness and no muscular tenderness present. No neck rigidity. No edema, no erythema and normal range of motion present. No thyroid mass and no thyromegaly present.   Cardiovascular: Normal rate, regular rhythm, normal heart sounds and intact distal pulses. Exam reveals no gallop and no friction rub.   No murmur heard.  Pulmonary/Chest: Effort normal and breath sounds normal. No stridor. No respiratory distress. She has no wheezes. She has no rales. She exhibits no tenderness.   Abdominal: Soft. Bowel sounds are normal. She exhibits no distension.   Musculoskeletal: Normal range of motion.   Lymphadenopathy:        Head (right side): No submental, no submandibular, no tonsillar, no preauricular and no posterior auricular adenopathy present.        Head (left side): No submental, no submandibular, no tonsillar, no preauricular, no posterior auricular and no occipital adenopathy present.   Neurological: She is alert and oriented to person, place, and time.   Skin: Skin is warm and dry.   Psychiatric: She has a normal mood and affect. Her behavior is normal. Judgment and thought content normal.   Vitals reviewed.      Assessment:       1. Acute sinusitis, recurrence not specified, unspecified location    2. Sore throat        Plan:       Medrol dose pack.  Z-pack.  OTC Tylenol prn.  Matt Med Sinus Rinse; distilled water only(maintenance).  OTC Flonase BID (spray laterally).  Astelin BID (spray laterally).  Warm salt water gargles(1/2 teaspoon of salt to 1 cup warm water and gargle as desired).  Warm tea with honey.  Throat lozenges.  Fluids.  Rest.  OTC Claritin/Zyrtec prn (allergy symptoms).  F/U with PCP.  RTC prn

## 2018-10-11 NOTE — PATIENT INSTRUCTIONS
Medrol dose pack.  Z-pack.  OTC Tylenol prn.  Matt Med Sinus Rinse; distilled water only(maintenance).  OTC Flonase BID (spray laterally).  Astelin BID (spray laterally).  Warm salt water gargles(1/2 teaspoon of salt to 1 cup warm water and gargle as desired).  Warm tea with honey.  Throat lozenges.  Fluids.  Rest.  OTC Claritin/Zyrtec prn (allergy symptoms).  F/U with PCP.  RTC prn.

## 2018-10-29 ENCOUNTER — TELEPHONE (OUTPATIENT)
Dept: UROGYNECOLOGY | Facility: CLINIC | Age: 28
End: 2018-10-29

## 2018-10-29 NOTE — TELEPHONE ENCOUNTER
----- Message from Allison Mathis sent at 10/29/2018  2:07 PM CDT -----  Contact: pt            Name of Who is Calling: GUY CHAPPELL [[id number]]      What is the request in detail: pt returning call.. Please advise      Can the clinic reply by [facility]: no      What Number [facility] Back [facility]: [phone]

## 2018-10-29 NOTE — TELEPHONE ENCOUNTER
Returned pt call no answer, Left voice message for pt to give the office a call back at [phone].

## 2018-11-01 ENCOUNTER — LAB VISIT (OUTPATIENT)
Dept: LAB | Facility: OTHER | Age: 28
End: 2018-11-01
Payer: MEDICAID

## 2018-11-01 ENCOUNTER — OFFICE VISIT (OUTPATIENT)
Dept: UROGYNECOLOGY | Facility: CLINIC | Age: 28
End: 2018-11-01
Payer: MEDICAID

## 2018-11-01 VITALS
DIASTOLIC BLOOD PRESSURE: 80 MMHG | WEIGHT: 243.38 LBS | BODY MASS INDEX: 39.12 KG/M2 | SYSTOLIC BLOOD PRESSURE: 120 MMHG | HEIGHT: 66 IN

## 2018-11-01 DIAGNOSIS — N89.8 VAGINAL DISCHARGE: ICD-10-CM

## 2018-11-01 DIAGNOSIS — N92.6 MISSED MENSES: ICD-10-CM

## 2018-11-01 DIAGNOSIS — B96.89 BACTERIAL VAGINOSIS: ICD-10-CM

## 2018-11-01 DIAGNOSIS — N92.6 MISSED MENSES: Primary | ICD-10-CM

## 2018-11-01 DIAGNOSIS — N76.0 BACTERIAL VAGINOSIS: ICD-10-CM

## 2018-11-01 LAB
CANDIDA RRNA VAG QL PROBE: NEGATIVE
G VAGINALIS RRNA GENITAL QL PROBE: POSITIVE
HCG INTACT+B SERPL-ACNC: <1.2 MIU/ML
T VAGINALIS RRNA GENITAL QL PROBE: NEGATIVE

## 2018-11-01 PROCEDURE — 84702 CHORIONIC GONADOTROPIN TEST: CPT

## 2018-11-01 PROCEDURE — 99999 PR PBB SHADOW E&M-EST. PATIENT-LVL III: CPT | Mod: PBBFAC,,, | Performed by: NURSE PRACTITIONER

## 2018-11-01 PROCEDURE — 36415 COLL VENOUS BLD VENIPUNCTURE: CPT

## 2018-11-01 PROCEDURE — 87660 TRICHOMONAS VAGIN DIR PROBE: CPT

## 2018-11-01 PROCEDURE — 99213 OFFICE O/P EST LOW 20 MIN: CPT | Mod: S$PBB,,, | Performed by: NURSE PRACTITIONER

## 2018-11-01 PROCEDURE — 99213 OFFICE O/P EST LOW 20 MIN: CPT | Mod: PBBFAC | Performed by: NURSE PRACTITIONER

## 2018-11-01 RX ORDER — METRONIDAZOLE 500 MG/1
TABLET ORAL
Refills: 0 | COMMUNITY
Start: 2018-10-11 | End: 2018-11-01 | Stop reason: ALTCHOICE

## 2018-11-02 ENCOUNTER — TELEPHONE (OUTPATIENT)
Dept: UROGYNECOLOGY | Facility: CLINIC | Age: 28
End: 2018-11-02

## 2018-11-02 RX ORDER — METRONIDAZOLE 500 MG/1
500 TABLET ORAL EVERY 12 HOURS
Qty: 14 TABLET | Refills: 0 | Status: SHIPPED | OUTPATIENT
Start: 2018-11-02 | End: 2018-11-09

## 2018-11-02 NOTE — TELEPHONE ENCOUNTER
Spoke to pt she was informed that her vaginal culture was positive for BV and an RX for treatment was sent to her pharmacy. Pt verbalizes understanding.    Pt also wants to know the results of her hcg. Please advise

## 2018-11-02 NOTE — TELEPHONE ENCOUNTER
----- Message from Judy Adair NP sent at 11/2/2018  4:25 PM CDT -----  Please let patient know she tested positive for bacterial vaginitis.  I sent in a prescription for flagyl.  Thanks,  Judy

## 2018-11-06 ENCOUNTER — TELEPHONE (OUTPATIENT)
Dept: UROGYNECOLOGY | Facility: CLINIC | Age: 28
End: 2018-11-06

## 2018-11-06 NOTE — TELEPHONE ENCOUNTER
Reviewed negative hcg test. She has still not started menstruation. Will call next week-- if > 2 weeks late, will repeat hcg and other testing. LINN Conteh-BC

## 2018-11-06 NOTE — TELEPHONE ENCOUNTER
Pt would like to discuss her hCG results.  I informed the pt I'd relay this message to LEIDY Adair and one of us would get back to her. Pt voiced understanding and call ended.

## 2018-11-06 NOTE — TELEPHONE ENCOUNTER
----- Message from Genia Peterson sent at 11/6/2018 11:12 AM CST -----  Contact: Patient herself      Name of Who is Calling:   Patient herself      What is the request in detail:   Patient called requesting her test results.  Please give a call back at your earliest convenience.     THANKS!      Can the clinic reply by [facility]:  No      Number to Call Back:   Patient / # [phone]

## 2018-11-10 NOTE — PROGRESS NOTES
"10/10/2018    SUBJECTIVE:   27 y.o. female for test of cure.  Was treated for trich.    Past Medical History:   Diagnosis Date    Allergy     Recurrent upper respiratory infection (URI)        Past Surgical History:   Procedure Laterality Date    NO PAST SURGERIES         Current Outpatient Medications   Medication Sig Dispense Refill    azelastine (ASTELIN) 137 mcg (0.1 %) nasal spray 1-2 sprays (137-274 mcg total) by Nasal route 2 (two) times daily as needed for Rhinitis. 30 mL 5    cetirizine (ZYRTEC) 10 MG tablet Take 10 mg by mouth once daily.      diphenhydrAMINE (BENADRYL) 12.5 mg/5 mL elixir Take by mouth every 8 (eight) hours.      fluticasone (FLONASE) 50 mcg/actuation nasal spray 1 spray by Each Nare route 2 (two) times daily as needed. 15 g 0    naproxen (EC-NAPROSYN) 375 MG TbEC EC tablet Take 1 tablet (375 mg total) by mouth 2 (two) times daily with meals. 20 tablet 0     No current facility-administered medications for this visit.      Allergies: Ceftriaxone   Patient's last menstrual period was 2018.    Well Woman:  Pap:2017 normal  Mammo:n/a  Colonoscopy:n/a  Dexa:n/a    Family History  Family History   Problem Relation Age of Onset    Colon cancer Father     Cancer Father 46        colon ca    Diabetes Mother     Hypertension Mother     Breast cancer Neg Hx     Eclampsia Neg Hx     Miscarriages / Stillbirths Neg Hx     Ovarian cancer Neg Hx      labor Neg Hx     Stroke Neg Hx     Cervical cancer Neg Hx     Vaginal cancer Neg Hx     Endometrial cancer Neg Hx           ROS:  Feeling well.   No abdominal pain, change in bowel habits, black or bloody stools.    No urinary tract symptoms.   GYN ROS: she complains of pain on L labia   No neurological complaints.    OBJECTIVE:   The patient appears well, alert, oriented x 3, in no distress.  /80 (BP Location: Right arm, Patient Position: Sitting, BP Method: Large (Manual))   Ht 5' 6" (1.676 m)   Wt 110.4 kg " (243 lb 6.2 oz)   LMP 09/25/2018   BMI 39.28 kg/m²   Abdomen soft without tenderness, guarding, mass or organomegaly.   Extremities show no edema, normal peripheral pulses.   Neurological is normal, no focal findings.      PELVIC EXAM:   VULVA: normal appearing vulva with no masses, tenderness or lesions,   VAGINA: vaginal discharge - white and malodorous,  CERVIX: normal appearing cervix without discharge or lesions,   UTERUS: uterus is normal size, shape, consistency and nontender,   ADNEXA: no masses,   RECTAL: deferred        ASSESSMENT:   1. Missed menses  hCG, quantitative   2. Vaginal discharge  Vaginosis Screen by DNA Probe   3. Bacterial vaginosis  metroNIDAZOLE (FLAGYL) 500 MG tablet       PLAN:     1. Vaginal discharge  --affirm today  --will treat if indicated    2. RTC 12/2018 for annual      25 minutes were spent in face to face time with this patient  75 % of this time was spent in counseling and/or coordination of care    Judy MAIN [facility]  Division of Female Pelvic Medicine and Reconstructive Surgery  Department of Obstetrics & Gynecology

## 2018-11-16 ENCOUNTER — PATIENT MESSAGE (OUTPATIENT)
Dept: UROGYNECOLOGY | Facility: CLINIC | Age: 28
End: 2018-11-16

## 2018-11-16 ENCOUNTER — TELEPHONE (OUTPATIENT)
Dept: UROGYNECOLOGY | Facility: CLINIC | Age: 28
End: 2018-11-16

## 2018-11-16 ENCOUNTER — LAB VISIT (OUTPATIENT)
Dept: LAB | Facility: OTHER | Age: 28
End: 2018-11-16
Payer: MEDICAID

## 2018-11-16 DIAGNOSIS — R30.0 DYSURIA: Primary | ICD-10-CM

## 2018-11-16 DIAGNOSIS — N30.00 ACUTE CYSTITIS WITHOUT HEMATURIA: Primary | ICD-10-CM

## 2018-11-16 DIAGNOSIS — N92.6 MISSED MENSES: ICD-10-CM

## 2018-11-16 DIAGNOSIS — R30.0 DYSURIA: ICD-10-CM

## 2018-11-16 LAB
BACTERIA #/AREA URNS HPF: ABNORMAL /HPF
BILIRUB UR QL STRIP: NEGATIVE
CLARITY UR: ABNORMAL
COLOR UR: YELLOW
GLUCOSE UR QL STRIP: NEGATIVE
HGB UR QL STRIP: ABNORMAL
KETONES UR QL STRIP: NEGATIVE
LEUKOCYTE ESTERASE UR QL STRIP: ABNORMAL
MICROSCOPIC COMMENT: ABNORMAL
NITRITE UR QL STRIP: NEGATIVE
PH UR STRIP: 8 [PH] (ref 5–8)
PROT UR QL STRIP: NEGATIVE
RBC #/AREA URNS HPF: 10 /HPF (ref 0–4)
SP GR UR STRIP: 1.01 (ref 1–1.03)
SQUAMOUS #/AREA URNS HPF: 3 /HPF
URN SPEC COLLECT METH UR: ABNORMAL
UROBILINOGEN UR STRIP-ACNC: NEGATIVE EU/DL
WBC #/AREA URNS HPF: 80 /HPF (ref 0–5)
WBC CLUMPS URNS QL MICRO: ABNORMAL
YEAST URNS QL MICRO: ABNORMAL

## 2018-11-16 PROCEDURE — 81000 URINALYSIS NONAUTO W/SCOPE: CPT

## 2018-11-16 PROCEDURE — 87086 URINE CULTURE/COLONY COUNT: CPT

## 2018-11-16 PROCEDURE — 87186 SC STD MICRODIL/AGAR DIL: CPT | Mod: 59

## 2018-11-16 PROCEDURE — 87088 URINE BACTERIA CULTURE: CPT

## 2018-11-16 PROCEDURE — 87077 CULTURE AEROBIC IDENTIFY: CPT

## 2018-11-16 RX ORDER — NITROFURANTOIN 25; 75 MG/1; MG/1
100 CAPSULE ORAL 2 TIMES DAILY
Qty: 14 CAPSULE | Refills: 0 | Status: SHIPPED | OUTPATIENT
Start: 2018-11-16 | End: 2019-03-09

## 2018-11-16 NOTE — TELEPHONE ENCOUNTER
Patient still has not started her menses.  Complains of dysuria.  Orders placed.  Judy Adair, MARYANNP-BC

## 2018-11-18 LAB
BACTERIA UR CULT: NORMAL
BACTERIA UR CULT: NORMAL

## 2018-12-11 ENCOUNTER — TELEPHONE (OUTPATIENT)
Dept: UROGYNECOLOGY | Facility: CLINIC | Age: 28
End: 2018-12-11

## 2018-12-11 ENCOUNTER — TELEPHONE (OUTPATIENT)
Dept: INTERNAL MEDICINE | Facility: CLINIC | Age: 28
End: 2018-12-11

## 2018-12-11 DIAGNOSIS — N92.6 MISSED MENSES: Primary | ICD-10-CM

## 2018-12-11 NOTE — TELEPHONE ENCOUNTER
----- Message from Zain Alamo sent at 12/11/2018 10:18 AM CST -----  Contact: GUY CHAPPELL [[id number]]      Name of Who is Calling: GUY CHAPPELL [[id number]]      What is the request in detail: Patient would like to speak with staff in regards to having test ran for not having a cycle. Please advise      Can the clinic reply by [facility]: yes      What Number to Call Back if not in [facility]: [phone]

## 2018-12-11 NOTE — TELEPHONE ENCOUNTER
----- Message from Zain Alamo sent at 12/11/2018 10:20 AM CST -----  Contact: GUY CHAPPELL [[id number]]            Name of Who is Calling: GUY CHAPPELL [[id number]]      What is the request in detail: (m) Would like to speak with staff in regards to getting an appointment for sinus infection. Please advise      Can the clinic reply by [facility]: yes      [facility]: [phone]

## 2018-12-11 NOTE — TELEPHONE ENCOUNTER
Pt states she has not had a cycle in a few months and her pregnancy tests are negative. Pt states she would like to know what the next step is because she thinks something is wrong.  I informed the pt I'd relay this message to LEIDY Adair and one of us would get back to her. Pt voiced understanding and call ended.

## 2018-12-12 ENCOUNTER — OFFICE VISIT (OUTPATIENT)
Dept: INTERNAL MEDICINE | Facility: CLINIC | Age: 28
End: 2018-12-12
Payer: MEDICAID

## 2018-12-12 VITALS
HEART RATE: 95 BPM | HEIGHT: 66 IN | SYSTOLIC BLOOD PRESSURE: 118 MMHG | DIASTOLIC BLOOD PRESSURE: 70 MMHG | TEMPERATURE: 98 F | WEIGHT: 241.19 LBS | OXYGEN SATURATION: 96 % | BODY MASS INDEX: 38.76 KG/M2

## 2018-12-12 DIAGNOSIS — R05.9 COUGH: ICD-10-CM

## 2018-12-12 DIAGNOSIS — E66.9 OBESITY (BMI 35.0-39.9 WITHOUT COMORBIDITY): ICD-10-CM

## 2018-12-12 DIAGNOSIS — J01.90 ACUTE RHINOSINUSITIS: Primary | ICD-10-CM

## 2018-12-12 PROCEDURE — 99213 OFFICE O/P EST LOW 20 MIN: CPT | Mod: S$PBB,,, | Performed by: STUDENT IN AN ORGANIZED HEALTH CARE EDUCATION/TRAINING PROGRAM

## 2018-12-12 PROCEDURE — 99213 OFFICE O/P EST LOW 20 MIN: CPT | Mod: PBBFAC | Performed by: STUDENT IN AN ORGANIZED HEALTH CARE EDUCATION/TRAINING PROGRAM

## 2018-12-12 PROCEDURE — 99999 PR PBB SHADOW E&M-EST. PATIENT-LVL III: CPT | Mod: PBBFAC,,, | Performed by: STUDENT IN AN ORGANIZED HEALTH CARE EDUCATION/TRAINING PROGRAM

## 2018-12-12 RX ORDER — BENZONATATE 100 MG/1
100 CAPSULE ORAL 3 TIMES DAILY PRN
Qty: 30 CAPSULE | Refills: 10 | Status: SHIPPED | OUTPATIENT
Start: 2018-12-12 | End: 2018-12-22

## 2018-12-12 NOTE — PATIENT INSTRUCTIONS
Continue Flonase - 2 sprays in each nostril twice a day for 1 week, then once a day thereafter until symptoms resolve.     Continue Benadryl or Zyrtec    Saline nose irrigation    Sudafed to assist with ear congestion    If symptoms don't improve in 7 days please call back for follow up appointment    For your symptoms (sinusitis, congestion, cough):  1. Saline nasal spray ([employer]) or nasal rinse ([employer], [employer]) at least 2-3 times/day  2. Flonase (fluticasone) or other steroid nasal spray - twice a day for 1 week, then once a day thereafter  3. Claritin (loratadine), Zyrtec (cetirizine), or Allegra (fexofenadine) once a day  4. Mucinex (guaifenesin) every 8-12 hours with plenty of water. If you are having a cough, you can use Mucinex-DM (guaifenesin-dextromethorphan).   5. Hot soup, hot tea with honey and lemon.  6. Plenty of sleep!    These medications are ok to take even if you have high blood pressure.       Sinusitis (No Antibiotics)    The sinuses are air-filled spaces within the bones of the face. They connect to the inside of the nose. Sinusitis is an inflammation of the tissue lining the sinus cavity. Sinus inflammation can occur during a cold. It can also be due to allergies to pollens and other particles in the air. It can cause symptoms such as sinus congestion, headache, sore throat, facial swelling and fullness. It may also cause a low-grade fever. No infection is present, and no antibiotic treatment is needed.  Home care  · Drink plenty of water, hot tea, and other liquids. This may help thin mucus. It also may promote sinus drainage.  · Heat may help soothe painful areas of the face. Use a towel soaked in hot water. Or,  the shower and direct the hot spray onto your face. Using a vaporizer along with a menthol rub at night may also help.   · An expectorant containing guaifenesin may help thin the mucus and promote drainage from the sinuses.  · Over-the-counter decongestants may be used  unless a similar medicine was prescribed. Nasal sprays work the fastest. Use one that contains phenylephrine or oxymetazoline. First blow the nose gently. Then use the spray. Do not use these medicines more often than directed on the label or symptoms may get worse. You may also use tablets containing pseudoephedrine. Avoid products that combine ingredients, because side effects may be increased. Read labels. You can also ask the pharmacist for help. (NOTE: Persons with high blood pressure should not use decongestants. They can raise blood pressure.)  · Over-the-counter antihistamines may help if allergies contributed to your sinusitis.    · Use acetaminophen or ibuprofen to control pain, unless another pain medicine was prescribed. (If you have chronic liver or kidney disease or ever had a stomach ulcer, talk with your doctor before using these medicines. Aspirin should never be used in anyone under 18 years of age who is ill with a fever. It may cause severe liver damage.)  · Use nasal rinses or irrigation as instructed by your health care provider.  · Don't smoke. This can worsen symptoms.  Follow-up care  Follow up with your healthcare provider or our staff if you are not improving within the next week.  When to seek medical advice  Call your healthcare provider if any of these occur:  · Green or yellow discharge from the nose or into the throat  · Facial pain or headache becoming more severe  · Stiff neck  · Unusual drowsiness or confusion  · Swelling of the forehead or eyelids  · Vision problems, including blurred or double vision  · Fever of 100.4ºF (38ºC) or higher, or as directed by your healthcare provider  · Seizure  · Breathing problems  · Symptoms not resolving within 10 days  Date Last Reviewed: 4/13/2015 © 2000-2017 [employer]. [address] [place] [address]. All rights reserved. This information is not intended as a substitute for professional medical care. Always follow  your healthcare professional's instructions.

## 2018-12-12 NOTE — PROGRESS NOTES
I have reviewed the patient's chart in detail and discussed patient's case with the resident. I concur with the resident's history, physical, assessment, and plan.    Symptoms / history consistent with likely viral URI / sinusitis. Counseled re: supportive home care including saline rinse, steroid nasal spray, expectorant, cough suppressant, and decongestant as needed.   Does have h/o sinus / URI symptoms multiple times per year, last in October; has seen Allergy in the past as well. Recommended resume regular home treatment for allergies with antihistamine, astelin, fluticasone nasal spray.    Due for primary care follow up; will add metabolic panel and A1c to her lab appt planned with Gyn as she is concerned about diabetes. Recommended also schedule for next available appt with PCP.    Vinay Oglesby MD

## 2018-12-12 NOTE — PROGRESS NOTES
Subjective:       Patient ID: Sarina Ann is a 28 y.o. female.    Chief Complaint: URI and Cough    Ms. Ann is a 28 year old  or black female with a history of allergies, elevated IgE, allergic rhinosinusitis and morbid obesity. Recently, on 12/2 developed a stomach virus with sx of vomiting and diarrhea for 3 days. 12/7 developed ear ache and sore throat, developed a cough the next day, unproductive in nature. On 12/9 had headache, chest tightness and chest pain and difficulty with deeper breathing and developed a fever on Sunday as well with fever to 102. Last fever was on Monday. Denies chills or rigors. She has been using flonase, theraflu and mucinex at home. These are giving her some benefit. No sick contacts at home but as noted [place] with significant exposure. She lives with her mother. She works at the airport handling tickets and baggage. She does not wear gloves or a mask at work.     LMP 9/2018 being worked up by Gyn for other causes. Not pregnant per multiple bHCGs which were negative.       Review of Systems   Constitutional: Positive for fatigue. Negative for appetite change, chills and fever.   HENT: Positive for ear pain, postnasal drip and sinus pressure. Negative for rhinorrhea, sneezing, sore throat and trouble swallowing.    Eyes: Negative for discharge and visual disturbance.   Respiratory: Positive for cough, chest tightness and shortness of breath. Negative for wheezing.    Cardiovascular: Positive for chest pain. Negative for palpitations and leg swelling.   Gastrointestinal: Negative for constipation, diarrhea, nausea, rectal pain and vomiting.   Genitourinary: Negative for difficulty urinating and dysuria.   Musculoskeletal: Negative.    Skin: Negative.    Neurological: Negative.    Hematological: Negative.    Psychiatric/Behavioral: Negative.        Objective:      Physical Exam   Constitutional: She is oriented to person, place, and time. She  appears well-developed and well-nourished.  Non-toxic appearance. She does not have a sickly appearance. She does not appear ill. No distress.   obesity   HENT:   Head: Normocephalic and atraumatic.   Right Ear: Tympanic membrane, external ear and ear canal normal.   Left Ear: Tympanic membrane, external ear and ear canal normal.   Nose: Mucosal edema and sinus tenderness present. Left sinus exhibits frontal sinus tenderness.   Eyes: Conjunctivae and EOM are normal. Pupils are equal, round, and reactive to light.   Neck: No JVD present.   Cardiovascular: Normal rate, regular rhythm, S1 normal, S2 normal, normal heart sounds and intact distal pulses.   No murmur heard.  Pulses:       Radial pulses are 2+ on the right side, and 2+ on the left side.   Pulmonary/Chest: Effort normal and breath sounds normal. She has no wheezes. She has no rales.   Abdominal: Soft. Bowel sounds are normal. She exhibits no distension and no mass. There is no tenderness.   Musculoskeletal: Normal range of motion. She exhibits no edema.   Lymphadenopathy:        Head (right side): No submental, no submandibular, no tonsillar, no preauricular, no posterior auricular and no occipital adenopathy present.        Head (left side): No submental, no submandibular, no tonsillar, no preauricular, no posterior auricular and no occipital adenopathy present.     She has no cervical adenopathy.   Neurological: She is alert and oriented to person, place, and time. She has normal strength. No cranial nerve deficit.   Skin: Skin is warm and dry.   Psychiatric: She has a normal mood and affect. Her behavior is normal.   Nursing note and vitals reviewed.      Assessment:       1. Acute rhinosinusitis    2. Cough    3. Obesity (BMI 35.0-39.9 without comorbidity)        Plan:       Sarina was seen today for uri and cough.    Diagnoses and all orders for this visit:    Acute rhinosinusitis   - as patient improving and no long with febrile episode >48 hours  will treat symptomatically   - instructions provided in sign out, will use saline, flonase, zyrtec, mucinex and sudafed PRN, also provided tessalon perles      Cough   - as above and tessalon perles    Obesity (BMI 35.0-39.9 without comorbidity)   - being worked up for missed menstrual cycle, LMP 9/2018   - A1c and CMP once      Patient's case discussed with Dr. Oglesby. Return instructions provided.     Bandar Flores M.D. PGY-2  [facility]  10:41 AM  12/12/2018  Pager [phone]

## 2018-12-13 ENCOUNTER — TELEPHONE (OUTPATIENT)
Dept: UROGYNECOLOGY | Facility: CLINIC | Age: 28
End: 2018-12-13

## 2018-12-13 DIAGNOSIS — N92.6 MISSED MENSES: Primary | ICD-10-CM

## 2018-12-14 ENCOUNTER — LAB VISIT (OUTPATIENT)
Dept: LAB | Facility: OTHER | Age: 28
End: 2018-12-14
Payer: MEDICAID

## 2018-12-14 DIAGNOSIS — N92.6 MISSED MENSES: ICD-10-CM

## 2018-12-14 PROCEDURE — 82672 ASSAY OF ESTROGEN: CPT

## 2018-12-14 PROCEDURE — 36415 COLL VENOUS BLD VENIPUNCTURE: CPT

## 2018-12-14 PROCEDURE — 82679 ASSAY OF ESTRONE: CPT

## 2018-12-18 LAB
ESTRADIOL SERPL-MCNC: 167 PG/ML
ESTROGEN SERPL-MCNC: 472 PG/ML
ESTRONE SERPL-MCNC: 132 PG/ML

## 2019-02-18 ENCOUNTER — TELEPHONE (OUTPATIENT)
Dept: UROGYNECOLOGY | Facility: CLINIC | Age: 29
End: 2019-02-18

## 2019-02-18 DIAGNOSIS — B00.9 HERPES: Primary | ICD-10-CM

## 2019-02-18 RX ORDER — VALACYCLOVIR HYDROCHLORIDE 500 MG/1
500 TABLET, FILM COATED ORAL 2 TIMES DAILY
Qty: 6 TABLET | Refills: 2 | Status: SHIPPED | OUTPATIENT
Start: 2019-02-18 | End: 2020-01-10 | Stop reason: SDUPTHER

## 2019-02-18 NOTE — TELEPHONE ENCOUNTER
----- Message from Dmitry Berry sent at 2/18/2019  4:24 PM CST -----  Pt returning your call [phone]

## 2019-03-09 ENCOUNTER — HOSPITAL ENCOUNTER (EMERGENCY)
Facility: OTHER | Age: 29
Discharge: HOME | End: 2019-03-09
Attending: EMERGENCY MEDICINE
Payer: MEDICAID

## 2019-03-09 VITALS
DIASTOLIC BLOOD PRESSURE: 69 MMHG | SYSTOLIC BLOOD PRESSURE: 114 MMHG | WEIGHT: 228 LBS | HEIGHT: 66 IN | BODY MASS INDEX: 36.64 KG/M2 | TEMPERATURE: 99 F | RESPIRATION RATE: 18 BRPM | HEART RATE: 78 BPM | OXYGEN SATURATION: 100 %

## 2019-03-09 DIAGNOSIS — J02.0 STREPTOCOCCAL PHARYNGITIS: Primary | ICD-10-CM

## 2019-03-09 LAB
B-HCG UR QL: NEGATIVE
CTP QC/QA: YES
DEPRECATED S PYO AG THROAT QL EIA: POSITIVE

## 2019-03-09 PROCEDURE — 99284 EMERGENCY DEPT VISIT MOD MDM: CPT | Mod: 25

## 2019-03-09 PROCEDURE — 63600175 PHARM REV CODE 636 W HCPCS: Performed by: EMERGENCY MEDICINE

## 2019-03-09 PROCEDURE — 96372 THER/PROPH/DIAG INJ SC/IM: CPT

## 2019-03-09 PROCEDURE — 25000003 PHARM REV CODE 250: Performed by: EMERGENCY MEDICINE

## 2019-03-09 PROCEDURE — 81025 URINE PREGNANCY TEST: CPT | Performed by: EMERGENCY MEDICINE

## 2019-03-09 PROCEDURE — 87880 STREP A ASSAY W/OPTIC: CPT

## 2019-03-09 RX ORDER — IBUPROFEN 400 MG/1
800 TABLET ORAL
Status: COMPLETED | OUTPATIENT
Start: 2019-03-09 | End: 2019-03-09

## 2019-03-09 RX ORDER — DEXAMETHASONE SODIUM PHOSPHATE 4 MG/ML
8 INJECTION, SOLUTION INTRA-ARTICULAR; INTRALESIONAL; INTRAMUSCULAR; INTRAVENOUS; SOFT TISSUE
Status: COMPLETED | OUTPATIENT
Start: 2019-03-09 | End: 2019-03-09

## 2019-03-09 RX ORDER — IBUPROFEN 600 MG/1
600 TABLET ORAL EVERY 6 HOURS PRN
Qty: 20 TABLET | Refills: 0 | OUTPATIENT
Start: 2019-03-09 | End: 2019-10-01

## 2019-03-09 RX ADMIN — DEXAMETHASONE SODIUM PHOSPHATE 8 MG: 4 INJECTION, SOLUTION INTRAMUSCULAR; INTRAVENOUS at 08:03

## 2019-03-09 RX ADMIN — PENICILLIN G BENZATHINE 1.2 MILLION UNITS: 1200000 INJECTION, SUSPENSION INTRAMUSCULAR at 09:03

## 2019-03-09 RX ADMIN — IBUPROFEN 800 MG: 400 TABLET, FILM COATED ORAL at 08:03

## 2019-03-09 NOTE — ED NOTES
Two patient identifiers have been checked and are correct.      Appearance: Pt awake, alert & oriented to person, place & time. Pt in no acute distress at present time. Pt is clean and well groomed with clothes appropriately fastened. + generalized sore throat, no resp distress, throat swelling noted, NO otrouble clearing oral secretions.   Skin: Skin warm, dry & intact. Color consistent with ethnicity. Mucous membranes moist. No breakdown or brusing noted.   Musculoskeletal: Patient moving all extremities well, no obvious swelling or deformities noted.   Respiratory: Respirations spontaneous, even, and non-labored. Visible chest rise noted. Airway is open and patent. No accessory muscle use noted.   Neurologic: Sensation is intact. Speech is clear and appropriate. Eyes open spontaneously, behavior appropriate to situation, follows commands, facial expression symmetrical, bilateral hand grasp equal and even, purposeful motor response noted.  Cardiac: All peripheral pulses present. No Bilateral lower extremity edema. Cap refill is <3 seconds.

## 2019-03-09 NOTE — ED PROVIDER NOTES
Encounter Date: 3/9/2019    SCRIBE #1 NOTE: IFarnaz, am scribing for, and in the presence of, Dr. Phipps.       History     Chief Complaint   Patient presents with    Sore Throat     + new onset of generalzied sore throat starting yesterday. + chills reported last PM while sleeping. Denies resp distress, no trouble toelrating oral secretions.      Time seen by provider: 8:24 AM    This is a 28 y.o. female who presents to the ED with complaint of sore throat that began yesterday morning. She reports chills, ear pain, sneezing, and trouble swallowing. She saira fever, congestion, runny nose, cough, shortness of breath, chest pain, abdominal pain, nausea, vomiting, diarrhea, or urinary symptoms. She had no relief with Theraflu, mucinex, tylenol, or chloraseptic spray. She states she takes daily Mucinex as she suffers with allergies. She denies any long term medical history. She denies possible change of being pregnant. She denies consumption of alcohol, use of tobacco, or illicit drugs.        The history is provided by the patient.     Review of patient's allergies indicates:   Allergen Reactions    Ceftriaxone Edema     Past Medical History:   Diagnosis Date    Allergy     Recurrent upper respiratory infection (URI)      Past Surgical History:   Procedure Laterality Date    NO PAST SURGERIES       Family History   Problem Relation Age of Onset    Colon cancer Father     Cancer Father 46        colon ca    Diabetes Mother     Hypertension Mother     Breast cancer Neg Hx     Eclampsia Neg Hx     Miscarriages / Stillbirths Neg Hx     Ovarian cancer Neg Hx      labor Neg Hx     Stroke Neg Hx     Cervical cancer Neg Hx     Vaginal cancer Neg Hx     Endometrial cancer Neg Hx      Social History     Tobacco Use    Smoking status: Never Smoker    Smokeless tobacco: Never Used   Substance Use Topics    Alcohol use: Yes     Comment: social    Drug use: No     Review of Systems    Constitutional: Positive for chills. Negative for fever.   HENT: Positive for ear pain, sneezing, sore throat and trouble swallowing. Negative for congestion and rhinorrhea.    Respiratory: Negative for cough and shortness of breath.    Cardiovascular: Negative for chest pain.   Gastrointestinal: Negative for abdominal pain, diarrhea, nausea and vomiting.   Endocrine: Negative for polyuria.   Genitourinary: Negative for decreased urine volume, difficulty urinating, dysuria, enuresis, hematuria and urgency.   Musculoskeletal: Negative for back pain and neck pain.   Skin: Negative for rash.   Neurological: Negative for weakness.   Hematological: Does not bruise/bleed easily.       Physical Exam     Initial Vitals [03/09/19 0748]   BP Pulse Resp Temp SpO2   121/68 97 18 98.5 °F (36.9 °C) 95 %      MAP       --         Physical Exam    Nursing note and vitals reviewed.  Constitutional: She appears well-developed and well-nourished. She is not diaphoretic. No distress.   Maintaining oral secretions easily, normal voice.   HENT:   Head: Normocephalic and atraumatic.   Enlarged tonsils with posterior oropharyngeal erythema with swelling greater on the right anterior tonsillar pillar than the left. Uvula midline. Slightly diminished light reflex in left TM with no erythema. Right TM with visible blood vessel on surface of TM with no erythema or bulging.   Eyes: Conjunctivae and EOM are normal. No scleral icterus.   Neck: Normal range of motion. Neck supple.   Anterior cervical lymphadenopathy.   Cardiovascular: Normal rate, regular rhythm and normal heart sounds. Exam reveals no gallop and no friction rub.    No murmur heard.  Pulmonary/Chest: Breath sounds normal. No respiratory distress. She has no wheezes. She has no rhonchi. She has no rales.   Abdominal: Soft. Bowel sounds are normal. She exhibits no distension. There is no tenderness. There is no rebound and no guarding.   Musculoskeletal: Normal range of motion.  She exhibits no edema or tenderness.   Lymphadenopathy:     She has cervical adenopathy.   Neurological: She is alert and oriented to person, place, and time.   Skin: Skin is warm and dry. No rash noted. No erythema. No pallor.   Psychiatric: She has a normal mood and affect. Her behavior is normal. Judgment and thought content normal.         ED Course   Procedures  Labs Reviewed   THROAT SCREEN, RAPID - Abnormal; Notable for the following components:       Result Value    Rapid Strep A Screen Positive (*)     All other components within normal limits   POCT URINE PREGNANCY          Imaging Results    None          Medical Decision Making:   Clinical Tests:   Lab Tests: Ordered and Reviewed  ED Management:  Emergent evaluation a 28-year-old female with complaint of sore throat and chills x1 day.  Vital signs are benign, afebrile.  On exam she has significant pharyngeal erythema, especially the right tonsillar pillar.  Uvula is midline, handling secretions, normal voice.  Given that she is afebrile with a normal voice, no shortness of breath or other symptoms, I do not think this necessitates imaging at this time.  Her strep test is positive. She was treated with Bicillin LA.  I did encourage her that if symptoms worsen to return, and she could possibly develop a peritonsillar abscess.  She is given Decadron in the ED and prescription for ibuprofen to help with pain and fever.  She is encouraged close follow-up with PCP or to return for worsening.            Scribe Attestation:   Scribe #1: I performed the above scribed service and the documentation accurately describes the services I performed. I attest to the accuracy of the note.    Attending Attestation:           Physician Attestation for Scribe:  Physician Attestation Statement for Scribe #1: I, Dr. Phipps, reviewed documentation, as scribed by Farnaz Whitlock in my presence, and it is both accurate and complete.                    Clinical Impression:     1.  Streptococcal pharyngitis                                   Etelvina Phipps MD  03/09/19 1829

## 2019-03-09 NOTE — ED TRIAGE NOTES
27 y/o female present with c/o sore throat, sneezing, no cough, runny nose with thin, clear discharge, bilateral ear pain with popping sounds, dizziness, chills, night sweat since yesterday. [place] Not aware if any recent exposure to any illness or sickness in her environment.

## 2019-03-29 ENCOUNTER — TELEPHONE (OUTPATIENT)
Dept: UROGYNECOLOGY | Facility: CLINIC | Age: 29
End: 2019-03-29

## 2019-03-29 DIAGNOSIS — R35.0 URINARY FREQUENCY: Primary | ICD-10-CM

## 2019-03-29 NOTE — TELEPHONE ENCOUNTER
Returned pt call no answer, Left voice message for pt to give the office a call back at [phone] regarding symptoms of an UTI.

## 2019-03-29 NOTE — TELEPHONE ENCOUNTER
----- Message from Allison Mathis sent at 3/29/2019 10:38 AM CDT -----  Contact: pt  Name of Who is Calling: GUY CHAPPELL [[id number]]      What is the request in detail:pt needs medication for a UTI sent to pharmacy.. Please advise      Can the clinic reply by [facility]: no      What Number to Call Back if not in [facility]: [phone]

## 2019-10-01 ENCOUNTER — HOSPITAL ENCOUNTER (EMERGENCY)
Facility: OTHER | Age: 29
Discharge: HOME | End: 2019-10-01
Attending: EMERGENCY MEDICINE
Payer: MEDICAID

## 2019-10-01 VITALS
SYSTOLIC BLOOD PRESSURE: 115 MMHG | TEMPERATURE: 98 F | RESPIRATION RATE: 16 BRPM | BODY MASS INDEX: 35.79 KG/M2 | HEART RATE: 76 BPM | DIASTOLIC BLOOD PRESSURE: 68 MMHG | OXYGEN SATURATION: 99 % | HEIGHT: 67 IN | WEIGHT: 228 LBS

## 2019-10-01 DIAGNOSIS — T14.90XA TRAUMA: ICD-10-CM

## 2019-10-01 DIAGNOSIS — S96.911A STRAIN OF RIGHT FOOT, INITIAL ENCOUNTER: Primary | ICD-10-CM

## 2019-10-01 DIAGNOSIS — S90.31XA CONTUSION OF RIGHT FOOT, INITIAL ENCOUNTER: ICD-10-CM

## 2019-10-01 PROCEDURE — 99284 EMERGENCY DEPT VISIT MOD MDM: CPT | Mod: 25

## 2019-10-01 RX ORDER — OXYCODONE AND ACETAMINOPHEN 5; 325 MG/1; MG/1
1 TABLET ORAL EVERY 6 HOURS PRN
Qty: 5 TABLET | Refills: 0 | OUTPATIENT
Start: 2019-10-01 | End: 2021-01-25

## 2019-10-01 RX ORDER — IBUPROFEN 600 MG/1
600 TABLET ORAL EVERY 6 HOURS PRN
Qty: 9 TABLET | Refills: 0 | OUTPATIENT
Start: 2019-10-01 | End: 2021-01-25

## 2019-10-01 RX ORDER — IBUPROFEN 400 MG/1
800 TABLET ORAL
Status: DISCONTINUED | OUTPATIENT
Start: 2019-10-01 | End: 2019-10-01 | Stop reason: HOSPADM

## 2019-10-01 NOTE — ED PROVIDER NOTES
Encounter Date: 10/1/2019    SCRIBE #1 NOTE: I, Enmanuel Alicia, am scribing for, and in the presence of, Dr. Lama.       History     Chief Complaint   Patient presents with    Foot Pain     Pt c/o right foot & ankle pain after falling last night while ambulating down stairs. Denies LOC & head injury.     Time seen by provider: 7:49 AM    This is a 28 y.o. female who presents with complaint of gradually worsening right foot pain that began last night after falling down stairs. She denies LOC or hitting her head. The patient reports icing and elevating the foot with no relief. She reports she has to drag the foot while walking due to pain. The patient reports she drove to the ED by herself.    The history is provided by the patient.     Review of patient's allergies indicates:   Allergen Reactions    Ceftriaxone Edema     Past Medical History:   Diagnosis Date    Allergy     Recurrent upper respiratory infection (URI)      Past Surgical History:   Procedure Laterality Date    NO PAST SURGERIES       Family History   Problem Relation Age of Onset    Colon cancer Father     Cancer Father 46        colon ca    Diabetes Mother     Hypertension Mother     Breast cancer Neg Hx     Eclampsia Neg Hx     Miscarriages / Stillbirths Neg Hx     Ovarian cancer Neg Hx      labor Neg Hx     Stroke Neg Hx     Cervical cancer Neg Hx     Vaginal cancer Neg Hx     Endometrial cancer Neg Hx      Social History     Tobacco Use    Smoking status: Never Smoker    Smokeless tobacco: Never Used   Substance Use Topics    Alcohol use: Yes     Comment: social    Drug use: No     Review of Systems   Constitutional: Negative for fever.   HENT: Negative for sore throat.    Respiratory: Negative for shortness of breath.    Cardiovascular: Negative for chest pain.   Gastrointestinal: Negative for nausea.   Genitourinary: Negative for dysuria.   Musculoskeletal: Positive for arthralgias (Right foot.). Negative for back  pain.   Skin: Negative for rash.   Neurological: Negative for weakness.   Hematological: Does not bruise/bleed easily.   All other systems reviewed and are negative.      Physical Exam     Initial Vitals [10/01/19 0719]   BP Pulse Resp Temp SpO2   110/69 74 18 97.9 °F (36.6 °C) 99 %      MAP       --         Physical Exam    Nursing note and vitals reviewed.  Constitutional: She appears well-developed and well-nourished. She is not diaphoretic. No distress.   HENT:   Head: Normocephalic and atraumatic.   Mouth/Throat: Oropharynx is clear and moist.   Eyes: Conjunctivae and EOM are normal. Pupils are equal, round, and reactive to light. No scleral icterus.   Neck: Normal range of motion. Neck supple.   Cardiovascular: Normal rate, regular rhythm, S1 normal, S2 normal and normal heart sounds. Exam reveals no gallop and no friction rub.    No murmur heard.  Pulmonary/Chest: Breath sounds normal. No respiratory distress. She has no wheezes. She has no rhonchi. She has no rales.   Abdominal: Soft. Bowel sounds are normal. There is no tenderness. There is no rebound and no guarding.   Musculoskeletal: She exhibits no edema.   DP/PT pulses 2+. Sensations intact to light touch. Patient has limited movement secondary to pain. Tenderness over the third and four metatarsal. No deformity. No crepitus. No step offs. No tenderness over the medial malleolus. No tenderness over the lateral malleolus. No tenderness over the proximal fifth metatarsal.    Lymphadenopathy:     She has no cervical adenopathy.   Neurological: She is alert and oriented to person, place, and time.   Skin: Skin is warm and dry. Capillary refill takes less than 2 seconds. No rash noted. No pallor.   Psychiatric: She has a normal mood and affect. Her behavior is normal. Judgment and thought content normal.         ED Course   Procedures  Labs Reviewed   POCT URINE PREGNANCY          Imaging Results          X-Ray Foot Complete Right (Final result)  Result  time 10/01/19 08:13:12    Final result by Cooper Cabrera III, MD (10/01/19 08:13:12)                 Narrative:    EXAMINATION:  XR FOOT COMPLETE 3 VIEW RIGHT    CLINICAL HISTORY:  Injury, unspecified, initial encounter    FINDINGS:  Three views: There is a hallux valgus deformity.  No fracture, dislocation, or bone destruction seen.      Electronically signed by: Cooper Cabrera MD  Date:    10/01/2019  Time:    08:13                            X-Rays:   Independently Interpreted Readings:   Other Readings:  Right Foot - No acute findings. No fracture. No dislocation visualized.    Medical Decision Making:   Independently Interpreted Test(s):   I have ordered and independently interpreted X-rays - see prior notes.  Clinical Tests:   Lab Tests: Ordered and Reviewed  Radiological Study: Ordered and Reviewed            Scribe Attestation:   Scribe #1: I performed the above scribed service and the documentation accurately describes the services I performed. I attest to the accuracy of the note.    Attending Attestation:           Physician Attestation for Scribe:  Physician Attestation Statement for Scribe #1: I, Dr. Lama, reviewed documentation, as scribed by Enmanuel Alicia in my presence, and it is both accurate and complete.         Attending ED Notes:   Emergent evaluation of 28-year-old female with right foot pain status post trauma. Patient is neurovascularly intact without focal neurologic deficits.  No deformity, crepitus or step-offs.  X-ray reveals no acute fractures or dislocations.  The patient is extensively counseled her diagnosis and treatment including all diagnostic and physical exam findings.  The patient discharged good condition and directed to follow up with her PCP in the next 24-48 hours.             Clinical Impression:     1. Strain of right foot, initial encounter    2. Trauma    3. Contusion of right foot, initial encounter                                   Mian Lama,  MD  10/01/19 0900

## 2019-10-01 NOTE — ED NOTES
Notified xray pt agrees to shielding and ok per MD. Pt provided warm blanket and water. ibuprophen held for poct per MD

## 2019-10-01 NOTE — ED NOTES
Two patient identifiers have been checked and are correct.      Pt states fell down stairs, stayed upright during fall and only injury is pain to right ankle made worse with movement or palpation. Pt denies any other injury from fall and denies dizziness/lightheadedness leading to fall.    Appearance: Pt awake, alert & oriented to person, place & time. Pt in no acute distress at present time. Pt is clean and well groomed with clothes appropriately fastened.   Skin: Skin warm, dry & intact. Color consistent with ethnicity. Mucous membranes moist. No breakdown or brusing noted.   Musculoskeletal: Patient moving all extremities well, no obvious swelling or deformities noted except right ankle is tender palpation and ROM inhibited by pain. No swelling, redness or deformity noted.   Respiratory: Respirations spontaneous, even, and non-labored. Visible chest rise noted. Airway is open and patent. No accessory muscle use noted.   Neurologic: Sensation is intact. Speech is clear and appropriate. Eyes open spontaneously, behavior appropriate to situation, follows commands, facial expression symmetrical, bilateral hand grasp equal and even, purposeful motor response noted.  Cardiac: All peripheral pulses present. No Bilateral lower extremity edema. Cap refill is <3 seconds.  Abdomen: Abdomen soft, non-tender to palpation.   : Pt reports no dysuria or hematuria.

## 2019-10-31 ENCOUNTER — TELEPHONE (OUTPATIENT)
Dept: UROGYNECOLOGY | Facility: CLINIC | Age: 29
End: 2019-10-31

## 2019-10-31 ENCOUNTER — LAB VISIT (OUTPATIENT)
Dept: LAB | Facility: OTHER | Age: 29
End: 2019-10-31
Attending: NURSE PRACTITIONER
Payer: MEDICAID

## 2019-10-31 DIAGNOSIS — R39.15 URINARY URGENCY: Primary | ICD-10-CM

## 2019-10-31 DIAGNOSIS — R39.15 URINARY URGENCY: ICD-10-CM

## 2019-10-31 DIAGNOSIS — R82.90 CLOUDY URINE: ICD-10-CM

## 2019-10-31 LAB
BACTERIA #/AREA URNS HPF: ABNORMAL /HPF
BILIRUB UR QL STRIP: NEGATIVE
CLARITY UR: ABNORMAL
COLOR UR: YELLOW
GLUCOSE UR QL STRIP: NEGATIVE
HGB UR QL STRIP: ABNORMAL
HYALINE CASTS #/AREA URNS LPF: 1 /LPF
KETONES UR QL STRIP: NEGATIVE
LEUKOCYTE ESTERASE UR QL STRIP: ABNORMAL
MICROSCOPIC COMMENT: ABNORMAL
NITRITE UR QL STRIP: POSITIVE
PH UR STRIP: 8 [PH] (ref 5–8)
PROT UR QL STRIP: ABNORMAL
RBC #/AREA URNS HPF: 30 /HPF (ref 0–4)
SP GR UR STRIP: 1.02 (ref 1–1.03)
SQUAMOUS #/AREA URNS HPF: 1 /HPF
URN SPEC COLLECT METH UR: ABNORMAL
UROBILINOGEN UR STRIP-ACNC: NEGATIVE EU/DL
WBC #/AREA URNS HPF: >100 /HPF (ref 0–5)
WBC CLUMPS URNS QL MICRO: ABNORMAL

## 2019-10-31 PROCEDURE — 87088 URINE BACTERIA CULTURE: CPT

## 2019-10-31 PROCEDURE — 87086 URINE CULTURE/COLONY COUNT: CPT

## 2019-10-31 PROCEDURE — 81000 URINALYSIS NONAUTO W/SCOPE: CPT

## 2019-10-31 PROCEDURE — 87186 SC STD MICRODIL/AGAR DIL: CPT | Mod: 59

## 2019-10-31 PROCEDURE — 87077 CULTURE AEROBIC IDENTIFY: CPT

## 2019-10-31 NOTE — TELEPHONE ENCOUNTER
----- Message from Dmitry Berry sent at 10/31/2019 10:22 AM CDT -----  Please call pt she has a bladder infection [phone]

## 2019-10-31 NOTE — TELEPHONE ENCOUNTER
Spoke with pt who states she is having symptoms of urinary urgency, cloudy and smelly urine but denies any pain. Pt thinks she may have a UTI. Pt will drop off a urine sample today at [facility] lab she is aware it can take up to 72 hours before culture is resulted, she is going to try AZO OTC to help with symptoms until then and contact the office or go to nearest ED with any changes in symptoms, voiced understanding and call was ended.

## 2019-11-03 LAB — BACTERIA UR CULT: ABNORMAL

## 2019-11-04 ENCOUNTER — PATIENT MESSAGE (OUTPATIENT)
Dept: UROGYNECOLOGY | Facility: CLINIC | Age: 29
End: 2019-11-04

## 2019-11-04 ENCOUNTER — TELEPHONE (OUTPATIENT)
Dept: UROGYNECOLOGY | Facility: CLINIC | Age: 29
End: 2019-11-04

## 2019-11-04 DIAGNOSIS — N30.00 ACUTE CYSTITIS WITHOUT HEMATURIA: Primary | ICD-10-CM

## 2019-11-04 RX ORDER — NITROFURANTOIN 25; 75 MG/1; MG/1
100 CAPSULE ORAL 2 TIMES DAILY
Qty: 14 CAPSULE | Refills: 0 | OUTPATIENT
Start: 2019-11-04 | End: 2021-01-25

## 2019-11-04 NOTE — TELEPHONE ENCOUNTER
----- Message from Tiffany Mejia sent at 11/4/2019 10:23 AM CST -----  Contact: GUY CHAPPELL [[id number]]  Name of Who is Calling: GUY CHAPPELL [[id number]]     What is the request in detail:GUY CHAPPELL [[id number]] is requesting Anti Biotics be sent over to Pharmacy ...[id number] - [place], [place] - [address] AT [facility]  Please contact to further discuss and advise      Can the clinic reply by [facility]: No     What Number to Call Back if not [facility]:  [phone] (home)

## 2019-12-28 ENCOUNTER — HOSPITAL ENCOUNTER (EMERGENCY)
Facility: OTHER | Age: 29
Discharge: HOME | End: 2019-12-28
Attending: EMERGENCY MEDICINE
Payer: MEDICAID

## 2019-12-28 VITALS
DIASTOLIC BLOOD PRESSURE: 95 MMHG | WEIGHT: 230 LBS | TEMPERATURE: 98 F | BODY MASS INDEX: 36.1 KG/M2 | HEIGHT: 67 IN | OXYGEN SATURATION: 100 % | SYSTOLIC BLOOD PRESSURE: 141 MMHG | RESPIRATION RATE: 18 BRPM | HEART RATE: 90 BPM

## 2019-12-28 DIAGNOSIS — H66.001 ACUTE SUPPURATIVE OTITIS MEDIA OF RIGHT EAR WITHOUT SPONTANEOUS RUPTURE OF TYMPANIC MEMBRANE, RECURRENCE NOT SPECIFIED: Primary | ICD-10-CM

## 2019-12-28 LAB
B-HCG UR QL: NEGATIVE
CTP QC/QA: YES

## 2019-12-28 PROCEDURE — 99283 EMERGENCY DEPT VISIT LOW MDM: CPT

## 2019-12-28 PROCEDURE — 81025 URINE PREGNANCY TEST: CPT | Performed by: EMERGENCY MEDICINE

## 2019-12-28 RX ORDER — FLUTICASONE PROPIONATE 50 MCG
1 SPRAY, SUSPENSION (ML) NASAL 2 TIMES DAILY PRN
Qty: 15 G | Refills: 0 | Status: SHIPPED | OUTPATIENT
Start: 2019-12-28

## 2019-12-28 RX ORDER — AZITHROMYCIN 250 MG/1
250 TABLET, FILM COATED ORAL DAILY
Qty: 6 TABLET | Refills: 0 | Status: SHIPPED | OUTPATIENT
Start: 2019-12-28

## 2019-12-29 NOTE — ED TRIAGE NOTES
Pt reports to Ed with c/o ear fullness since Christmas eve. Pt reports fever of 103.0 yesterday. Pt reports cough, sore throat, and HA. Pt states she is taking Theraflu and mucinex at home with relief.

## 2019-12-29 NOTE — ED PROVIDER NOTES
Encounter Date: 2019       History     Chief Complaint   Patient presents with    Ear Fullness     +Pt reporting bilateral ear fullness with sore throat, H/A x5 days. Denies OTC medication use.      Patient is 29-year-old female no significant past medical history presents with complaints of bilateral ear pain left greater than right and nasal congestion for 4 days prior to arrival.  She reports fever of 103 yesterday.  She reports taking anti-inflammatories with only temporary relief.  No sick contacts or recent travel.  No loss of hearing.  Currently unaccompanied in the ER.        Review of patient's allergies indicates:   Allergen Reactions    Ceftriaxone Edema     Past Medical History:   Diagnosis Date    Allergy     Recurrent upper respiratory infection (URI)      Past Surgical History:   Procedure Laterality Date    NO PAST SURGERIES       Family History   Problem Relation Age of Onset    Colon cancer Father     Cancer Father 46        colon ca    Diabetes Mother     Hypertension Mother     Breast cancer Neg Hx     Eclampsia Neg Hx     Miscarriages / Stillbirths Neg Hx     Ovarian cancer Neg Hx      labor Neg Hx     Stroke Neg Hx     Cervical cancer Neg Hx     Vaginal cancer Neg Hx     Endometrial cancer Neg Hx      Social History     Tobacco Use    Smoking status: Never Smoker    Smokeless tobacco: Never Used   Substance Use Topics    Alcohol use: Yes     Comment: social    Drug use: No     Review of Systems   Constitutional: Positive for fever.   HENT: Positive for congestion and ear pain. Negative for sore throat.    Respiratory: Negative for shortness of breath.    Cardiovascular: Negative for chest pain.   Gastrointestinal: Negative for nausea.   Genitourinary: Negative for dysuria.   Musculoskeletal: Negative for back pain.   Skin: Negative for rash.   Neurological: Negative for weakness.   Hematological: Does not bruise/bleed easily.       Physical Exam     Initial  Vitals [12/28/19 1735]   BP Pulse Resp Temp SpO2   (!) 141/95 90 18 98.4 °F (36.9 °C) 100 %      MAP       --         Physical Exam    Nursing note and vitals reviewed.  Constitutional: She appears well-developed and well-nourished. She is not diaphoretic. No distress.   Healthy-appearing female in no acute distress or apparent pain.  She makes good eye contact, speaks in clear full sentences and ambulates with ease.   HENT:   Head: Normocephalic and atraumatic.   Right tympanic membrane has dull appearance with no light reflex.  No erythema.  There is bulging.  There is no purulence or perforation.  Left tympanic membrane has retraction with evidence of middle ear effusion.  No perforation.  No associated tragus or mastoid tenderness bilaterally.    Nasal turbinates erythematous and edematous bilaterally.    Posterior oropharynx cobblestoning without edema or exudate.   Eyes: Conjunctivae and EOM are normal. Pupils are equal, round, and reactive to light.   Neck: Normal range of motion.   Cardiovascular: Normal rate, regular rhythm, normal heart sounds and intact distal pulses. Exam reveals no gallop and no friction rub.    No murmur heard.  Pulmonary/Chest: Breath sounds normal. She has no wheezes. She has no rhonchi. She has no rales.   Clear lungs auscultation bilaterally   Abdominal: Soft. Bowel sounds are normal. There is no tenderness. There is no rebound and no guarding.   Musculoskeletal: Normal range of motion. She exhibits no edema or tenderness.   Lymphadenopathy:     She has no cervical adenopathy.   Neurological: She is alert and oriented to person, place, and time. She has normal strength. No cranial nerve deficit or sensory deficit. GCS score is 15. GCS eye subscore is 4. GCS verbal subscore is 5. GCS motor subscore is 6.   Skin: Skin is warm. Capillary refill takes less than 2 seconds. No rash and no abscess noted. No erythema.   Psychiatric: She has a normal mood and affect. Her behavior is normal.  Thought content normal.         ED Course   Procedures  Labs Reviewed   POCT URINE PREGNANCY          Imaging Results    None          Medical Decision Making:   ED Management:  Urgent evaluation a 29-year-old female who presents with complaints most consistent with acute otitis media.  She is afebrile, nontoxic appearing, hemodynamically stable. Physical exam outlined above and reveals evidence of acute otitis media without spontaneous rupture.  No systemic sequelae.  Will cover with azithromycin given her penicillin allergy.  Will discharge with Flonase and encouragement to follow up with primary care provider in the outpatient setting within 1-2 days.  She is educated on ED return precautions verbalized understanding.  She is stable for discharge.                                 Clinical Impression:       ICD-10-CM ICD-9-CM   1. Acute suppurative otitis media of right ear without spontaneous rupture of tympanic membrane, recurrence not specified H66.001 382.00                             Elisa Oliveira PA-C  12/28/19 1811

## 2020-01-10 DIAGNOSIS — B00.9 HERPES: ICD-10-CM

## 2020-01-10 RX ORDER — VALACYCLOVIR HYDROCHLORIDE 500 MG/1
500 TABLET, FILM COATED ORAL 2 TIMES DAILY
Qty: 6 TABLET | Refills: 2 | Status: SHIPPED | OUTPATIENT
Start: 2020-01-10 | End: 2020-01-13

## 2020-01-25 ENCOUNTER — HOSPITAL ENCOUNTER (EMERGENCY)
Facility: OTHER | Age: 30
Discharge: HOME | End: 2020-01-25
Attending: EMERGENCY MEDICINE
Payer: MEDICAID

## 2020-01-25 VITALS
HEART RATE: 107 BPM | SYSTOLIC BLOOD PRESSURE: 117 MMHG | OXYGEN SATURATION: 100 % | HEIGHT: 67 IN | RESPIRATION RATE: 18 BRPM | DIASTOLIC BLOOD PRESSURE: 70 MMHG | BODY MASS INDEX: 37.04 KG/M2 | TEMPERATURE: 100 F | WEIGHT: 236 LBS

## 2020-01-25 DIAGNOSIS — R68.89 FLU-LIKE SYMPTOMS: ICD-10-CM

## 2020-01-25 DIAGNOSIS — B34.9 ACUTE VIRAL SYNDROME: Primary | ICD-10-CM

## 2020-01-25 LAB
B-HCG UR QL: NEGATIVE
CTP QC/QA: YES
CTP QC/QA: YES
POC MOLECULAR INFLUENZA A AGN: NEGATIVE
POC MOLECULAR INFLUENZA B AGN: NEGATIVE

## 2020-01-25 PROCEDURE — 99284 EMERGENCY DEPT VISIT MOD MDM: CPT | Mod: 25

## 2020-01-25 PROCEDURE — 81025 URINE PREGNANCY TEST: CPT | Performed by: EMERGENCY MEDICINE

## 2020-01-25 PROCEDURE — 25000003 PHARM REV CODE 250: Performed by: PHYSICIAN ASSISTANT

## 2020-01-25 RX ORDER — ALBUTEROL SULFATE 90 UG/1
1-2 AEROSOL, METERED RESPIRATORY (INHALATION) EVERY 6 HOURS PRN
Qty: 6.7 G | Refills: 0 | Status: SHIPPED | OUTPATIENT
Start: 2020-01-25

## 2020-01-25 RX ORDER — ONDANSETRON 4 MG/1
4 TABLET, ORALLY DISINTEGRATING ORAL EVERY 6 HOURS PRN
Qty: 12 TABLET | Refills: 0 | OUTPATIENT
Start: 2020-01-25 | End: 2020-06-23

## 2020-01-25 RX ORDER — ONDANSETRON 4 MG/1
4 TABLET, ORALLY DISINTEGRATING ORAL
Status: COMPLETED | OUTPATIENT
Start: 2020-01-25 | End: 2020-01-25

## 2020-01-25 RX ORDER — BENZONATATE 100 MG/1
100 CAPSULE ORAL 3 TIMES DAILY PRN
Qty: 12 CAPSULE | Refills: 0 | Status: SHIPPED | OUTPATIENT
Start: 2020-01-25 | End: 2020-02-04

## 2020-01-25 RX ORDER — ACETAMINOPHEN 500 MG
1000 TABLET ORAL
Status: COMPLETED | OUTPATIENT
Start: 2020-01-25 | End: 2020-01-25

## 2020-01-25 RX ORDER — IBUPROFEN 600 MG/1
600 TABLET ORAL
Status: COMPLETED | OUTPATIENT
Start: 2020-01-25 | End: 2020-01-25

## 2020-01-25 RX ADMIN — ACETAMINOPHEN 1000 MG: 500 TABLET ORAL at 05:01

## 2020-01-25 RX ADMIN — ONDANSETRON 4 MG: 4 TABLET, ORALLY DISINTEGRATING ORAL at 05:01

## 2020-01-25 RX ADMIN — IBUPROFEN 600 MG: 600 TABLET, FILM COATED ORAL at 05:01

## 2020-01-25 NOTE — ED TRIAGE NOTES
"Patient presents to ER with c/o body aches, cough, chills and intermittent nausea that started today.  Patient also reports episodes of sob with exertion.  Patient reports taking Thera flu, "nasal sprays", and Mucinex with no relieve.  Patient denies chest pain.  "

## 2020-01-26 NOTE — ED PROVIDER NOTES
Encounter Date: 2020       History     Chief Complaint   Patient presents with    Flu Like Symptoms     Pt woke up this AM with chills, body aches, nausea. Dry cough. has been taking Mucinex and Theraflu     Patient is a 29-year-old female with n history of bronchitis who presents to the emergency department with flu-like symptoms. Patient states she woke up this morning with general malaise, body aches, fever, and a cough.  She states she become short of breath when she exerts herself and when she coughs.  She reports T-max of a 100° at home.  She reports taking Tylenol and Motrin for her symptoms. She also reports nausea but denies emesis or diarrhea.  She reports decreased appetite.    The history is provided by the patient.     Review of patient's allergies indicates:   Allergen Reactions    Ceftriaxone Edema     Past Medical History:   Diagnosis Date    Allergy     Recurrent upper respiratory infection (URI)      Past Surgical History:   Procedure Laterality Date    NO PAST SURGERIES       Family History   Problem Relation Age of Onset    Colon cancer Father     Cancer Father 46        colon ca    Diabetes Mother     Hypertension Mother     Breast cancer Neg Hx     Eclampsia Neg Hx     Miscarriages / Stillbirths Neg Hx     Ovarian cancer Neg Hx      labor Neg Hx     Stroke Neg Hx     Cervical cancer Neg Hx     Vaginal cancer Neg Hx     Endometrial cancer Neg Hx      Social History     Tobacco Use    Smoking status: Never Smoker    Smokeless tobacco: Never Used   Substance Use Topics    Alcohol use: Yes     Comment: social    Drug use: No     Review of Systems   Constitutional: Positive for chills, fatigue and fever.   HENT: Positive for congestion. Negative for sore throat.    Eyes: Negative for visual disturbance.   Respiratory: Positive for cough. Negative for shortness of breath.    Cardiovascular: Negative for chest pain.   Gastrointestinal: Positive for nausea. Negative  for abdominal pain, diarrhea and vomiting.   Genitourinary: Negative for difficulty urinating.   Musculoskeletal: Positive for myalgias.   Skin: Negative for rash.   Allergic/Immunologic: Negative for immunocompromised state.   Neurological: Negative for headaches.       Physical Exam     Initial Vitals [01/25/20 1651]   BP Pulse Resp Temp SpO2   117/70 (!) 116 18 100.1 °F (37.8 °C) 97 %      MAP       --         Physical Exam    Vitals reviewed.  Constitutional: She is cooperative. No distress.   HENT:   Head: Normocephalic and atraumatic.   Eyes: Conjunctivae and EOM are normal.   Neck: Normal range of motion. Neck supple.   Cardiovascular: Regular rhythm and intact distal pulses. Tachycardia present.    Pulmonary/Chest: Breath sounds normal. No respiratory distress. She has no wheezes. She has no rhonchi. She has no rales.   Abdominal: Soft. Bowel sounds are normal. There is no tenderness.   Neurological: She is alert and oriented to person, place, and time. GCS eye subscore is 4. GCS verbal subscore is 5. GCS motor subscore is 6.   Skin: Skin is warm and dry. No rash noted.         ED Course   Procedures  Labs Reviewed   POCT URINE PREGNANCY   POCT INFLUENZA A/B MOLECULAR          Imaging Results    None          Medical Decision Making:   Initial Assessment:   Urgent evaluation of a 29 y.o. female presenting to the emergency department complaining of flu-like symptoms since this morning.  Patient is afebrile but does have an elevated temperature.  She is nontoxic-appearing hemodynamically stable.  She is concern for possible bronchitis infection.  She is not hypoxic or tachypneic.  Lungs are clear auscultation bilaterally.    ED Management:  The patient's symptoms are consistent with an influenza-like illness.  I do not suspect pneumonia and do not believe chest x-ray is indicated.  I do not think Tamiflu is necessary as the patient is not immunocompromised, elderly or young child, or pregnant.  Patient  otherwise does not appear to be toxic and I do not suspect sepsis.  I do not feel a further work up is necessary at this time.                                    Clinical Impression:     1. Acute viral syndrome    2. Flu-like symptoms                            René Conte PA-C  01/25/20 3513

## 2020-06-23 ENCOUNTER — HOSPITAL ENCOUNTER (EMERGENCY)
Facility: OTHER | Age: 30
Discharge: HOME | End: 2020-06-23
Attending: EMERGENCY MEDICINE
Payer: MEDICAID

## 2020-06-23 VITALS
SYSTOLIC BLOOD PRESSURE: 109 MMHG | BODY MASS INDEX: 37.67 KG/M2 | DIASTOLIC BLOOD PRESSURE: 66 MMHG | WEIGHT: 240 LBS | TEMPERATURE: 99 F | RESPIRATION RATE: 20 BRPM | OXYGEN SATURATION: 99 % | HEIGHT: 67 IN | HEART RATE: 87 BPM

## 2020-06-23 DIAGNOSIS — R19.7 NAUSEA, VOMITING, AND DIARRHEA: Primary | ICD-10-CM

## 2020-06-23 DIAGNOSIS — R10.9 ABDOMINAL PAIN, UNSPECIFIED ABDOMINAL LOCATION: ICD-10-CM

## 2020-06-23 DIAGNOSIS — R11.2 NAUSEA, VOMITING, AND DIARRHEA: Primary | ICD-10-CM

## 2020-06-23 LAB
ALBUMIN SERPL BCP-MCNC: 4 G/DL (ref 3.5–5.2)
ALP SERPL-CCNC: 73 U/L (ref 55–135)
ALT SERPL W/O P-5'-P-CCNC: 15 U/L (ref 10–44)
ANION GAP SERPL CALC-SCNC: 12 MMOL/L (ref 8–16)
AST SERPL-CCNC: 18 U/L (ref 10–40)
B-HCG UR QL: NEGATIVE
BACTERIA #/AREA URNS HPF: ABNORMAL /HPF
BACTERIA #/AREA URNS HPF: ABNORMAL /HPF
BASOPHILS # BLD AUTO: 0.01 K/UL (ref 0–0.2)
BASOPHILS NFR BLD: 0.1 % (ref 0–1.9)
BILIRUB SERPL-MCNC: 0.6 MG/DL (ref 0.1–1)
BILIRUB UR QL STRIP: ABNORMAL
BILIRUB UR QL STRIP: NEGATIVE
BUN SERPL-MCNC: 9 MG/DL (ref 6–20)
CALCIUM SERPL-MCNC: 9.9 MG/DL (ref 8.7–10.5)
CHLORIDE SERPL-SCNC: 106 MMOL/L (ref 95–110)
CLARITY UR: ABNORMAL
CLARITY UR: CLEAR
CO2 SERPL-SCNC: 18 MMOL/L (ref 23–29)
COLOR UR: YELLOW
COLOR UR: YELLOW
CREAT SERPL-MCNC: 1 MG/DL (ref 0.5–1.4)
CTP QC/QA: YES
DIFFERENTIAL METHOD: ABNORMAL
EOSINOPHIL # BLD AUTO: 0 K/UL (ref 0–0.5)
EOSINOPHIL NFR BLD: 0.1 % (ref 0–8)
ERYTHROCYTE [DISTWIDTH] IN BLOOD BY AUTOMATED COUNT: 13.2 % (ref 11.5–14.5)
EST. GFR  (AFRICAN AMERICAN): >60 ML/MIN/1.73 M^2
EST. GFR  (NON AFRICAN AMERICAN): >60 ML/MIN/1.73 M^2
GLUCOSE SERPL-MCNC: 93 MG/DL (ref 70–110)
GLUCOSE UR QL STRIP: NEGATIVE
GLUCOSE UR QL STRIP: NEGATIVE
HCT VFR BLD AUTO: 41.4 % (ref 37–48.5)
HGB BLD-MCNC: 13 G/DL (ref 12–16)
HGB UR QL STRIP: ABNORMAL
HGB UR QL STRIP: NEGATIVE
IMM GRANULOCYTES # BLD AUTO: 0.02 K/UL (ref 0–0.04)
IMM GRANULOCYTES NFR BLD AUTO: 0.2 % (ref 0–0.5)
KETONES UR QL STRIP: NEGATIVE
KETONES UR QL STRIP: NEGATIVE
LEUKOCYTE ESTERASE UR QL STRIP: ABNORMAL
LEUKOCYTE ESTERASE UR QL STRIP: ABNORMAL
LIPASE SERPL-CCNC: 12 U/L (ref 4–60)
LYMPHOCYTES # BLD AUTO: 1.1 K/UL (ref 1–4.8)
LYMPHOCYTES NFR BLD: 9.7 % (ref 18–48)
MCH RBC QN AUTO: 29.7 PG (ref 27–31)
MCHC RBC AUTO-ENTMCNC: 31.4 G/DL (ref 32–36)
MCV RBC AUTO: 95 FL (ref 82–98)
MICROSCOPIC COMMENT: ABNORMAL
MICROSCOPIC COMMENT: ABNORMAL
MONOCYTES # BLD AUTO: 0.6 K/UL (ref 0.3–1)
MONOCYTES NFR BLD: 5.5 % (ref 4–15)
NEUTROPHILS # BLD AUTO: 9.2 K/UL (ref 1.8–7.7)
NEUTROPHILS NFR BLD: 84.4 % (ref 38–73)
NITRITE UR QL STRIP: NEGATIVE
NITRITE UR QL STRIP: NEGATIVE
NRBC BLD-RTO: 0 /100 WBC
PH UR STRIP: 6 [PH] (ref 5–8)
PH UR STRIP: 6 [PH] (ref 5–8)
PLATELET # BLD AUTO: 326 K/UL (ref 150–350)
PMV BLD AUTO: 9.6 FL (ref 9.2–12.9)
POTASSIUM SERPL-SCNC: 4.2 MMOL/L (ref 3.5–5.1)
PROT SERPL-MCNC: 8.4 G/DL (ref 6–8.4)
PROT UR QL STRIP: ABNORMAL
PROT UR QL STRIP: NEGATIVE
RBC # BLD AUTO: 4.37 M/UL (ref 4–5.4)
RBC #/AREA URNS HPF: 0 /HPF (ref 0–4)
RBC #/AREA URNS HPF: 10 /HPF (ref 0–4)
SODIUM SERPL-SCNC: 136 MMOL/L (ref 136–145)
SP GR UR STRIP: 1.02 (ref 1–1.03)
SP GR UR STRIP: >=1.03 (ref 1–1.03)
SQUAMOUS #/AREA URNS HPF: >100 /HPF
TRICHOMONAS UR QL MICRO: ABNORMAL
URN SPEC COLLECT METH UR: ABNORMAL
URN SPEC COLLECT METH UR: ABNORMAL
UROBILINOGEN UR STRIP-ACNC: NEGATIVE EU/DL
UROBILINOGEN UR STRIP-ACNC: NEGATIVE EU/DL
WBC # BLD AUTO: 10.93 K/UL (ref 3.9–12.7)
WBC #/AREA URNS HPF: 3 /HPF (ref 0–5)
WBC #/AREA URNS HPF: 90 /HPF (ref 0–5)
WBC CLUMPS URNS QL MICRO: ABNORMAL

## 2020-06-23 PROCEDURE — 96361 HYDRATE IV INFUSION ADD-ON: CPT

## 2020-06-23 PROCEDURE — 81025 URINE PREGNANCY TEST: CPT | Performed by: PHYSICIAN ASSISTANT

## 2020-06-23 PROCEDURE — 87086 URINE CULTURE/COLONY COUNT: CPT

## 2020-06-23 PROCEDURE — 80053 COMPREHEN METABOLIC PANEL: CPT

## 2020-06-23 PROCEDURE — 85025 COMPLETE CBC W/AUTO DIFF WBC: CPT

## 2020-06-23 PROCEDURE — 81000 URINALYSIS NONAUTO W/SCOPE: CPT

## 2020-06-23 PROCEDURE — 63600175 PHARM REV CODE 636 W HCPCS: Performed by: PHYSICIAN ASSISTANT

## 2020-06-23 PROCEDURE — 25000003 PHARM REV CODE 250: Performed by: PHYSICIAN ASSISTANT

## 2020-06-23 PROCEDURE — 25500020 PHARM REV CODE 255: Performed by: PHYSICIAN ASSISTANT

## 2020-06-23 PROCEDURE — 96374 THER/PROPH/DIAG INJ IV PUSH: CPT

## 2020-06-23 PROCEDURE — 99285 EMERGENCY DEPT VISIT HI MDM: CPT | Mod: 25

## 2020-06-23 PROCEDURE — 83690 ASSAY OF LIPASE: CPT

## 2020-06-23 RX ORDER — DICYCLOMINE HYDROCHLORIDE 20 MG/1
20 TABLET ORAL 2 TIMES DAILY
Qty: 20 TABLET | Refills: 0 | Status: SHIPPED | OUTPATIENT
Start: 2020-06-23 | End: 2020-07-23

## 2020-06-23 RX ORDER — KETOROLAC TROMETHAMINE 30 MG/ML
10 INJECTION, SOLUTION INTRAMUSCULAR; INTRAVENOUS
Status: COMPLETED | OUTPATIENT
Start: 2020-06-23 | End: 2020-06-23

## 2020-06-23 RX ORDER — ONDANSETRON 4 MG/1
4 TABLET, ORALLY DISINTEGRATING ORAL EVERY 6 HOURS PRN
Qty: 20 TABLET | Refills: 0 | OUTPATIENT
Start: 2020-06-23 | End: 2021-01-25

## 2020-06-23 RX ORDER — DICYCLOMINE HYDROCHLORIDE 10 MG/1
20 CAPSULE ORAL
Status: COMPLETED | OUTPATIENT
Start: 2020-06-23 | End: 2020-06-23

## 2020-06-23 RX ADMIN — DICYCLOMINE HYDROCHLORIDE 20 MG: 10 CAPSULE ORAL at 04:06

## 2020-06-23 RX ADMIN — IOHEXOL 100 ML: 350 INJECTION, SOLUTION INTRAVENOUS at 08:06

## 2020-06-23 RX ADMIN — KETOROLAC TROMETHAMINE 10 MG: 30 INJECTION, SOLUTION INTRAMUSCULAR at 08:06

## 2020-06-23 RX ADMIN — SODIUM CHLORIDE 1000 ML: 0.9 INJECTION, SOLUTION INTRAVENOUS at 05:06

## 2020-06-23 NOTE — ED NOTES
"ASYMPTOMATIC DECREASE BP:   Lying on the stretcher with HOB at semi-fowlers. AAOx4. States abdominal cramping 3/10 "better." States pain comes and goes. BP:90/60.  Denies n/v, dizziness, lightheadedness, weakness or any other discomfort at this time. Skin is warm and dry. Resp:18 even and unlabored. Comfort and BR needs addressed. Plan of care updated. NADN. Bed locked in low position, side rails up x2 and call light within reach. Angeline Thornton PA-C notified of repeat bp:90/60. Awaiting new orders. Will continue to monitor.  "

## 2020-06-23 NOTE — ED NOTES
29 y.o. female to ED with c.o. umbilical stomach pain/cramping with associated nausea and diarrhea since this morning. Patient endorses 2 episodes of vomiting this morning. Patient denies fever/chills, denies cough/ shortness of breath/ chest pain. Patient

## 2020-06-23 NOTE — ED PROVIDER NOTES
Encounter Date: 2020       History     Chief Complaint   Patient presents with    Abdominal Pain     Reports abdominal cramping, N/V/D that started around 4am. Denies fever/chills.    Nausea    Emesis     Patient is a 29-year-old female presenting to the emergency department for evaluation of nausea, vomiting, diarrhea.  Patient states her symptoms started this morning.  She states that she has severe abdominal cramping associated with it.  She reports at least 10 episodes of nonbloody diarrhea and 2 episodes of nonbloody emesis.  She has not been able tolerate anything by mouth today.  No fever chills.  No cough or shortness of breath.  No known sick contacts.  Patient was in her usual state of health yesterday, ate a normal dinner last night.  No medication use thus far. This is the extent of the patient's complaints at this time.       The history is provided by the patient.     Review of patient's allergies indicates:   Allergen Reactions    Ceftriaxone Edema     Past Medical History:   Diagnosis Date    Allergy     Recurrent upper respiratory infection (URI)      Past Surgical History:   Procedure Laterality Date    NO PAST SURGERIES       Family History   Problem Relation Age of Onset    Colon cancer Father     Cancer Father 46        colon ca    Diabetes Mother     Hypertension Mother     Breast cancer Neg Hx     Eclampsia Neg Hx     Miscarriages / Stillbirths Neg Hx     Ovarian cancer Neg Hx      labor Neg Hx     Stroke Neg Hx     Cervical cancer Neg Hx     Vaginal cancer Neg Hx     Endometrial cancer Neg Hx      Social History     Tobacco Use    Smoking status: Never Smoker    Smokeless tobacco: Never Used   Substance Use Topics    Alcohol use: Yes     Comment: social    Drug use: No     Review of Systems   Constitutional: Negative for activity change, appetite change, chills, fatigue and fever.   HENT: Negative for congestion, rhinorrhea and sore throat.    Eyes:  Negative for photophobia and visual disturbance.   Respiratory: Negative for cough, shortness of breath and wheezing.    Cardiovascular: Negative for chest pain.   Gastrointestinal: Positive for abdominal pain, diarrhea, nausea and vomiting.   Genitourinary: Negative for dysuria, hematuria and urgency.   Musculoskeletal: Negative for back pain, myalgias and neck pain.   Skin: Negative for color change and wound.   Neurological: Negative for weakness and headaches.   Psychiatric/Behavioral: Negative for agitation and confusion.       Physical Exam     Initial Vitals [06/23/20 1548]   BP Pulse Resp Temp SpO2   109/63 109 18 98.5 °F (36.9 °C) 96 %      MAP       --         Physical Exam    Nursing note and vitals reviewed.  Constitutional: Vital signs are normal. She appears well-developed and well-nourished. She is not diaphoretic. She is cooperative.  Non-toxic appearance. She does not have a sickly appearance. She does not appear ill. No distress.   Well-appearing,  female unaccompanied in the emergency room.  Speaking clearly full sentences.  No acute distress.   HENT:   Head: Normocephalic and atraumatic.   Right Ear: External ear normal.   Left Ear: External ear normal.   Nose: Nose normal.   Mouth/Throat: Oropharynx is clear and moist.   Eyes: Conjunctivae and EOM are normal.   Neck: Normal range of motion. Neck supple.   Cardiovascular: Normal rate.   Pulmonary/Chest: No respiratory distress.   Abdominal: Soft. Bowel sounds are normal. She exhibits no distension. There is abdominal tenderness in the epigastric area. There is no rebound and no guarding.   Musculoskeletal: Normal range of motion.   Neurological: She is alert and oriented to person, place, and time. GCS eye subscore is 4. GCS verbal subscore is 5. GCS motor subscore is 6.   Skin: Skin is warm.   Psychiatric: She has a normal mood and affect. Her behavior is normal. Judgment and thought content normal.         ED Course    Procedures  Labs Reviewed   CBC W/ AUTO DIFFERENTIAL - Abnormal; Notable for the following components:       Result Value    Mean Corpuscular Hemoglobin Conc 31.4 (*)     Gran # (ANC) 9.2 (*)     Gran% 84.4 (*)     Lymph% 9.7 (*)     All other components within normal limits   COMPREHENSIVE METABOLIC PANEL - Abnormal; Notable for the following components:    CO2 18 (*)     All other components within normal limits   URINALYSIS, REFLEX TO URINE CULTURE - Abnormal; Notable for the following components:    Appearance, UA Hazy (*)     Specific Gravity, UA >=1.030 (*)     Protein, UA Trace (*)     Bilirubin (UA) 1+ (*)     Leukocytes, UA 1+ (*)     All other components within normal limits    Narrative:     Preferred Collection Type->Urine, Clean Catch  Specimen Source->Urine   URINALYSIS MICROSCOPIC - Abnormal; Notable for the following components:    WBC, UA 90 (*)     WBC Clumps, UA Few (*)     Bacteria Moderate (*)     Trichomonas, UA Few (*)     All other components within normal limits    Narrative:     Preferred Collection Type->Urine, Clean Catch  Specimen Source->Urine   URINALYSIS, REFLEX TO URINE CULTURE - Abnormal; Notable for the following components:    Occult Blood UA Trace (*)     Leukocytes, UA Trace (*)     All other components within normal limits    Narrative:     Preferred Collection Type->Urine, Clean Catch  Specimen Source->Urine   URINALYSIS MICROSCOPIC - Abnormal; Notable for the following components:    RBC, UA 10 (*)     All other components within normal limits    Narrative:     Preferred Collection Type->Urine, Clean Catch  Specimen Source->Urine   CULTURE, URINE   LIPASE   POCT URINE PREGNANCY          Imaging Results          CT Abdomen Pelvis With Contrast (Final result)  Result time 06/23/20 21:19:55    Final result by Isis Gallagher MD (06/23/20 21:19:55)                 Impression:      Crenated or involuting left ovarian cyst.  Small free pelvic fluid.    Submucosal fat in the  right colonic wall.  Differential considerations may include chronic ulcerative colitis, chronic Crohn's, obesity, celiac disease, or other etiology.    Mild indistinct fat about a distal ileal loop and mild thickening of the associated ileal wall, which may be due to under distension and or enteritis.    Elongated right lobe.      Electronically signed by: Isis Gallagher  Date:    06/23/2020  Time:    21:19             Narrative:    EXAMINATION:  CT OF ABDOMEN PELVIS WITH    CLINICAL HISTORY:  Reports abdominal cramping.  Nausea, vomiting, diarrhea that started around 04:00.  Denies fever and chills.  Nausea and cysts.    TECHNIQUE:  5 mm enhanced axial images were obtained from the lung bases through the greater trochanters.  One hundred mL of Omnipaque 350 was injected.    COMPARISON:  None.    FINDINGS:  The right lobe of the liver is elongated, which may be due to a Riedel's lobe versus hepatomegaly.    The spleen, pancreas, kidneys, and adrenal glands are unremarkable. The gallbladder contains no calcified gallstones.  Phrygian cap is present.    There is no definite evidence for abdominal adenopathy or ascites.    There is submucosal fat seen particularly in the right colonic wall.  There is some indistinct fat and equivocal mild thickening about a distal ileal loop (series 2 axial image 112).  There are no pelvic masses or adenopathy.  There is a crenated or involuting left ovarian cyst.  The appendix is not inflamed.    There is small free fluid in the pelvis.    There is mild bibasilar atelectasis.                                 Medical Decision Making:   Initial Assessment:     Urgent evaluation of a 29-year-old female presenting to the emergency department for evaluation of abdominal pain, nausea, vomiting, diarrhea.  Patient is afebrile, nontoxic appearing, hemodynamically stable.  Physical exam reveals mild tenderness to palpation suprapubic abdomen.  No rebound or guarding.  Will plan for labs,  fluids, analgesics and reassess.    ED Management:    CBC shows no leukocytosis, normal H&H.  CMP shows no significant electrolyte abnormality, normal BUN and creatinine.  No elevation of LFTs.  Trace protein and bilirubin.  There is significant WBCs and bacteria however because there are over 100 squamous cells. Will repeat UA.    Repeat UA does not show any signs of infection.  On reassessment, patient states that she still having pain.  Will give Toradol obtain CT scan.    CT scan shows a developing left ovarian cyst, however no evidence to explain the patient's current nausea, vomiting, diarrhea.  On reassessment, patient states she is feeling much better.  Given patient's history and physical exam findings, signs symptoms consistent with a viral etiology.  She will be discharged home with a prescription for Zofran and Bentyl.  She is counseled extensively on home care treatment.  Discharged in stable condition.The patient was instructed to follow up with a primary care provider in 2 days or to return to the emergency department for worsening symptoms. The treatment plan was discussed with the patient who demonstrated understanding and comfort with plan.    This note was created using [employer] Fluency Direct. There may be typographical errors secondary to dictation.                                    Clinical Impression:     1. Nausea, vomiting, and diarrhea    2. Abdominal pain, unspecified abdominal location             ED Disposition Condition    Discharge Stable        ED Prescriptions     Medication Sig Dispense Start Date End Date Auth. Provider    ondansetron (ZOFRAN-ODT) 4 MG TbDL Take 1 tablet (4 mg total) by mouth every 6 (six) hours as needed. 20 tablet 6/23/2020  Angeline Thornton PA-C    dicyclomine (BENTYL) 20 mg tablet Take 1 tablet (20 mg total) by mouth 2 (two) times daily. 20 tablet 6/23/2020 7/23/2020 Angeline Thornton PA-C        Follow-up Information     Follow up With Specialties Details Why  Contact Info    Morris Campo MD Internal Medicine   [address] [facility] [address]  [phone]      [facility] Emergency Medicine   [address] [facility] [id number] [phone]                                     Angeline Thornton PA-C  06/23/20 1719

## 2020-06-25 LAB
BACTERIA UR CULT: NORMAL
BACTERIA UR CULT: NORMAL

## 2020-09-16 ENCOUNTER — HOSPITAL ENCOUNTER (EMERGENCY)
Facility: OTHER | Age: 30
Discharge: HOME | End: 2020-09-16
Attending: EMERGENCY MEDICINE
Payer: MEDICAID

## 2020-09-16 VITALS
DIASTOLIC BLOOD PRESSURE: 82 MMHG | TEMPERATURE: 98 F | HEART RATE: 81 BPM | HEIGHT: 67 IN | RESPIRATION RATE: 18 BRPM | OXYGEN SATURATION: 98 % | SYSTOLIC BLOOD PRESSURE: 123 MMHG | WEIGHT: 248 LBS | BODY MASS INDEX: 38.92 KG/M2

## 2020-09-16 DIAGNOSIS — M79.644 PAIN OF RIGHT THUMB: Primary | ICD-10-CM

## 2020-09-16 DIAGNOSIS — R20.2 PARESTHESIA OF RIGHT THUMB: ICD-10-CM

## 2020-09-16 LAB
B-HCG UR QL: NEGATIVE
CTP QC/QA: YES

## 2020-09-16 PROCEDURE — 99284 EMERGENCY DEPT VISIT MOD MDM: CPT | Mod: 25

## 2020-09-16 PROCEDURE — 25000003 PHARM REV CODE 250: Performed by: EMERGENCY MEDICINE

## 2020-09-16 PROCEDURE — 81025 URINE PREGNANCY TEST: CPT | Performed by: EMERGENCY MEDICINE

## 2020-09-16 RX ORDER — ORPHENADRINE CITRATE 100 MG/1
100 TABLET, EXTENDED RELEASE ORAL 2 TIMES DAILY
Qty: 20 TABLET | Refills: 0 | Status: SHIPPED | OUTPATIENT
Start: 2020-09-16 | End: 2020-09-26

## 2020-09-16 RX ORDER — IBUPROFEN 600 MG/1
600 TABLET ORAL
Status: COMPLETED | OUTPATIENT
Start: 2020-09-16 | End: 2020-09-16

## 2020-09-16 RX ORDER — ORPHENADRINE CITRATE 100 MG/1
100 TABLET, EXTENDED RELEASE ORAL
Status: COMPLETED | OUTPATIENT
Start: 2020-09-16 | End: 2020-09-16

## 2020-09-16 RX ORDER — IBUPROFEN 600 MG/1
600 TABLET ORAL EVERY 6 HOURS PRN
Qty: 20 TABLET | Refills: 0 | OUTPATIENT
Start: 2020-09-16 | End: 2021-01-25

## 2020-09-16 RX ADMIN — ORPHENADRINE CITRATE 100 MG: 100 TABLET, EXTENDED RELEASE ORAL at 06:09

## 2020-09-16 RX ADMIN — IBUPROFEN 600 MG: 600 TABLET, FILM COATED ORAL at 06:09

## 2020-09-16 NOTE — ED PROVIDER NOTES
Encounter Date: 2020    SCRIBE #1 NOTE: I, Andre Monge, am scribing for, and in the presence of, Dr. Calhoun.       History     Chief Complaint   Patient presents with    thumb pain     pt with right thumb pain and tingling up arm x 3 days     Time seen by provider: 5:19 PM    This is a 29 y.o. female who is right hand dominant presents with complaint of right thumb pain that began one week ago. She is also experience tingling that radiates up from the thumb and into the shoulder. She has been taking Naproxen, Tylenol arthritis, using episome salt, and ice pack with minimal relief of her symptoms. She denies any trauma or injury prior to the onset of her symptoms. She denies working with her hand frequently. Her pain is exacerbated with use of the right hand. This is the extent of the patient's complaints at this time.    The history is provided by the patient.     Review of patient's allergies indicates:   Allergen Reactions    Ceftriaxone Edema     Past Medical History:   Diagnosis Date    Allergy     Recurrent upper respiratory infection (URI)      Past Surgical History:   Procedure Laterality Date    NO PAST SURGERIES       Family History   Problem Relation Age of Onset    Colon cancer Father     Cancer Father 46        colon ca    Diabetes Mother     Hypertension Mother     Breast cancer Neg Hx     Eclampsia Neg Hx     Miscarriages / Stillbirths Neg Hx     Ovarian cancer Neg Hx      labor Neg Hx     Stroke Neg Hx     Cervical cancer Neg Hx     Vaginal cancer Neg Hx     Endometrial cancer Neg Hx      Social History     Tobacco Use    Smoking status: Never Smoker    Smokeless tobacco: Never Used   Substance Use Topics    Alcohol use: Yes     Comment: social    Drug use: No     Review of Systems   Constitutional: Negative for chills and fever.   HENT: Negative for congestion.    Eyes: Negative for discharge.   Respiratory: Negative for shortness of breath.    Cardiovascular:  Negative for chest pain.   Gastrointestinal: Negative for nausea and vomiting.   Genitourinary: Negative for dysuria.   Musculoskeletal: Negative for back pain.        Positive for right thumb pain.   Skin: Negative for rash.   Neurological: Positive for numbness (tingling of the right thum that radiates up into the shoulder). Negative for dizziness.   Hematological: Does not bruise/bleed easily.   All other systems reviewed and are negative.      Physical Exam     Initial Vitals [09/16/20 1628]   BP Pulse Resp Temp SpO2   118/78 87 18 98.1 °F (36.7 °C) 97 %      MAP       --         Physical Exam    Nursing note and vitals reviewed.  Constitutional: She appears well-developed and well-nourished.  Non-toxic appearance. She does not have a sickly appearance. No distress.   HENT:   Head: Normocephalic and atraumatic.   Nose: Nose normal.   Mouth/Throat: Oropharynx is clear and moist.   Eyes: Conjunctivae, EOM and lids are normal. Pupils are equal, round, and reactive to light. Right eye exhibits no nystagmus. Left eye exhibits no nystagmus.   Neck: Trachea normal, normal range of motion and phonation normal. Neck supple. No stridor present.   Cardiovascular: Normal rate, regular rhythm, normal heart sounds and normal pulses. Exam reveals no gallop and no friction rub.    No murmur heard.  Pulmonary/Chest: Breath sounds normal. No respiratory distress. She has no wheezes. She has no rhonchi. She has no rales.   Abdominal: Soft. Normal appearance and bowel sounds are normal. She exhibits no distension. There is no abdominal tenderness. There is no rigidity, no rebound, no guarding, no tenderness at McBurney's point and negative Bond's sign.   Musculoskeletal: Tenderness present. No edema.      Comments: Tenderness over the del real aspect of the right thumb and thenar eminence.  Negative Finkelstein test. No tenderness over the right wrist. FROM of right wrist, elbow, and shoulder. Negative Phalen's test. Tenderness to  the right medial anterior shoulder.   Neurological: She is alert and oriented to person, place, and time. She has normal strength and normal reflexes. She displays normal reflexes. No cranial nerve deficit or sensory deficit. She displays a negative Romberg sign. GCS eye subscore is 4. GCS verbal subscore is 5. GCS motor subscore is 6.   Skin: Skin is warm, dry and intact. Capillary refill takes less than 2 seconds. No rash noted. No pallor.   Psychiatric: Her speech is normal and behavior is normal.         ED Course   Procedures  Labs Reviewed   POCT URINE PREGNANCY          Imaging Results          X-Ray Hand 3 view Right (Final result)  Result time 09/16/20 17:38:50    Final result by Karson Butt MD (09/16/20 17:38:50)                 Impression:      No acute process.      Electronically signed by: Karson Butt MD  Date:    09/16/2020  Time:    17:38             Narrative:    EXAMINATION:  XR HAND COMPLETE 3 VIEW RIGHT    CLINICAL HISTORY:  thumb pain;    TECHNIQUE:  PA, lateral, and oblique views of the right hand were performed.    COMPARISON:  None    FINDINGS:  The bone mineralization is within normal limits.  There is no cortical step-off.  There is no evidence of periostitis.    The joint spaces are maintained.  The soft tissues are unremarkable.  No radiopaque foreign body is identified.    There is no evidence of fracture or dislocation of the right hand.                              X-Rays:   Independently Interpreted Readings:   Other Readings:  Right hand: No fracture. No dislocation. No foreign body.    Medical Decision Making:   History:   Old Medical Records: I decided to obtain old medical records.  Initial Assessment:   21-year-old female with right hand pain specifically the right thumb.  Differential could include fracture, dislocation, deQuarvains tenovitis, carpal tunnel syndrome, paresthesias.  Will get an x-ray to rule out fracture although I have a low suspicion.  I do suspect this most  likely paresthesias secondary to nerve impingement.  Do not suspect stroke.  Independently Interpreted Test(s):   I have ordered and independently interpreted X-rays - see prior notes.  Clinical Tests:   Lab Tests: Ordered and Reviewed  Radiological Study: Ordered and Reviewed  ED Management:  X-ray showed no acute fracture.  Will discharge with a splint to follow up with primary care as an outpatient.    Patient discharged home in stable condition. Diagnosis and treatment plan explained to patient and/or family member who is at bedside. I have answered all questions and the patient is satisfied with the plan of care. Strict return precautions given. The patient demonstrates understanding of the care plan. This is the extent to the patients complaints today here in the emergency department.            Scribe Attestation:   Scribe #1: I performed the above scribed service and the documentation accurately describes the services I performed. I attest to the accuracy of the note.    Attending Attestation:           Physician Attestation for Scribe:  Physician Attestation Statement for Scribe #1: I, Dr. Calhoun, reviewed documentation, as scribed by Andre Monge in my presence, and it is both accurate and complete.                           Clinical Impression:     ICD-10-CM ICD-9-CM   1. Pain of right thumb  M79.644 729.5   2. Paresthesia of right thumb  R20.2 782.0                   1. Pain of right thumb    2. Paresthesia of right thumb          ED Disposition Condition    Discharge Stable        ED Prescriptions     Medication Sig Dispense Start Date End Date Auth. Provider    ibuprofen (ADVIL,MOTRIN) 600 MG tablet Take 1 tablet (600 mg total) by mouth every 6 (six) hours as needed for Pain. 20 tablet 9/16/2020  Aman Calhoun DO    orphenadrine (NORFLEX) 100 mg tablet Take 1 tablet (100 mg total) by mouth 2 (two) times daily. for 10 days 20 tablet 9/16/2020 9/26/2020 Aman Calhoun DO         Follow-up Information     Follow up With Specialties Details Why Contact Info    Morris Campo MD Internal Medicine  As needed [address] [facility] [address]  [phone]                                         Aman Calhoun,   09/16/20 1940

## 2020-09-16 NOTE — ED NOTES
Pt to ED with c/o non-traumatic R thumb pain x1 week with radiation of pain and numbness/tingling into RUE. No obvious deformity noted to R thumb, limited ROM secondary to pain. Denies fever, chills, SOB.     Two patient identifiers have been checked and are correct.      Appearance: Pt awake, alert & oriented to person, place & time. Pt in no acute distress at present time. Pt is clean and well groomed with clothes appropriately fastened.   Skin: Skin warm, dry & intact. Color consistent with ethnicity. Mucous membranes moist. No breakdown or brusing noted.   Musculoskeletal: Patient moving all extremities well, no obvious swelling or deformities noted. R thumb pain.  Respiratory: Respirations spontaneous, even, and non-labored. Visible chest rise noted. Airway is open and patent. No accessory muscle use noted.   Neurologic: Sensation is intact. Speech is clear and appropriate. Eyes open spontaneously, behavior appropriate to situation, follows commands, facial expression symmetrical, bilateral hand grasp equal and even, purposeful motor response noted.  Cardiac: All peripheral pulses present. No Bilateral lower extremity edema. Cap refill is <3 seconds.  Abdomen: Abdomen soft, non-tender to palpation.   : Pt reports no dysuria or hematuria.

## 2020-09-16 NOTE — DISCHARGE INSTRUCTIONS
Follow up with [facility] orthopedics [phone] if no improvement in 2 weeks

## 2020-09-16 NOTE — FIRST PROVIDER EVALUATION
Emergency Department TeleTriage Encounter Note      CHIEF COMPLAINT    Chief Complaint   Patient presents with    thumb pain     pt with right thumb pain and tingling up arm x 3 days       VITAL SIGNS   Initial Vitals [09/16/20 1628]   BP Pulse Resp Temp SpO2   118/78 87 18 98.1 °F (36.7 °C) 97 %      MAP       --            ALLERGIES    Review of patient's allergies indicates:   Allergen Reactions    Ceftriaxone Edema       PROVIDER TRIAGE NOTE  This is a teletriage evaluation of a 29 y.o. female presenting to the ED with c/o pain in tingling in the right thumb over the thenar eminence.  Pain worsened with wrist flexion while holding fist. Initial orders will be placed and care will be transferred to an alternate provider when patient is roomed for a full evaluation. Any additional orders and the final disposition will be determined by that provider.         ORDERS  Labs Reviewed   POCT URINE PREGNANCY       ED Orders (720h ago, onward)    Start Ordered     Status Ordering Provider    09/16/20 1631 09/16/20 1630  POCT urine pregnancy  Once      Ordered HUYEN VELAZQUEZ            Virtual Visit Note: The provider triage portion of this emergency department evaluation and documentation was performed via [employer], a HIPAA-compliant telemedicine application, in concert with a tele-presenter in the room. A face to face patient evaluation with one of my colleagues will occur once the patient is placed in an emergency department room.      DISCLAIMER: This note was prepared with Fluency*wireWAX voice recognition transcription software. Garbled syntax, mangled pronouns, and other bizarre constructions may be attributed to that software system.

## 2020-09-23 ENCOUNTER — OFFICE VISIT (OUTPATIENT)
Dept: OBSTETRICS AND GYNECOLOGY | Facility: CLINIC | Age: 30
End: 2020-09-23
Payer: MEDICAID

## 2020-09-23 VITALS — BODY MASS INDEX: 38.76 KG/M2 | WEIGHT: 246.94 LBS | HEIGHT: 67 IN

## 2020-09-23 DIAGNOSIS — L73.9 INFLAMED HAIR FOLLICLE: Primary | ICD-10-CM

## 2020-09-23 PROCEDURE — 99213 OFFICE O/P EST LOW 20 MIN: CPT | Mod: PBBFAC | Performed by: PHYSICIAN ASSISTANT

## 2020-09-23 PROCEDURE — 99213 OFFICE O/P EST LOW 20 MIN: CPT | Mod: S$PBB,,, | Performed by: PHYSICIAN ASSISTANT

## 2020-09-23 PROCEDURE — 99999 PR PBB SHADOW E&M-EST. PATIENT-LVL III: ICD-10-PCS | Mod: PBBFAC,,, | Performed by: PHYSICIAN ASSISTANT

## 2020-09-23 PROCEDURE — 99213 PR OFFICE/OUTPT VISIT, EST, LEVL III, 20-29 MIN: ICD-10-PCS | Mod: S$PBB,,, | Performed by: PHYSICIAN ASSISTANT

## 2020-09-23 PROCEDURE — 99999 PR PBB SHADOW E&M-EST. PATIENT-LVL III: CPT | Mod: PBBFAC,,, | Performed by: PHYSICIAN ASSISTANT

## 2020-09-23 RX ORDER — MUPIROCIN CALCIUM 20 MG/G
CREAM TOPICAL 3 TIMES DAILY
Qty: 30 G | Refills: 1 | OUTPATIENT
Start: 2020-09-23 | End: 2021-01-25

## 2020-09-23 RX ORDER — LIDOCAINE HYDROCHLORIDE 20 MG/ML
JELLY TOPICAL
Qty: 30 ML | Refills: 1 | Status: SHIPPED | OUTPATIENT
Start: 2020-09-23 | End: 2020-09-25

## 2020-09-23 NOTE — PROGRESS NOTES
CC:    HPI: Pt is a 29 y.o.  female who presents for inflamed hair follicle. States she shaved about a week ago and noticed the irritation to the lower right vaginal area. States that it has been constant for a week, without improvement. States she's tried warm compresses without improvement. Reports history of inflamed hair follicles. No other concerns today.        ROS:  GENERAL: Feeling well overall. Denies fever or chills.   SKIN: Denies rash or lesions.   HEAD: Denies head injury or headache.   NODES: Denies enlarged lymph nodes.   CHEST: Denies chest pain or shortness of breath.   CARDIOVASCULAR: Denies palpitations or left sided chest pain.   ABDOMEN: No abdominal pain, constipation, diarrhea, nausea, vomiting or rectal bleeding.   URINARY: No dysuria, hematuria, or burning on urination.  REPRODUCTIVE: See HPI.       PE:   APPEARANCE: Well nourished, well developed, Black or  female in no acute distress.  ABDOMEN: Soft, non-tender   LUNGS: Non-labored breathing, without distress   VULVA: 1CM MOBILE MASS AT LOWER RIGHT VULVA WITHOUT ERYTHEMA OR EDEMA, NONBLISTERING. Normal external female genitalia.  URETHRAL MEATUS: Normal size and location, no lesions, no prolapse.  URETHRA: No masses, tenderness, or prolapse.          Diagnosis:  1. Inflamed hair follicle        Plan:     Orders Placed This Encounter    mupirocin calcium 2% (BACTROBAN) 2 % cream    lidocaine HCL 2% (XYLOCAINE) 2 % jelly         Follow-up PRN no resolution of symptoms.

## 2020-09-24 ENCOUNTER — TELEPHONE (OUTPATIENT)
Dept: OBSTETRICS AND GYNECOLOGY | Facility: CLINIC | Age: 30
End: 2020-09-24

## 2020-09-24 NOTE — TELEPHONE ENCOUNTER
----- Message from Conchita Noguera sent at 9/24/2020 12:57 PM CDT -----  Contact: GUY CHAPPELL [[id number]]  Type: Patient Call Back    Who called: GUY CHAPPELL [[id number]]    What is the request in detail: Patient is requesting a call back. [facility] She states that [facility] has the pills but they do not have the cream only the ointment. She would like to know if she is able to get the ointment.  Please advise.    Can the clinic reply by [facility]? No    Best call back number: ..[phone]     Additional Information: N/A

## 2020-09-24 NOTE — TELEPHONE ENCOUNTER
Spoke with pt advised per Arianne pt can use ointment pt requested Rx be sent to [facility]. Pt voiced understanding with no questions.

## 2020-09-25 DIAGNOSIS — L73.9 INFLAMED HAIR FOLLICLE: Primary | ICD-10-CM

## 2020-09-25 RX ORDER — LIDOCAINE HYDROCHLORIDE 20 MG/ML
JELLY TOPICAL
Qty: 30 ML | Refills: 1 | Status: SHIPPED | OUTPATIENT
Start: 2020-09-25 | End: 2021-09-25

## 2020-09-25 RX ORDER — MUPIROCIN 20 MG/G
OINTMENT TOPICAL 3 TIMES DAILY
Qty: 1 TUBE | Refills: 0 | OUTPATIENT
Start: 2020-09-25 | End: 2021-01-25

## 2020-10-07 ENCOUNTER — OFFICE VISIT (OUTPATIENT)
Dept: INTERNAL MEDICINE | Facility: CLINIC | Age: 30
End: 2020-10-07
Payer: MEDICAID

## 2020-10-07 ENCOUNTER — LAB VISIT (OUTPATIENT)
Dept: LAB | Facility: OTHER | Age: 30
End: 2020-10-07
Attending: STUDENT IN AN ORGANIZED HEALTH CARE EDUCATION/TRAINING PROGRAM
Payer: MEDICAID

## 2020-10-07 VITALS
BODY MASS INDEX: 39.42 KG/M2 | OXYGEN SATURATION: 98 % | HEART RATE: 78 BPM | DIASTOLIC BLOOD PRESSURE: 74 MMHG | SYSTOLIC BLOOD PRESSURE: 112 MMHG | HEIGHT: 67 IN | WEIGHT: 251.13 LBS

## 2020-10-07 DIAGNOSIS — R63.5 WEIGHT GAIN: Primary | ICD-10-CM

## 2020-10-07 DIAGNOSIS — R63.5 WEIGHT GAIN: ICD-10-CM

## 2020-10-07 DIAGNOSIS — Z28.21 REFUSED INFLUENZA VACCINE: ICD-10-CM

## 2020-10-07 DIAGNOSIS — Z11.4 SCREENING FOR HIV (HUMAN IMMUNODEFICIENCY VIRUS): ICD-10-CM

## 2020-10-07 DIAGNOSIS — E66.9 OBESITY (BMI 35.0-39.9 WITHOUT COMORBIDITY): ICD-10-CM

## 2020-10-07 DIAGNOSIS — Z00.00 PREVENTATIVE HEALTH CARE: ICD-10-CM

## 2020-10-07 DIAGNOSIS — K59.00 CONSTIPATION, UNSPECIFIED CONSTIPATION TYPE: Chronic | ICD-10-CM

## 2020-10-07 DIAGNOSIS — H10.13 ALLERGIC CONJUNCTIVITIS, BILATERAL: ICD-10-CM

## 2020-10-07 DIAGNOSIS — Z11.59 NEED FOR HEPATITIS C SCREENING TEST: ICD-10-CM

## 2020-10-07 LAB
ALBUMIN SERPL BCP-MCNC: 4 G/DL (ref 3.5–5.2)
ALP SERPL-CCNC: 73 U/L (ref 55–135)
ALT SERPL W/O P-5'-P-CCNC: 23 U/L (ref 10–44)
ANION GAP SERPL CALC-SCNC: 11 MMOL/L (ref 8–16)
AST SERPL-CCNC: 19 U/L (ref 10–40)
BASOPHILS # BLD AUTO: 0.01 K/UL (ref 0–0.2)
BASOPHILS NFR BLD: 0.1 % (ref 0–1.9)
BILIRUB SERPL-MCNC: 0.2 MG/DL (ref 0.1–1)
BUN SERPL-MCNC: 7 MG/DL (ref 6–20)
CALCIUM SERPL-MCNC: 9.4 MG/DL (ref 8.7–10.5)
CHLORIDE SERPL-SCNC: 105 MMOL/L (ref 95–110)
CHOLEST SERPL-MCNC: 163 MG/DL (ref 120–199)
CHOLEST/HDLC SERPL: 3.9 {RATIO} (ref 2–5)
CO2 SERPL-SCNC: 23 MMOL/L (ref 23–29)
CREAT SERPL-MCNC: 0.9 MG/DL (ref 0.5–1.4)
DIFFERENTIAL METHOD: ABNORMAL
EOSINOPHIL # BLD AUTO: 0 K/UL (ref 0–0.5)
EOSINOPHIL NFR BLD: 0.3 % (ref 0–8)
ERYTHROCYTE [DISTWIDTH] IN BLOOD BY AUTOMATED COUNT: 13.2 % (ref 11.5–14.5)
EST. GFR  (AFRICAN AMERICAN): >60 ML/MIN/1.73 M^2
EST. GFR  (NON AFRICAN AMERICAN): >60 ML/MIN/1.73 M^2
GLUCOSE SERPL-MCNC: 87 MG/DL (ref 70–110)
HCT VFR BLD AUTO: 39.7 % (ref 37–48.5)
HDLC SERPL-MCNC: 42 MG/DL (ref 40–75)
HDLC SERPL: 25.8 % (ref 20–50)
HGB BLD-MCNC: 12.5 G/DL (ref 12–16)
IMM GRANULOCYTES # BLD AUTO: 0.02 K/UL (ref 0–0.04)
IMM GRANULOCYTES NFR BLD AUTO: 0.3 % (ref 0–0.5)
LDLC SERPL CALC-MCNC: 109.8 MG/DL (ref 63–159)
LYMPHOCYTES # BLD AUTO: 2 K/UL (ref 1–4.8)
LYMPHOCYTES NFR BLD: 26.7 % (ref 18–48)
MCH RBC QN AUTO: 30.4 PG (ref 27–31)
MCHC RBC AUTO-ENTMCNC: 31.5 G/DL (ref 32–36)
MCV RBC AUTO: 97 FL (ref 82–98)
MONOCYTES # BLD AUTO: 0.3 K/UL (ref 0.3–1)
MONOCYTES NFR BLD: 4.2 % (ref 4–15)
NEUTROPHILS # BLD AUTO: 5.2 K/UL (ref 1.8–7.7)
NEUTROPHILS NFR BLD: 68.4 % (ref 38–73)
NONHDLC SERPL-MCNC: 121 MG/DL
NRBC BLD-RTO: 0 /100 WBC
PLATELET # BLD AUTO: 352 K/UL (ref 150–350)
PMV BLD AUTO: 10.3 FL (ref 9.2–12.9)
POTASSIUM SERPL-SCNC: 4.4 MMOL/L (ref 3.5–5.1)
PROT SERPL-MCNC: 7.8 G/DL (ref 6–8.4)
RBC # BLD AUTO: 4.11 M/UL (ref 4–5.4)
SODIUM SERPL-SCNC: 139 MMOL/L (ref 136–145)
TRIGL SERPL-MCNC: 56 MG/DL (ref 30–150)
TSH SERPL DL<=0.005 MIU/L-ACNC: 1.36 UIU/ML (ref 0.4–4)
WBC # BLD AUTO: 7.57 K/UL (ref 3.9–12.7)

## 2020-10-07 PROCEDURE — 80053 COMPREHEN METABOLIC PANEL: CPT

## 2020-10-07 PROCEDURE — 84443 ASSAY THYROID STIM HORMONE: CPT

## 2020-10-07 PROCEDURE — 36415 COLL VENOUS BLD VENIPUNCTURE: CPT

## 2020-10-07 PROCEDURE — 99999 PR PBB SHADOW E&M-EST. PATIENT-LVL IV: ICD-10-PCS | Mod: PBBFAC,,, | Performed by: STUDENT IN AN ORGANIZED HEALTH CARE EDUCATION/TRAINING PROGRAM

## 2020-10-07 PROCEDURE — 85025 COMPLETE CBC W/AUTO DIFF WBC: CPT

## 2020-10-07 PROCEDURE — 86803 HEPATITIS C AB TEST: CPT

## 2020-10-07 PROCEDURE — 99395 PR PREVENTIVE VISIT,EST,18-39: ICD-10-PCS | Mod: S$PBB,,, | Performed by: STUDENT IN AN ORGANIZED HEALTH CARE EDUCATION/TRAINING PROGRAM

## 2020-10-07 PROCEDURE — 99395 PREV VISIT EST AGE 18-39: CPT | Mod: S$PBB,,, | Performed by: STUDENT IN AN ORGANIZED HEALTH CARE EDUCATION/TRAINING PROGRAM

## 2020-10-07 PROCEDURE — 99214 OFFICE O/P EST MOD 30 MIN: CPT | Mod: PBBFAC | Performed by: STUDENT IN AN ORGANIZED HEALTH CARE EDUCATION/TRAINING PROGRAM

## 2020-10-07 PROCEDURE — 99999 PR PBB SHADOW E&M-EST. PATIENT-LVL IV: CPT | Mod: PBBFAC,,, | Performed by: STUDENT IN AN ORGANIZED HEALTH CARE EDUCATION/TRAINING PROGRAM

## 2020-10-07 PROCEDURE — 80061 LIPID PANEL: CPT

## 2020-10-07 PROCEDURE — 86703 HIV-1/HIV-2 1 RESULT ANTBDY: CPT

## 2020-10-07 NOTE — PATIENT INSTRUCTIONS
Finding Your Ideal Weight  Most of the people in magazines and on TV are far slimmer than average, yet this is the ideal that many people aim for. Before you decide that you wont be happy until you get down to a certain number of pounds, consider:     · Your age. You probably wish you could get back to your college weight. But normal changes in metabolism and hormone levels that occur with aging can make this more difficult.  · Your gender. In general, men have more muscle and heavier bones than women, which means that healthy men usually weigh more than healthy women of the same height.  · Your current weight. If you are very heavy, focus on losing a smaller amount (such as 10 percent of your body weight). Losing just 5 to 10 pounds can improve your health.  Your body fat percentage  A pound of muscle weighs the same as a pound of fat, but it takes up less space. Think of a trained athlete and a couch potato. Even though they may be the same height and weight, the athlete looks fitter, is healthier, and probably wears a smaller size of clothing. If you are muscular, a body fat test may be a more accurate measure of your ideal weight than the bathroom scale. Talk to your healthcare provider, who can help you set appropriate goals for yourself.  Body mass index (BMI)  Your body mass index is a number calculated from your weight and height. It is a fairly reliable indivactor of body fatness for most people. To calculate your BMI, see this BMI calculator from the CDC: http://www.cdc.gov/healthyweight/assessing/bmi/adult_bmi/english_bmi_calculator/bmi_calculator.html  Date Last Reviewed: 6/1/2015  © 7512-5140 [employer]. [address] [place] [address], [place] [address]. All rights reserved. This information is not intended as a substitute for professional medical care. Always follow your healthcare professional's instructions.

## 2020-10-07 NOTE — PROGRESS NOTES
" [facility]    Subjective:       Patient ID: Sarina Ann is a 29 y.o. female.    Chief Complaint: Establish Care & weight gain      History was obtained from the patient and supplemented through chart review.  There were multiple ER visits in the past few months. This patient is new to me.    ER doc from Sept 16 recommended PCP for wrist/thumb  Pain and pt tried to stay on top of things (such as weight, check-ups)    HPI:    Patient is a 29 y.o. female with no chronic medical problems.  She does have stress not having a job, was working for [employer].  Was working approx 50 hours, reduced to 4 hours which kept her from getting unemployment.    Has been Stress eating  Gained 30 pounds through covid, not working  Has been trying to garden    Exercise: starting Detox- willing to quit ETOH rest of Oct, walking daily.  Thinking ETOH is main source of calories currentrly    Right handed thumb pain/wrist  Wearing a brace, icing  Already taking occasional naproxen, ibupfroen  Better at the moment  Sometimes "shakes" hands to get rid of pain  Worse with typing    Constipation  Not drinking much water  Struggles with pain    Medical History  Past Medical History:   Diagnosis Date    Allergy     Recurrent upper respiratory infection (URI)        Review of Systems   Constitutional: Negative for appetite change, diaphoresis and fever.   HENT: Negative for rhinorrhea and trouble swallowing.    Respiratory: Negative for shortness of breath.    Cardiovascular: Negative for chest pain.   Gastrointestinal: Negative for diarrhea, nausea and vomiting.   Genitourinary: Negative for hematuria.   Musculoskeletal: Negative for gait problem and myalgias.   Neurological: Negative for dizziness, seizures and weakness.   Psychiatric/Behavioral: Negative for hallucinations. The patient is not nervous/anxious.          Surgical hx, family hx, social hx   Dad- with coon cancer age 46  MOM- DM, HTN  ETOH: 3/evening " x weekends only, 1 if driving  Sex: men none now  Have been reviewed      Current Outpatient Medications:     albuterol (PROVENTIL/VENTOLIN HFA) 90 mcg/actuation inhaler, Inhale 1-2 puffs into the lungs every 6 (six) hours as needed for Wheezing or Shortness of Breath. Rescue, Disp: 6.7 g, Rfl: 0    azelastine (ASTELIN) 137 mcg (0.1 %) nasal spray, 1-2 sprays (137-274 mcg total) by Nasal route 2 (two) times daily as needed for Rhinitis., Disp: 30 mL, Rfl: 5    cetirizine (ZYRTEC) 10 MG tablet, Take 10 mg by mouth once daily., Disp: , Rfl:     diphenhydrAMINE (BENADRYL) 12.5 mg/5 mL elixir, Take by mouth every 8 (eight) hours., Disp: , Rfl:     fluticasone propionate (FLONASE) 50 mcg/actuation nasal spray, 1 spray (50 mcg total) by Each Nostril route 2 (two) times daily as needed., Disp: 15 g, Rfl: 0    lidocaine HCL 2% (XYLOCAINE) 2 % jelly, Apply to affected area daily as needed, Disp: 30 mL, Rfl: 1    naproxen (EC-NAPROSYN) 375 MG TbEC EC tablet, Take 1 tablet (375 mg total) by mouth 2 (two) times daily with meals., Disp: 20 tablet, Rfl: 0    phenylephrine-acetaminophen-GG (MUCINEX COLD AND SINUS) -400 mg/20 mL Liqd, Take by mouth., Disp: , Rfl:     pseudoephed/acetaminophen/cpm (THERAFLU COLD & SORE THROAT ORAL), Take by mouth., Disp: , Rfl:     azithromycin (Z-PATRICIA) 250 MG tablet, Take 1 tablet (250 mg total) by mouth once daily. Take first 2 tablets together, then 1 every day until finished., Disp: 6 tablet, Rfl: 0    ibuprofen (ADVIL,MOTRIN) 600 MG tablet, Take 1 tablet (600 mg total) by mouth every 6 (six) hours as needed for Pain. (Patient not taking: Reported on 10/7/2020), Disp: 9 tablet, Rfl: 0    ibuprofen (ADVIL,MOTRIN) 600 MG tablet, Take 1 tablet (600 mg total) by mouth every 6 (six) hours as needed for Pain. (Patient not taking: Reported on 10/7/2020), Disp: 20 tablet, Rfl: 0    mupirocin (BACTROBAN) 2 % ointment, Apply topically 3 (three) times daily. (Patient not taking: Reported  "on 10/7/2020), Disp: 1 Tube, Rfl: 0    mupirocin calcium 2% (BACTROBAN) 2 % cream, Apply topically 3 (three) times daily. (Patient not taking: Reported on 10/7/2020), Disp: 30 g, Rfl: 1    nitrofurantoin, macrocrystal-monohydrate, (MACROBID) 100 MG capsule, Take 1 capsule (100 mg total) by mouth 2 (two) times daily. (Patient not taking: Reported on 10/7/2020), Disp: 14 capsule, Rfl: 0    ondansetron (ZOFRAN-ODT) 4 MG TbDL, Take 1 tablet (4 mg total) by mouth every 6 (six) hours as needed. (Patient not taking: Reported on 10/7/2020), Disp: 20 tablet, Rfl: 0    oxyCODONE-acetaminophen (PERCOCET) 5-325 mg per tablet, Take 1 tablet by mouth every 6 (six) hours as needed for Pain. (Patient not taking: Reported on 10/7/2020), Disp: 5 tablet, Rfl: 0    valACYclovir (VALTREX) 500 MG tablet, Take 1 tablet (500 mg total) by mouth 2 (two) times daily. for 3 days, Disp: 6 tablet, Rfl: 2    Objective:        Body mass index is 39.33 kg/m².  Vitals:    10/07/20 1310   BP: 112/74   Pulse: 78   SpO2: 98%   Weight: 113.9 kg (251 lb 1.7 oz)   Height: 5' 7" (1.702 m)   PainSc: 0-No pain     Physical Exam  Vitals signs and nursing note reviewed.   Constitutional:       General: She is not in acute distress.     Appearance: Normal appearance. She is well-developed. She is not diaphoretic.      Comments: Obese   HENT:      Head: Normocephalic and atraumatic.   Eyes:      Conjunctiva/sclera: Conjunctivae normal.      Pupils: Pupils are equal, round, and reactive to light.   Neck:      Musculoskeletal: Normal range of motion and neck supple.      Vascular: No JVD.   Cardiovascular:      Rate and Rhythm: Normal rate and regular rhythm.      Heart sounds: Normal heart sounds. No murmur.   Pulmonary:      Effort: Pulmonary effort is normal. No respiratory distress.      Breath sounds: Normal breath sounds. No wheezing or rales.   Abdominal:      General: Bowel sounds are normal. There is no distension.      Palpations: Abdomen is soft. " There is no mass.      Tenderness: There is no abdominal tenderness.   Musculoskeletal: Normal range of motion.   Skin:     General: Skin is warm and dry.      Capillary Refill: Capillary refill takes less than 2 seconds.   Neurological:      Mental Status: She is alert and oriented to person, place, and time.      Cranial Nerves: No cranial nerve deficit.   Psychiatric:         Behavior: Behavior normal.           Lab Results   Component Value Date    WBC 10.93 06/23/2020    HGB 13.0 06/23/2020    HCT 41.4 06/23/2020     06/23/2020    CHOL 146 12/06/2017    TRIG 75 12/06/2017    HDL 37 (L) 12/06/2017    ALT 15 06/23/2020    AST 18 06/23/2020     06/23/2020    K 4.2 06/23/2020     06/23/2020    CREATININE 1.0 06/23/2020    BUN 9 06/23/2020    CO2 18 (L) 06/23/2020    TSH 2.262 12/12/2018    HGBA1C 5.0 12/12/2018       The ASCVD Risk score (Beaver DC Jr., et al., 2013) failed to calculate for the following reasons:    The 2013 ASCVD risk score is only valid for ages 40 to 79    (Imaging have been independently reviewed)  CT abdom pelvis performed in ER June 2020    Reviewed myself- no obstruction, no obvious inflammation     Crenated or involuting left ovarian cyst.  Small free pelvic fluid.    Submucosal fat in the right colonic wall.  Differential considerations  may include chronic ulcerative colitis, chronic Crohn's, obesity,  celiac disease, or other etiology.    Mild indistinct fat about a distal ileal loop and mild thickening of the  associated ileal wall, which may be due to under distension and or  enteritis.    Elongated right lobe.    Assessment:         1. Weight gain    2. Screening for HIV (human immunodeficiency virus)    3. Need for hepatitis C screening test    4. Preventative health care    5. Constipation, unspecified constipation type    6. Obesity (BMI 35.0-39.9 without comorbidity)    7. Allergic conjunctivitis, bilateral    8. Refused influenza vaccine          Plan:      Sarina was seen today for establish care.    Diagnoses and all orders for this visit:    Weight gain  Comments:  Counseled on myfitnesspal, recording all intake  Diet main  of weight loss  Recommend at least 1 lb weight loss a week for sustainable weight control  Orders:  -     Lipid Panel; Future  -     TSH; Future    Screening for HIV (human immunodeficiency virus)  -     HIV 1/2 Ag/Ab (4th Gen); Future    Need for hepatitis C screening test  -     Hepatitis C Antibody; Future    Preventative health care  Comments:  Declined immunizations (all- bad reaction to steroid/abx shot in past)  Lipids fasting  CMP (fasting glucose), CBC  TSH  Orders:  -     CBC auto differential; Future  -     Comprehensive Metabolic Panel; Future    Constipation, unspecified constipation type  Comments:  Pt dislikes metamucil texture  Recommended miralax liquid OTC prn    Obesity (BMI 35.0-39.9 without comorbidity)    Allergic conjunctivitis, bilateral  Comments:  Cont otc treatment    Refused influenza vaccine        Health Maintenance  - Lipids: No results found for: LIPIDTRIG  - A1C: fasting glucose  - Colon Ca Screen: N/A due to age  - Immunizations: refused    Women's health  - Pap: Needs to return to Dr. Adair (last was neg in 2017)  - Contraception: pull-out or condoms, pt willing to get pregnant though not planning on it.  No sex right now.  counseled on fat producing estrogen sometimes causing cycle difficulties.  Counseled on possibility of getting pregnant at any time if sexually active and not intentionally preventing it     No follow-ups on file.        All medications were reviewed including potential side effects and risks/benefits.  Pt was counseled to call back if anything worsens or if questions arise.    Waqas Humphries MD  Family Medicine  [facility]  [facility]  [address] [place] [id number]  Phone [phone]  Fax [phone]

## 2020-10-09 LAB
HCV AB SERPL QL IA: NEGATIVE
HIV 1+2 AB+HIV1 P24 AG SERPL QL IA: NEGATIVE

## 2020-12-28 NOTE — TELEPHONE ENCOUNTER
----- Message from Dmitry Berry sent at 1/10/2020  4:26 PM CST -----  PT NEEDS A REFILL ON [facility] ON FILE   Nutrition Care Plan    Nutrition Diagnosis:   Moderate Protein Calorie Malnutrition related to s/p abdominal surgery as evidenced by history of nausea/vomiting, NPO/clear liquids recently, utilizing TPN to meet needs, weight loss of -16.6kg (9%) over 4 months and meeting <75% estimated needs for >1 month.    Intervention:  Modified diet:   NPO   -- Advancement per provider     Parenteral formula/solution:  Parenteral Nutrition Order: Central PN with lipids x3 days/week  Access Site: PICC  Calories Provided by Parenteral Nutrition: 1598kcals daily average  Protein Provided by Parenteral Nutrition: 125g  Dextrose Provided by Parenteral Nutrition: 260g  Other Provided by Parenteral Nutrition: 250ml 20% lipids x3 days/week  -- Latest triglycerides <250, will begin lipid infusion three times weekly     Monitoring and Evaluation:  Parenteral formula/solution:  Goal for patient to tolerate initiation of parenteral nutrition (met, advancing dextrose)

## 2021-01-13 ENCOUNTER — CLINICAL SUPPORT (OUTPATIENT)
Dept: URGENT CARE | Facility: CLINIC | Age: 31
End: 2021-01-13
Payer: MEDICAID

## 2021-01-13 DIAGNOSIS — Z78.9 NO KNOWN HEALTH PROBLEMS: Primary | ICD-10-CM

## 2021-01-13 LAB
CTP QC/QA: YES
SARS-COV-2 RDRP RESP QL NAA+PROBE: NEGATIVE

## 2021-01-13 PROCEDURE — 87635 SARS-COV-2 COVID-19 AMP PRB: CPT | Mod: QW,S$GLB,, | Performed by: PHYSICIAN ASSISTANT

## 2021-01-13 PROCEDURE — 87635: ICD-10-PCS | Mod: QW,S$GLB,, | Performed by: PHYSICIAN ASSISTANT

## 2021-01-13 PROCEDURE — 99211 PR OFFICE/OUTPT VISIT, EST, LEVL I: ICD-10-PCS | Mod: S$GLB,,, | Performed by: PHYSICIAN ASSISTANT

## 2021-01-13 PROCEDURE — 99211 OFF/OP EST MAY X REQ PHY/QHP: CPT | Mod: S$GLB,,, | Performed by: PHYSICIAN ASSISTANT

## 2021-01-25 ENCOUNTER — HOSPITAL ENCOUNTER (EMERGENCY)
Facility: OTHER | Age: 31
Discharge: HOME | End: 2021-01-25
Attending: EMERGENCY MEDICINE
Payer: MEDICAID

## 2021-01-25 VITALS
TEMPERATURE: 98 F | RESPIRATION RATE: 16 BRPM | HEIGHT: 67 IN | BODY MASS INDEX: 37.98 KG/M2 | OXYGEN SATURATION: 100 % | HEART RATE: 71 BPM | WEIGHT: 242 LBS | SYSTOLIC BLOOD PRESSURE: 119 MMHG | DIASTOLIC BLOOD PRESSURE: 72 MMHG

## 2021-01-25 DIAGNOSIS — L03.011 PARONYCHIA OF RIGHT INDEX FINGER: Primary | ICD-10-CM

## 2021-01-25 LAB
B-HCG UR QL: NEGATIVE
CTP QC/QA: YES

## 2021-01-25 PROCEDURE — 25000003 PHARM REV CODE 250: Performed by: EMERGENCY MEDICINE

## 2021-01-25 PROCEDURE — 99284 EMERGENCY DEPT VISIT MOD MDM: CPT | Mod: 25

## 2021-01-25 PROCEDURE — 81025 URINE PREGNANCY TEST: CPT | Performed by: EMERGENCY MEDICINE

## 2021-01-25 PROCEDURE — 10060 I&D ABSCESS SIMPLE/SINGLE: CPT

## 2021-01-25 RX ORDER — LEVOFLOXACIN 750 MG/1
750 TABLET ORAL DAILY
Qty: 4 TABLET | Refills: 0 | Status: SHIPPED | OUTPATIENT
Start: 2021-01-25 | End: 2021-01-29

## 2021-01-25 RX ORDER — NAPROXEN 500 MG/1
500 TABLET ORAL 2 TIMES DAILY PRN
Qty: 60 TABLET | Refills: 0 | Status: SHIPPED | OUTPATIENT
Start: 2021-01-25 | End: 2022-08-10

## 2021-01-25 RX ORDER — LEVOFLOXACIN 750 MG/1
750 TABLET ORAL
Status: COMPLETED | OUTPATIENT
Start: 2021-01-25 | End: 2021-01-25

## 2021-01-25 RX ORDER — LIDOCAINE HYDROCHLORIDE AND EPINEPHRINE 10; 10 MG/ML; UG/ML
10 INJECTION, SOLUTION INFILTRATION; PERINEURAL
Status: COMPLETED | OUTPATIENT
Start: 2021-01-25 | End: 2021-01-25

## 2021-01-25 RX ORDER — MUPIROCIN 20 MG/G
1 OINTMENT TOPICAL
Status: COMPLETED | OUTPATIENT
Start: 2021-01-25 | End: 2021-01-25

## 2021-01-25 RX ORDER — MUPIROCIN 20 MG/G
OINTMENT TOPICAL 3 TIMES DAILY
Qty: 15 G | Refills: 0 | Status: SHIPPED | OUTPATIENT
Start: 2021-01-25 | End: 2021-02-04

## 2021-01-25 RX ADMIN — LEVOFLOXACIN 750 MG: 750 TABLET, FILM COATED ORAL at 04:01

## 2021-01-25 RX ADMIN — MUPIROCIN 22 G: 20 OINTMENT TOPICAL at 04:01

## 2021-01-25 RX ADMIN — LIDOCAINE HYDROCHLORIDE,EPINEPHRINE BITARTRATE 10 ML: 10; .01 INJECTION, SOLUTION INFILTRATION; PERINEURAL at 04:01

## 2021-01-26 ENCOUNTER — TELEPHONE (OUTPATIENT)
Dept: INTERNAL MEDICINE | Facility: CLINIC | Age: 31
End: 2021-01-26

## 2021-02-09 ENCOUNTER — HOSPITAL ENCOUNTER (EMERGENCY)
Facility: OTHER | Age: 31
Discharge: HOME | End: 2021-02-09
Attending: EMERGENCY MEDICINE
Payer: MEDICAID

## 2021-02-09 VITALS
BODY MASS INDEX: 38.84 KG/M2 | WEIGHT: 248 LBS | SYSTOLIC BLOOD PRESSURE: 101 MMHG | RESPIRATION RATE: 20 BRPM | HEART RATE: 83 BPM | OXYGEN SATURATION: 98 % | DIASTOLIC BLOOD PRESSURE: 63 MMHG | TEMPERATURE: 99 F

## 2021-02-09 DIAGNOSIS — S29.9XXA CHEST WALL INJURY, INITIAL ENCOUNTER: ICD-10-CM

## 2021-02-09 DIAGNOSIS — S89.90XA KNEE INJURY, INITIAL ENCOUNTER: ICD-10-CM

## 2021-02-09 DIAGNOSIS — R40.20 LOC (LOSS OF CONSCIOUSNESS): ICD-10-CM

## 2021-02-09 DIAGNOSIS — A59.9 TRICHOMONAS INFECTION: ICD-10-CM
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
## 2021-02-09 LAB
B-HCG UR QL: NEGATIVE
BACTERIA #/AREA URNS HPF: ABNORMAL /HPF
BILIRUB UR QL STRIP: NEGATIVE
CLARITY UR: CLEAR
COLOR UR: YELLOW
CTP QC/QA: YES
GLUCOSE UR QL STRIP: NEGATIVE
HGB UR QL STRIP: NEGATIVE
KETONES UR QL STRIP: NEGATIVE
LEUKOCYTE ESTERASE UR QL STRIP: ABNORMAL
MICROSCOPIC COMMENT: ABNORMAL
NITRITE UR QL STRIP: NEGATIVE
PH UR STRIP: 6 [PH] (ref 5–8)
PROT UR QL STRIP: NEGATIVE
RBC #/AREA URNS HPF: 0 /HPF (ref 0–4)
SP GR UR STRIP: 1.02 (ref 1–1.03)
SQUAMOUS #/AREA URNS HPF: 40 /HPF
TRICHOMONAS UR QL MICRO: ABNORMAL
URN SPEC COLLECT METH UR: ABNORMAL
UROBILINOGEN UR STRIP-ACNC: NEGATIVE EU/DL
WBC #/AREA URNS HPF: 25 /HPF (ref 0–5)
WBC CLUMPS URNS QL MICRO: ABNORMAL

## 2021-02-09 PROCEDURE — 96372 THER/PROPH/DIAG INJ SC/IM: CPT

## 2021-02-09 PROCEDURE — 81000 URINALYSIS NONAUTO W/SCOPE: CPT

## 2021-02-09 PROCEDURE — 93010 ELECTROCARDIOGRAM REPORT: CPT | Mod: ,,, | Performed by: INTERNAL MEDICINE

## 2021-02-09 PROCEDURE — 25000003 PHARM REV CODE 250: Performed by: EMERGENCY MEDICINE

## 2021-02-09 PROCEDURE — 81025 URINE PREGNANCY TEST: CPT | Performed by: PHYSICIAN ASSISTANT

## 2021-02-09 PROCEDURE — 63600175 PHARM REV CODE 636 W HCPCS: Performed by: EMERGENCY MEDICINE

## 2021-02-09 PROCEDURE — 87086 URINE CULTURE/COLONY COUNT: CPT

## 2021-02-09 PROCEDURE — 99284 EMERGENCY DEPT VISIT MOD MDM: CPT | Mod: 25

## 2021-02-09 PROCEDURE — 93005 ELECTROCARDIOGRAM TRACING: CPT

## 2021-02-09 PROCEDURE — 93010 EKG 12-LEAD: ICD-10-PCS | Mod: ,,, | Performed by: INTERNAL MEDICINE

## 2021-02-09 RX ORDER — KETOROLAC TROMETHAMINE 30 MG/ML
30 INJECTION, SOLUTION INTRAMUSCULAR; INTRAVENOUS
Status: COMPLETED | OUTPATIENT
Start: 2021-02-09 | End: 2021-02-09

## 2021-02-09 RX ORDER — METRONIDAZOLE 500 MG/1
500 TABLET ORAL EVERY 12 HOURS
Qty: 14 TABLET | Refills: 0 | Status: SHIPPED | OUTPATIENT
Start: 2021-02-09 | End: 2021-02-16

## 2021-02-09 RX ORDER — METHOCARBAMOL 500 MG/1
1000 TABLET, FILM COATED ORAL 3 TIMES DAILY PRN
Qty: 30 TABLET | Refills: 0 | Status: SHIPPED | OUTPATIENT
Start: 2021-02-09 | End: 2021-02-11

## 2021-02-09 RX ORDER — IBUPROFEN 600 MG/1
600 TABLET ORAL EVERY 6 HOURS PRN
Qty: 20 TABLET | Refills: 0 | Status: SHIPPED | OUTPATIENT
Start: 2021-02-09

## 2021-02-09 RX ORDER — METHOCARBAMOL 500 MG/1
1000 TABLET, FILM COATED ORAL
Status: COMPLETED | OUTPATIENT
Start: 2021-02-09 | End: 2021-02-09

## 2021-02-09 RX ORDER — ONDANSETRON 4 MG/1
4 TABLET, ORALLY DISINTEGRATING ORAL
Status: COMPLETED | OUTPATIENT
Start: 2021-02-09 | End: 2021-02-09

## 2021-02-09 RX ORDER — ACETAMINOPHEN 325 MG/1
650 TABLET ORAL
Status: COMPLETED | OUTPATIENT
Start: 2021-02-09 | End: 2021-02-09

## 2021-02-09 RX ADMIN — KETOROLAC TROMETHAMINE 30 MG: 30 INJECTION, SOLUTION INTRAMUSCULAR at 07:02

## 2021-02-09 RX ADMIN — ACETAMINOPHEN 650 MG: 325 TABLET ORAL at 08:02

## 2021-02-09 RX ADMIN — ONDANSETRON 4 MG: 4 TABLET, ORALLY DISINTEGRATING ORAL at 07:02

## 2021-02-09 RX ADMIN — METHOCARBAMOL TABLETS 1000 MG: 500 TABLET, COATED ORAL at 08:02

## 2021-02-11 ENCOUNTER — TELEPHONE (OUTPATIENT)
Dept: URGENT CARE | Facility: CLINIC | Age: 31
End: 2021-02-11

## 2021-02-11 ENCOUNTER — OFFICE VISIT (OUTPATIENT)
Dept: URGENT CARE | Facility: CLINIC | Age: 31
End: 2021-02-11
Payer: MEDICAID

## 2021-02-11 VITALS
RESPIRATION RATE: 16 BRPM | WEIGHT: 243 LBS | BODY MASS INDEX: 38.14 KG/M2 | HEIGHT: 67 IN | TEMPERATURE: 98 F | DIASTOLIC BLOOD PRESSURE: 73 MMHG | HEART RATE: 71 BPM | OXYGEN SATURATION: 96 % | SYSTOLIC BLOOD PRESSURE: 106 MMHG

## 2021-02-11 DIAGNOSIS — M25.562 ACUTE PAIN OF LEFT KNEE: ICD-10-CM

## 2021-02-11 DIAGNOSIS — M79.18 MYOFASCIAL PAIN: ICD-10-CM

## 2021-02-11 DIAGNOSIS — E66.9 OBESITY (BMI 30-39.9): ICD-10-CM
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
## 2021-02-11 DIAGNOSIS — G44.209 ACUTE NON INTRACTABLE TENSION-TYPE HEADACHE: ICD-10-CM

## 2021-02-11 LAB
BACTERIA UR CULT: NORMAL
BACTERIA UR CULT: NORMAL

## 2021-02-11 PROCEDURE — 99213 PR OFFICE/OUTPT VISIT, EST, LEVL III, 20-29 MIN: ICD-10-PCS | Mod: 25,S$GLB,, | Performed by: PHYSICIAN ASSISTANT

## 2021-02-11 PROCEDURE — 99213 OFFICE O/P EST LOW 20 MIN: CPT | Mod: 25,S$GLB,, | Performed by: PHYSICIAN ASSISTANT

## 2021-02-11 RX ORDER — BUTALBITAL, ACETAMINOPHEN AND CAFFEINE 50; 325; 40 MG/1; MG/1; MG/1
1 TABLET ORAL
Qty: 30 TABLET | Refills: 0 | Status: SHIPPED | OUTPATIENT
Start: 2021-02-11 | End: 2021-03-13

## 2021-02-11 RX ORDER — CYCLOBENZAPRINE HCL 5 MG
5 TABLET ORAL 3 TIMES DAILY PRN
Qty: 30 TABLET | Refills: 0 | Status: SHIPPED | OUTPATIENT
Start: 2021-02-11

## 2021-02-11 RX ORDER — KETOROLAC TROMETHAMINE 30 MG/ML
30 INJECTION, SOLUTION INTRAMUSCULAR; INTRAVENOUS
Status: COMPLETED | OUTPATIENT
Start: 2021-02-11 | End: 2021-02-11

## 2021-02-11 RX ADMIN — KETOROLAC TROMETHAMINE 30 MG: 30 INJECTION, SOLUTION INTRAMUSCULAR; INTRAVENOUS at 01:02

## 2021-02-12 ENCOUNTER — TELEPHONE (OUTPATIENT)
Dept: URGENT CARE | Facility: CLINIC | Age: 31
End: 2021-02-12

## 2021-02-26 ENCOUNTER — OFFICE VISIT (OUTPATIENT)
Dept: OBSTETRICS AND GYNECOLOGY | Facility: CLINIC | Age: 31
End: 2021-02-26
Payer: MEDICAID

## 2021-02-26 VITALS
DIASTOLIC BLOOD PRESSURE: 72 MMHG | SYSTOLIC BLOOD PRESSURE: 108 MMHG | BODY MASS INDEX: 37.75 KG/M2 | WEIGHT: 240.5 LBS | HEIGHT: 67 IN

## 2021-02-26 DIAGNOSIS — N89.8 VAGINAL IRRITATION: Primary | ICD-10-CM

## 2021-02-26 DIAGNOSIS — L73.9 FOLLICULITIS: ICD-10-CM

## 2021-02-26 DIAGNOSIS — K64.9 HEMORRHOIDS, UNSPECIFIED HEMORRHOID TYPE: ICD-10-CM

## 2021-02-26 DIAGNOSIS — Z86.19 HISTORY OF POSITIVE PCR FOR HERPES SIMPLEX VIRUS TYPE 2 (HSV-2) DNA: ICD-10-CM

## 2021-02-26 PROCEDURE — 99999 PR PBB SHADOW E&M-EST. PATIENT-LVL III: CPT | Mod: PBBFAC,,, | Performed by: PHYSICIAN ASSISTANT

## 2021-02-26 PROCEDURE — 99214 PR OFFICE/OUTPT VISIT, EST, LEVL IV, 30-39 MIN: ICD-10-PCS | Mod: S$PBB,,, | Performed by: PHYSICIAN ASSISTANT

## 2021-02-26 PROCEDURE — 99999 PR PBB SHADOW E&M-EST. PATIENT-LVL III: ICD-10-PCS | Mod: PBBFAC,,, | Performed by: PHYSICIAN ASSISTANT

## 2021-02-26 PROCEDURE — 99213 OFFICE O/P EST LOW 20 MIN: CPT | Mod: PBBFAC | Performed by: PHYSICIAN ASSISTANT

## 2021-02-26 PROCEDURE — 99214 OFFICE O/P EST MOD 30 MIN: CPT | Mod: S$PBB,,, | Performed by: PHYSICIAN ASSISTANT

## 2021-02-26 RX ORDER — FLUCONAZOLE 150 MG/1
150 TABLET ORAL ONCE
Qty: 2 TABLET | Refills: 1 | Status: SHIPPED | OUTPATIENT
Start: 2021-02-26 | End: 2021-02-26

## 2021-02-26 RX ORDER — VALACYCLOVIR HYDROCHLORIDE 500 MG/1
500 TABLET, FILM COATED ORAL 2 TIMES DAILY
Qty: 10 TABLET | Refills: 3 | Status: SHIPPED | OUTPATIENT
Start: 2021-02-26 | End: 2021-07-01 | Stop reason: SDUPTHER

## 2021-03-30 ENCOUNTER — OFFICE VISIT (OUTPATIENT)
Dept: OBSTETRICS AND GYNECOLOGY | Facility: CLINIC | Age: 31
End: 2021-03-30
Payer: MEDICAID

## 2021-03-30 VITALS
SYSTOLIC BLOOD PRESSURE: 112 MMHG | WEIGHT: 247.38 LBS | BODY MASS INDEX: 38.83 KG/M2 | DIASTOLIC BLOOD PRESSURE: 80 MMHG | HEIGHT: 67 IN

## 2021-03-30 DIAGNOSIS — Z86.19 HISTORY OF TRICHOMONIASIS: ICD-10-CM

## 2021-03-30 DIAGNOSIS — Z11.3 SCREEN FOR STD (SEXUALLY TRANSMITTED DISEASE): ICD-10-CM

## 2021-03-30 DIAGNOSIS — Z01.419 WELL WOMAN EXAM WITH ROUTINE GYNECOLOGICAL EXAM: Primary | ICD-10-CM

## 2021-03-30 PROCEDURE — 88175 CYTOPATH C/V AUTO FLUID REDO: CPT | Performed by: OBSTETRICS & GYNECOLOGY

## 2021-03-30 PROCEDURE — 99999 PR PBB SHADOW E&M-EST. PATIENT-LVL III: CPT | Mod: PBBFAC,,, | Performed by: OBSTETRICS & GYNECOLOGY

## 2021-03-30 PROCEDURE — 99395 PR PREVENTIVE VISIT,EST,18-39: ICD-10-PCS | Mod: S$PBB,,, | Performed by: OBSTETRICS & GYNECOLOGY

## 2021-03-30 PROCEDURE — 99213 OFFICE O/P EST LOW 20 MIN: CPT | Mod: PBBFAC | Performed by: OBSTETRICS & GYNECOLOGY

## 2021-03-30 PROCEDURE — 87481 CANDIDA DNA AMP PROBE: CPT | Mod: 59 | Performed by: OBSTETRICS & GYNECOLOGY

## 2021-03-30 PROCEDURE — 99395 PREV VISIT EST AGE 18-39: CPT | Mod: S$PBB,,, | Performed by: OBSTETRICS & GYNECOLOGY

## 2021-03-30 PROCEDURE — 99999 PR PBB SHADOW E&M-EST. PATIENT-LVL III: ICD-10-PCS | Mod: PBBFAC,,, | Performed by: OBSTETRICS & GYNECOLOGY

## 2021-04-01 DIAGNOSIS — B96.89 BV (BACTERIAL VAGINOSIS): Primary | ICD-10-CM

## 2021-04-01 DIAGNOSIS — N76.0 BV (BACTERIAL VAGINOSIS): Primary | ICD-10-CM

## 2021-04-01 LAB
BACTERIAL VAGINOSIS DNA: POSITIVE
CANDIDA GLABRATA DNA: NEGATIVE
CANDIDA KRUSEI DNA: NEGATIVE
CANDIDA RRNA VAG QL PROBE: NEGATIVE
T VAGINALIS RRNA GENITAL QL PROBE: NEGATIVE

## 2021-04-01 RX ORDER — METRONIDAZOLE 500 MG/1
500 TABLET ORAL EVERY 12 HOURS
Qty: 14 TABLET | Refills: 0 | Status: SHIPPED | OUTPATIENT
Start: 2021-04-01 | End: 2021-04-08

## 2021-04-06 LAB
CLINICAL INFO: NORMAL
CYTO CVX: NORMAL
CYTOLOGIST CVX/VAG CYTO: NORMAL
CYTOLOGY CMNT CVX/VAG CYTO-IMP: NORMAL
CYTOLOGY PAP THIN PREP EXPLANATION: NORMAL
DATE OF PREVIOUS PAP: NORMAL
DATE PREVIOUS BX: NO
LMP START DATE: NORMAL
MICROORGANISM CVX/VAG CYTO: NORMAL
SPECIMEN SOURCE CVX/VAG CYTO: NORMAL
STAT OF ADQ CVX/VAG CYTO-IMP: NORMAL

## 2021-06-10 ENCOUNTER — HOSPITAL ENCOUNTER (EMERGENCY)
Facility: OTHER | Age: 31
Discharge: HOME | End: 2021-06-10
Attending: EMERGENCY MEDICINE
Payer: MEDICAID

## 2021-06-10 VITALS
SYSTOLIC BLOOD PRESSURE: 120 MMHG | BODY MASS INDEX: 39.86 KG/M2 | TEMPERATURE: 98 F | WEIGHT: 248 LBS | RESPIRATION RATE: 16 BRPM | HEART RATE: 83 BPM | HEIGHT: 66 IN | DIASTOLIC BLOOD PRESSURE: 82 MMHG | OXYGEN SATURATION: 96 %
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
## 2021-06-10 DIAGNOSIS — S53.402A SPRAIN OF LEFT ELBOW, INITIAL ENCOUNTER: ICD-10-CM

## 2021-06-10 DIAGNOSIS — S63.502A SPRAIN OF LEFT WRIST, INITIAL ENCOUNTER: Primary | ICD-10-CM

## 2021-06-10 LAB
B-HCG UR QL: NEGATIVE
CTP QC/QA: YES
HCV AB SERPL QL IA: NEGATIVE
HIV 1+2 AB+HIV1 P24 AG SERPL QL IA: NEGATIVE

## 2021-06-10 PROCEDURE — 99284 EMERGENCY DEPT VISIT MOD MDM: CPT | Mod: 25

## 2021-06-10 PROCEDURE — 81025 URINE PREGNANCY TEST: CPT | Performed by: NURSE PRACTITIONER

## 2021-06-10 PROCEDURE — 25000003 PHARM REV CODE 250: Performed by: EMERGENCY MEDICINE

## 2021-06-10 PROCEDURE — 86703 HIV-1/HIV-2 1 RESULT ANTBDY: CPT | Performed by: EMERGENCY MEDICINE

## 2021-06-10 PROCEDURE — 29105 APPLICATION LONG ARM SPLINT: CPT | Mod: LT

## 2021-06-10 PROCEDURE — 25000003 PHARM REV CODE 250: Performed by: NURSE PRACTITIONER

## 2021-06-10 PROCEDURE — 86803 HEPATITIS C AB TEST: CPT | Performed by: EMERGENCY MEDICINE

## 2021-06-10 RX ORDER — ACETAMINOPHEN 325 MG/1
650 TABLET ORAL
Status: COMPLETED | OUTPATIENT
Start: 2021-06-10 | End: 2021-06-10

## 2021-06-10 RX ORDER — KETOROLAC TROMETHAMINE 30 MG/ML
10 INJECTION, SOLUTION INTRAMUSCULAR; INTRAVENOUS
Status: DISCONTINUED | OUTPATIENT
Start: 2021-06-10 | End: 2021-06-10

## 2021-06-10 RX ORDER — KETOROLAC TROMETHAMINE 10 MG/1
10 TABLET, FILM COATED ORAL
Status: COMPLETED | OUTPATIENT
Start: 2021-06-10 | End: 2021-06-10

## 2021-06-10 RX ORDER — NAPROXEN 500 MG/1
500 TABLET ORAL 2 TIMES DAILY WITH MEALS
Qty: 8 TABLET | Refills: 0 | Status: SHIPPED | OUTPATIENT
Start: 2021-06-10 | End: 2021-06-14

## 2021-06-10 RX ADMIN — KETOROLAC TROMETHAMINE 10 MG: 10 TABLET, FILM COATED ORAL at 10:06

## 2021-06-10 RX ADMIN — ACETAMINOPHEN 650 MG: 325 TABLET, FILM COATED ORAL at 09:06

## 2021-06-11 ENCOUNTER — TELEPHONE (OUTPATIENT)
Dept: ORTHOPEDICS | Facility: CLINIC | Age: 31
End: 2021-06-11

## 2021-06-11 ENCOUNTER — PES CALL (OUTPATIENT)
Dept: ADMINISTRATIVE | Facility: CLINIC | Age: 31
End: 2021-06-11

## 2021-06-15 ENCOUNTER — OFFICE VISIT (OUTPATIENT)
Dept: ORTHOPEDICS | Facility: CLINIC | Age: 31
End: 2021-06-15
Payer: MEDICAID

## 2021-06-15 VITALS
RESPIRATION RATE: 18 BRPM | HEIGHT: 66 IN | BODY MASS INDEX: 40.82 KG/M2 | HEART RATE: 77 BPM | SYSTOLIC BLOOD PRESSURE: 112 MMHG | DIASTOLIC BLOOD PRESSURE: 79 MMHG | WEIGHT: 254 LBS

## 2021-06-15 DIAGNOSIS — S63.502A SPRAIN OF LEFT WRIST, INITIAL ENCOUNTER: ICD-10-CM

## 2021-06-15 DIAGNOSIS — S52.125A CLOSED NONDISPLACED FRACTURE OF HEAD OF LEFT RADIUS, INITIAL ENCOUNTER: ICD-10-CM

## 2021-06-15 PROCEDURE — 99999 PR PBB SHADOW E&M-EST. PATIENT-LVL III: CPT | Mod: PBBFAC,,, | Performed by: ORTHOPAEDIC SURGERY

## 2021-06-15 PROCEDURE — 99203 PR OFFICE/OUTPT VISIT, NEW, LEVL III, 30-44 MIN: ICD-10-PCS | Mod: S$PBB,,, | Performed by: ORTHOPAEDIC SURGERY

## 2021-06-15 PROCEDURE — 99203 OFFICE O/P NEW LOW 30 MIN: CPT | Mod: S$PBB,,, | Performed by: ORTHOPAEDIC SURGERY

## 2021-06-15 PROCEDURE — 99213 OFFICE O/P EST LOW 20 MIN: CPT | Mod: PBBFAC,PN | Performed by: ORTHOPAEDIC SURGERY

## 2021-06-15 PROCEDURE — 99999 PR PBB SHADOW E&M-EST. PATIENT-LVL III: ICD-10-PCS | Mod: PBBFAC,,, | Performed by: ORTHOPAEDIC SURGERY

## 2021-06-15 RX ORDER — FLUCONAZOLE 150 MG/1
TABLET ORAL
COMMUNITY
Start: 2021-02-26

## 2021-06-30 ENCOUNTER — PATIENT OUTREACH (OUTPATIENT)
Dept: EMERGENCY MEDICINE | Facility: OTHER | Age: 31
End: 2021-06-30

## 2021-06-30 ENCOUNTER — TELEPHONE (OUTPATIENT)
Dept: INTERNAL MEDICINE | Facility: CLINIC | Age: 31
End: 2021-06-30

## 2021-07-01 DIAGNOSIS — Z86.19 HISTORY OF POSITIVE PCR FOR HERPES SIMPLEX VIRUS TYPE 2 (HSV-2) DNA: ICD-10-CM

## 2021-07-01 RX ORDER — VALACYCLOVIR HYDROCHLORIDE 500 MG/1
500 TABLET, FILM COATED ORAL 2 TIMES DAILY
Qty: 10 TABLET | Refills: 3 | Status: SHIPPED | OUTPATIENT
Start: 2021-07-01 | End: 2022-03-30 | Stop reason: SDUPTHER

## 2021-07-02 ENCOUNTER — TELEPHONE (OUTPATIENT)
Dept: INTERNAL MEDICINE | Facility: CLINIC | Age: 31
End: 2021-07-02

## 2021-07-14 ENCOUNTER — PATIENT OUTREACH (OUTPATIENT)
Dept: EMERGENCY MEDICINE | Facility: OTHER | Age: 31
End: 2021-07-14

## 2021-12-18 ENCOUNTER — CLINICAL SUPPORT (OUTPATIENT)
Dept: URGENT CARE | Facility: CLINIC | Age: 31
End: 2021-12-18
Payer: MEDICAID

## 2021-12-18 DIAGNOSIS — Z20.822 ENCOUNTER FOR LABORATORY TESTING FOR COVID-19 VIRUS: Primary | ICD-10-CM

## 2021-12-18 LAB
CTP QC/QA: YES
SARS-COV-2 RDRP RESP QL NAA+PROBE: NEGATIVE

## 2021-12-18 PROCEDURE — U0002: ICD-10-PCS | Mod: QW,S$GLB,, | Performed by: NURSE PRACTITIONER

## 2021-12-18 PROCEDURE — U0002 COVID-19 LAB TEST NON-CDC: HCPCS | Mod: QW,S$GLB,, | Performed by: NURSE PRACTITIONER

## 2022-04-05 ENCOUNTER — OFFICE VISIT (OUTPATIENT)
Dept: OBSTETRICS AND GYNECOLOGY | Facility: CLINIC | Age: 32
End: 2022-04-05
Payer: MEDICAID

## 2022-04-05 ENCOUNTER — LAB VISIT (OUTPATIENT)
Dept: LAB | Facility: OTHER | Age: 32
End: 2022-04-05
Attending: OBSTETRICS & GYNECOLOGY
Payer: MEDICAID

## 2022-04-05 VITALS
BODY MASS INDEX: 38.9 KG/M2 | SYSTOLIC BLOOD PRESSURE: 118 MMHG | DIASTOLIC BLOOD PRESSURE: 80 MMHG | WEIGHT: 242.06 LBS | HEIGHT: 66 IN

## 2022-04-05 DIAGNOSIS — N92.6 IRREGULAR MENSES: ICD-10-CM

## 2022-04-05 DIAGNOSIS — Z01.419 WELL WOMAN EXAM WITH ROUTINE GYNECOLOGICAL EXAM: Primary | ICD-10-CM

## 2022-04-05 LAB — TSH SERPL DL<=0.005 MIU/L-ACNC: 1.22 UIU/ML (ref 0.4–4)

## 2022-04-05 PROCEDURE — 1159F PR MEDICATION LIST DOCUMENTED IN MEDICAL RECORD: ICD-10-PCS | Mod: CPTII,,, | Performed by: OBSTETRICS & GYNECOLOGY

## 2022-04-05 PROCEDURE — 36415 COLL VENOUS BLD VENIPUNCTURE: CPT | Performed by: OBSTETRICS & GYNECOLOGY

## 2022-04-05 PROCEDURE — 99213 OFFICE O/P EST LOW 20 MIN: CPT | Mod: PBBFAC | Performed by: OBSTETRICS & GYNECOLOGY

## 2022-04-05 PROCEDURE — 99999 PR PBB SHADOW E&M-EST. PATIENT-LVL III: ICD-10-PCS | Mod: PBBFAC,,, | Performed by: OBSTETRICS & GYNECOLOGY

## 2022-04-05 PROCEDURE — 1160F PR REVIEW ALL MEDS BY PRESCRIBER/CLIN PHARMACIST DOCUMENTED: ICD-10-PCS | Mod: CPTII,,, | Performed by: OBSTETRICS & GYNECOLOGY

## 2022-04-05 PROCEDURE — 3008F BODY MASS INDEX DOCD: CPT | Mod: CPTII,,, | Performed by: OBSTETRICS & GYNECOLOGY

## 2022-04-05 PROCEDURE — 3074F PR MOST RECENT SYSTOLIC BLOOD PRESSURE < 130 MM HG: ICD-10-PCS | Mod: CPTII,,, | Performed by: OBSTETRICS & GYNECOLOGY

## 2022-04-05 PROCEDURE — 1160F RVW MEDS BY RX/DR IN RCRD: CPT | Mod: CPTII,,, | Performed by: OBSTETRICS & GYNECOLOGY

## 2022-04-05 PROCEDURE — 1159F MED LIST DOCD IN RCRD: CPT | Mod: CPTII,,, | Performed by: OBSTETRICS & GYNECOLOGY

## 2022-04-05 PROCEDURE — 99395 PR PREVENTIVE VISIT,EST,18-39: ICD-10-PCS | Mod: S$PBB,,, | Performed by: OBSTETRICS & GYNECOLOGY

## 2022-04-05 PROCEDURE — 3008F PR BODY MASS INDEX (BMI) DOCUMENTED: ICD-10-PCS | Mod: CPTII,,, | Performed by: OBSTETRICS & GYNECOLOGY

## 2022-04-05 PROCEDURE — 99395 PREV VISIT EST AGE 18-39: CPT | Mod: S$PBB,,, | Performed by: OBSTETRICS & GYNECOLOGY

## 2022-04-05 PROCEDURE — 3079F PR MOST RECENT DIASTOLIC BLOOD PRESSURE 80-89 MM HG: ICD-10-PCS | Mod: CPTII,,, | Performed by: OBSTETRICS & GYNECOLOGY

## 2022-04-05 PROCEDURE — 3079F DIAST BP 80-89 MM HG: CPT | Mod: CPTII,,, | Performed by: OBSTETRICS & GYNECOLOGY

## 2022-04-05 PROCEDURE — 99999 PR PBB SHADOW E&M-EST. PATIENT-LVL III: CPT | Mod: PBBFAC,,, | Performed by: OBSTETRICS & GYNECOLOGY

## 2022-04-05 PROCEDURE — 84443 ASSAY THYROID STIM HORMONE: CPT | Performed by: OBSTETRICS & GYNECOLOGY

## 2022-04-05 PROCEDURE — 3074F SYST BP LT 130 MM HG: CPT | Mod: CPTII,,, | Performed by: OBSTETRICS & GYNECOLOGY

## 2022-04-05 NOTE — PROGRESS NOTES
History & Physical  Gynecology      SUBJECTIVE:     Chief Complaint: Well Woman (Irregular periods after vaccine)       History of Present Illness:  Annual Exam-Premenopausal  Patient presents for annual exam. The patient has no complaints today. Menstrual cycles have been a little irregular for the few months after her vaccine in September.  February and March have been normal.  She is waiting to see what will happen in April.  The patient is sexually active. GYN screening history: last pap: approximate date  and was normal. The patient participates in regular exercise: no.  Smoking status:  no    Contraception: none, desires pregnancy    FH:  Breast cancer: neg  Colon cancer: dad, age age 45  Ovarian cancer: neg    Review of patient's allergies indicates:   Allergen Reactions    Ceftriaxone Edema    Percocet [oxycodone-acetaminophen]        Past Medical History:   Diagnosis Date    Allergy     Recurrent upper respiratory infection (URI)      Past Surgical History:   Procedure Laterality Date    NO PAST SURGERIES       OB History        0    Para   0    Term   0       0    AB   0    Living   0       SAB   0    IAB   0    Ectopic   0    Multiple   0    Live Births                   Family History   Problem Relation Age of Onset    Colon cancer Father     Cancer Father 46        colon ca    Diabetes Mother     Hypertension Mother     Breast cancer Neg Hx     Eclampsia Neg Hx     Miscarriages / Stillbirths Neg Hx     Ovarian cancer Neg Hx      labor Neg Hx     Stroke Neg Hx     Cervical cancer Neg Hx     Vaginal cancer Neg Hx     Endometrial cancer Neg Hx      Social History     Tobacco Use    Smoking status: Never Smoker    Smokeless tobacco: Never Used   Substance Use Topics    Alcohol use: Yes     Comment: social    Drug use: No       Current Outpatient Medications   Medication Sig    albuterol (PROVENTIL/VENTOLIN HFA) 90 mcg/actuation inhaler Inhale 1-2 puffs into  the lungs every 6 (six) hours as needed for Wheezing or Shortness of Breath. Rescue    azelastine (ASTELIN) 137 mcg (0.1 %) nasal spray 1-2 sprays (137-274 mcg total) by Nasal route 2 (two) times daily as needed for Rhinitis.    cetirizine (ZYRTEC) 10 MG tablet Take 10 mg by mouth once daily.    fluticasone propionate (FLONASE) 50 mcg/actuation nasal spray 1 spray (50 mcg total) by Each Nostril route 2 (two) times daily as needed.    valACYclovir (VALTREX) 500 MG tablet Take 1 tablet (500 mg total) by mouth 2 (two) times daily. for 5 days    azithromycin (Z-PATRICIA) 250 MG tablet Take 1 tablet (250 mg total) by mouth once daily. Take first 2 tablets together, then 1 every day until finished. (Patient not taking: Reported on 2/11/2021)    cyclobenzaprine (FLEXERIL) 5 MG tablet Take 1 tablet (5 mg total) by mouth 3 (three) times daily as needed.    diphenhydrAMINE (BENADRYL) 12.5 mg/5 mL elixir Take by mouth every 8 (eight) hours.    fluconazole (DIFLUCAN) 150 MG Tab TAKE 1 TABLET BY MOUTH ONCE. REFILL AND REDOSE IF SYMPTOMS RECUR FOR 1 DOSE    ibuprofen (ADVIL,MOTRIN) 600 MG tablet Take 1 tablet (600 mg total) by mouth every 6 (six) hours as needed for Pain. (Patient not taking: Reported on 6/15/2021)    naproxen (NAPROSYN) 500 MG tablet Take 1 tablet (500 mg total) by mouth 2 (two) times daily as needed (pain).     No current facility-administered medications for this visit.         Review of Systems:  Review of Systems   Constitutional: Negative for activity change, appetite change and fever.   Respiratory: Negative for shortness of breath.    Cardiovascular: Negative for chest pain.   Gastrointestinal: Negative for abdominal pain, nausea and vomiting.   Genitourinary: Positive for menstrual problem. Negative for menorrhagia, pelvic pain, vaginal bleeding, vaginal discharge and vaginal pain.   Integumentary:  Negative for breast mass.   Breast: Negative for lump and mass       OBJECTIVE:     Physical  Exam:  Physical Exam  Vitals and nursing note reviewed.   Constitutional:       Appearance: She is well-developed.   Neck:      Thyroid: No thyromegaly.      Trachea: No tracheal deviation.   Cardiovascular:      Rate and Rhythm: Normal rate and regular rhythm.      Heart sounds: Normal heart sounds.   Pulmonary:      Effort: Pulmonary effort is normal.      Breath sounds: Normal breath sounds.   Chest:   Breasts: Breasts are symmetrical.      Right: Inverted nipple (not new) present. No mass, nipple discharge, skin change or tenderness.      Left: Inverted nipple (not new) present. No mass, nipple discharge, skin change or tenderness.         Abdominal:      Palpations: Abdomen is soft.   Genitourinary:     General: Normal vulva.      Labia:         Right: No rash, tenderness, lesion or injury.         Left: No rash, tenderness, lesion or injury.       Urethra: No prolapse, urethral pain, urethral swelling or urethral lesion.      Vagina: Normal. No signs of injury and foreign body. No vaginal discharge, erythema, tenderness or bleeding.      Cervix: No cervical motion tenderness, discharge or friability.      Uterus: Not deviated, not enlarged, not fixed and not tender.       Adnexa:         Right: No mass, tenderness or fullness.          Left: No mass, tenderness or fullness.        Rectum: No anal fissure or external hemorrhoid.      Comments: Urethral meatus: normal size, anterior vaginal wall with no prolapse, no lesions  Bladder: no fullness, masses or tenderness  Musculoskeletal:      Cervical back: Normal range of motion and neck supple.   Neurological:      Mental Status: She is alert and oriented to person, place, and time.   Psychiatric:         Behavior: Behavior normal.         Thought Content: Thought content normal.         Judgment: Judgment normal.         Chaperoned by: Shakila    ASSESSMENT:       ICD-10-CM ICD-9-CM    1. Well woman exam with routine gynecological exam  Z01.419 V72.31    2.  Irregular menses  N92.6 626.4 TSH          Plan:      Purvi was seen today for well woman.    Diagnoses and all orders for this visit:    Well woman exam with routine gynecological exam    Irregular menses  -     TSH; Future        Orders Placed This Encounter   Procedures    TSH       Well Woman:  - Pap smear up to date  - Birth control: PNV; medications reviewed for safety in pregnancy  - GC/CT:n/a  - Mammogram: due age 41; monitor small breast cyst; notify me if no change  - iscussed colonoscopy at age 36 or 10 years prior then dad's age of diagnosis;  - Smoking cessation: n/a  - Labs: TSH today  - Vaccines: gardasil completed  - Exercise counseling  - keep period calendar, notify me if cycles do not return to normal    Follow up in one year for annual or prn.    Yulisa Craven

## 2022-05-02 ENCOUNTER — LAB VISIT (OUTPATIENT)
Dept: LAB | Facility: OTHER | Age: 32
End: 2022-05-02
Attending: STUDENT IN AN ORGANIZED HEALTH CARE EDUCATION/TRAINING PROGRAM
Payer: MEDICAID

## 2022-05-02 ENCOUNTER — OFFICE VISIT (OUTPATIENT)
Dept: INTERNAL MEDICINE | Facility: CLINIC | Age: 32
End: 2022-05-02
Payer: MEDICAID

## 2022-05-02 VITALS
WEIGHT: 247.56 LBS | DIASTOLIC BLOOD PRESSURE: 78 MMHG | OXYGEN SATURATION: 98 % | HEART RATE: 98 BPM | SYSTOLIC BLOOD PRESSURE: 110 MMHG | BODY MASS INDEX: 39.79 KG/M2 | HEIGHT: 66 IN

## 2022-05-02 DIAGNOSIS — M25.531 RIGHT WRIST PAIN: ICD-10-CM

## 2022-05-02 DIAGNOSIS — E66.09 CLASS 2 OBESITY DUE TO EXCESS CALORIES WITHOUT SERIOUS COMORBIDITY WITH BODY MASS INDEX (BMI) OF 39.0 TO 39.9 IN ADULT: ICD-10-CM

## 2022-05-02 DIAGNOSIS — N63.0 BREAST MASS: ICD-10-CM

## 2022-05-02 DIAGNOSIS — M79.644 PAIN OF RIGHT THUMB: ICD-10-CM

## 2022-05-02 DIAGNOSIS — D75.839 THROMBOCYTHEMIA: ICD-10-CM

## 2022-05-02 DIAGNOSIS — Z00.00 ANNUAL PHYSICAL EXAM: ICD-10-CM

## 2022-05-02 DIAGNOSIS — Z00.00 ANNUAL PHYSICAL EXAM: Primary | ICD-10-CM

## 2022-05-02 LAB
ALBUMIN SERPL BCP-MCNC: 3.6 G/DL (ref 3.5–5.2)
ALP SERPL-CCNC: 85 U/L (ref 55–135)
ALT SERPL W/O P-5'-P-CCNC: 13 U/L (ref 10–44)
ANION GAP SERPL CALC-SCNC: 7 MMOL/L (ref 8–16)
AST SERPL-CCNC: 15 U/L (ref 10–40)
BASOPHILS # BLD AUTO: 0.01 K/UL (ref 0–0.2)
BASOPHILS NFR BLD: 0.1 % (ref 0–1.9)
BILIRUB SERPL-MCNC: 0.3 MG/DL (ref 0.1–1)
BUN SERPL-MCNC: 7 MG/DL (ref 6–20)
CALCIUM SERPL-MCNC: 9.6 MG/DL (ref 8.7–10.5)
CHLORIDE SERPL-SCNC: 105 MMOL/L (ref 95–110)
CHOLEST SERPL-MCNC: 147 MG/DL (ref 120–199)
CHOLEST/HDLC SERPL: 3.5 {RATIO} (ref 2–5)
CO2 SERPL-SCNC: 25 MMOL/L (ref 23–29)
CREAT SERPL-MCNC: 0.8 MG/DL (ref 0.5–1.4)
DIFFERENTIAL METHOD: ABNORMAL
EOSINOPHIL # BLD AUTO: 0 K/UL (ref 0–0.5)
EOSINOPHIL NFR BLD: 0.5 % (ref 0–8)
ERYTHROCYTE [DISTWIDTH] IN BLOOD BY AUTOMATED COUNT: 13.4 % (ref 11.5–14.5)
EST. GFR  (AFRICAN AMERICAN): >60 ML/MIN/1.73 M^2
EST. GFR  (NON AFRICAN AMERICAN): >60 ML/MIN/1.73 M^2
ESTIMATED AVG GLUCOSE: 100 MG/DL (ref 68–131)
GLUCOSE SERPL-MCNC: 78 MG/DL (ref 70–110)
HBA1C MFR BLD: 5.1 % (ref 4–5.6)
HCT VFR BLD AUTO: 39.1 % (ref 37–48.5)
HDLC SERPL-MCNC: 42 MG/DL (ref 40–75)
HDLC SERPL: 28.6 % (ref 20–50)
HGB BLD-MCNC: 12.1 G/DL (ref 12–16)
IMM GRANULOCYTES # BLD AUTO: 0.03 K/UL (ref 0–0.04)
IMM GRANULOCYTES NFR BLD AUTO: 0.4 % (ref 0–0.5)
LDLC SERPL CALC-MCNC: 90 MG/DL (ref 63–159)
LYMPHOCYTES # BLD AUTO: 2.4 K/UL (ref 1–4.8)
LYMPHOCYTES NFR BLD: 31.6 % (ref 18–48)
MCH RBC QN AUTO: 29.8 PG (ref 27–31)
MCHC RBC AUTO-ENTMCNC: 30.9 G/DL (ref 32–36)
MCV RBC AUTO: 96 FL (ref 82–98)
MONOCYTES # BLD AUTO: 0.4 K/UL (ref 0.3–1)
MONOCYTES NFR BLD: 5.6 % (ref 4–15)
NEUTROPHILS # BLD AUTO: 4.7 K/UL (ref 1.8–7.7)
NEUTROPHILS NFR BLD: 61.8 % (ref 38–73)
NONHDLC SERPL-MCNC: 105 MG/DL
NRBC BLD-RTO: 0 /100 WBC
PLATELET # BLD AUTO: 342 K/UL (ref 150–450)
PMV BLD AUTO: 10.3 FL (ref 9.2–12.9)
POTASSIUM SERPL-SCNC: 3.9 MMOL/L (ref 3.5–5.1)
PROT SERPL-MCNC: 7.4 G/DL (ref 6–8.4)
RBC # BLD AUTO: 4.06 M/UL (ref 4–5.4)
SODIUM SERPL-SCNC: 137 MMOL/L (ref 136–145)
TRIGL SERPL-MCNC: 75 MG/DL (ref 30–150)
WBC # BLD AUTO: 7.65 K/UL (ref 3.9–12.7)

## 2022-05-02 PROCEDURE — 83036 HEMOGLOBIN GLYCOSYLATED A1C: CPT | Performed by: STUDENT IN AN ORGANIZED HEALTH CARE EDUCATION/TRAINING PROGRAM

## 2022-05-02 PROCEDURE — 3008F PR BODY MASS INDEX (BMI) DOCUMENTED: ICD-10-PCS | Mod: CPTII,,, | Performed by: STUDENT IN AN ORGANIZED HEALTH CARE EDUCATION/TRAINING PROGRAM

## 2022-05-02 PROCEDURE — 3074F PR MOST RECENT SYSTOLIC BLOOD PRESSURE < 130 MM HG: ICD-10-PCS | Mod: CPTII,,, | Performed by: STUDENT IN AN ORGANIZED HEALTH CARE EDUCATION/TRAINING PROGRAM

## 2022-05-02 PROCEDURE — 99999 PR PBB SHADOW E&M-EST. PATIENT-LVL V: ICD-10-PCS | Mod: PBBFAC,,, | Performed by: STUDENT IN AN ORGANIZED HEALTH CARE EDUCATION/TRAINING PROGRAM

## 2022-05-02 PROCEDURE — 1159F PR MEDICATION LIST DOCUMENTED IN MEDICAL RECORD: ICD-10-PCS | Mod: CPTII,,, | Performed by: STUDENT IN AN ORGANIZED HEALTH CARE EDUCATION/TRAINING PROGRAM

## 2022-05-02 PROCEDURE — 85025 COMPLETE CBC W/AUTO DIFF WBC: CPT | Performed by: STUDENT IN AN ORGANIZED HEALTH CARE EDUCATION/TRAINING PROGRAM

## 2022-05-02 PROCEDURE — 3078F DIAST BP <80 MM HG: CPT | Mod: CPTII,,, | Performed by: STUDENT IN AN ORGANIZED HEALTH CARE EDUCATION/TRAINING PROGRAM

## 2022-05-02 PROCEDURE — 99395 PREV VISIT EST AGE 18-39: CPT | Mod: S$PBB,,, | Performed by: STUDENT IN AN ORGANIZED HEALTH CARE EDUCATION/TRAINING PROGRAM

## 2022-05-02 PROCEDURE — 99215 OFFICE O/P EST HI 40 MIN: CPT | Mod: PBBFAC | Performed by: STUDENT IN AN ORGANIZED HEALTH CARE EDUCATION/TRAINING PROGRAM

## 2022-05-02 PROCEDURE — 99395 PR PREVENTIVE VISIT,EST,18-39: ICD-10-PCS | Mod: S$PBB,,, | Performed by: STUDENT IN AN ORGANIZED HEALTH CARE EDUCATION/TRAINING PROGRAM

## 2022-05-02 PROCEDURE — 3074F SYST BP LT 130 MM HG: CPT | Mod: CPTII,,, | Performed by: STUDENT IN AN ORGANIZED HEALTH CARE EDUCATION/TRAINING PROGRAM

## 2022-05-02 PROCEDURE — 99999 PR PBB SHADOW E&M-EST. PATIENT-LVL V: CPT | Mod: PBBFAC,,, | Performed by: STUDENT IN AN ORGANIZED HEALTH CARE EDUCATION/TRAINING PROGRAM

## 2022-05-02 PROCEDURE — 80061 LIPID PANEL: CPT | Performed by: STUDENT IN AN ORGANIZED HEALTH CARE EDUCATION/TRAINING PROGRAM

## 2022-05-02 PROCEDURE — 1159F MED LIST DOCD IN RCRD: CPT | Mod: CPTII,,, | Performed by: STUDENT IN AN ORGANIZED HEALTH CARE EDUCATION/TRAINING PROGRAM

## 2022-05-02 PROCEDURE — 3008F BODY MASS INDEX DOCD: CPT | Mod: CPTII,,, | Performed by: STUDENT IN AN ORGANIZED HEALTH CARE EDUCATION/TRAINING PROGRAM

## 2022-05-02 PROCEDURE — 36415 COLL VENOUS BLD VENIPUNCTURE: CPT | Performed by: STUDENT IN AN ORGANIZED HEALTH CARE EDUCATION/TRAINING PROGRAM

## 2022-05-02 PROCEDURE — 80053 COMPREHEN METABOLIC PANEL: CPT | Performed by: STUDENT IN AN ORGANIZED HEALTH CARE EDUCATION/TRAINING PROGRAM

## 2022-05-02 PROCEDURE — 3078F PR MOST RECENT DIASTOLIC BLOOD PRESSURE < 80 MM HG: ICD-10-PCS | Mod: CPTII,,, | Performed by: STUDENT IN AN ORGANIZED HEALTH CARE EDUCATION/TRAINING PROGRAM

## 2022-05-02 NOTE — PROGRESS NOTES
" [facility]    Subjective:       Patient ID: Purvi Ann is a 31 y.o. female.    Chief Complaint: Annual Exam    History was obtained from the patient and supplemented through chart review.  This pt is known to me.    HPI:    Patient is a 31 y.o. female presents for annual. She was lost to f/u in interim    Has been Stress eating; lost weight, regained    Exercise: hx of back and forth dieting  Encouraged more healthy habits in general  Thinking ETOH is main source of calories currentrly    Right handed thumb pain/wrist  chornic  Wearing a brace, icing  Already taking occasional naproxen, ibupfroen  Worse with driving, typing  Sometimes "shakes" hands to get rid of pain  PT ordered  Wait on EMG now; hesitant due to pain  Referral to ortho for potential injections     Did not address   Constipation  Not drinking much water  Struggles with pain    Wt Readings from Last 15 Encounters:   05/02/22 112.3 kg (247 lb 9.2 oz)   04/05/22 109.8 kg (242 lb 1 oz)   06/15/21 115.2 kg (253 lb 15.5 oz)   06/10/21 112.5 kg (248 lb)   03/30/21 112.2 kg (247 lb 5.7 oz)   02/26/21 109.1 kg (240 lb 8.4 oz)   02/11/21 110.2 kg (243 lb)   02/09/21 112.5 kg (248 lb)   01/25/21 109.8 kg (242 lb)   10/07/20 113.9 kg (251 lb 1.7 oz)   09/23/20 112 kg (246 lb 14.6 oz)   09/16/20 112.5 kg (248 lb)   06/23/20 108.9 kg (240 lb)   01/25/20 107 kg (236 lb)   12/28/19 104.3 kg (230 lb)        In school, stress eat  Not sleeping well    Medical History  Past Medical History:   Diagnosis Date    Allergy     Recurrent upper respiratory infection (URI)        Review of Systems   Constitutional: Negative for appetite change, diaphoresis and fever.   HENT: Negative for rhinorrhea and trouble swallowing.    Respiratory: Negative for shortness of breath.    Cardiovascular: Negative for chest pain.   Gastrointestinal: Negative for diarrhea, nausea and vomiting.   Genitourinary: Negative for hematuria.        Left breast "cyst" per " gyn, pt concerned   Musculoskeletal: Positive for arthralgias and myalgias. Negative for gait problem.   Neurological: Negative for dizziness, seizures and weakness.   Psychiatric/Behavioral: Negative for hallucinations. The patient is not nervous/anxious.          Surgical hx, family hx, social hx   Dad- with coon cancer age 46  MOM- DM, HTN  ETOH: 3/evening x weekends only, 1 if driving  Sex: men none now  Have been reviewed      Current Outpatient Medications:     albuterol (PROVENTIL/VENTOLIN HFA) 90 mcg/actuation inhaler, Inhale 1-2 puffs into the lungs every 6 (six) hours as needed for Wheezing or Shortness of Breath. Rescue, Disp: 6.7 g, Rfl: 0    cetirizine (ZYRTEC) 10 MG tablet, Take 10 mg by mouth once daily., Disp: , Rfl:     cyclobenzaprine (FLEXERIL) 5 MG tablet, Take 1 tablet (5 mg total) by mouth 3 (three) times daily as needed., Disp: 30 tablet, Rfl: 0    diphenhydrAMINE (BENADRYL) 12.5 mg/5 mL elixir, Take by mouth every 8 (eight) hours., Disp: , Rfl:     fluticasone propionate (FLONASE) 50 mcg/actuation nasal spray, 1 spray (50 mcg total) by Each Nostril route 2 (two) times daily as needed., Disp: 15 g, Rfl: 0    naproxen (NAPROSYN) 500 MG tablet, Take 1 tablet (500 mg total) by mouth 2 (two) times daily as needed (pain)., Disp: 60 tablet, Rfl: 0    azelastine (ASTELIN) 137 mcg (0.1 %) nasal spray, 1-2 sprays (137-274 mcg total) by Nasal route 2 (two) times daily as needed for Rhinitis. (Patient not taking: Reported on 5/2/2022), Disp: 30 mL, Rfl: 5    azithromycin (Z-PATRICIA) 250 MG tablet, Take 1 tablet (250 mg total) by mouth once daily. Take first 2 tablets together, then 1 every day until finished. (Patient not taking: No sig reported), Disp: 6 tablet, Rfl: 0    fluconazole (DIFLUCAN) 150 MG Tab, TAKE 1 TABLET BY MOUTH ONCE. REFILL AND REDOSE IF SYMPTOMS RECUR FOR 1 DOSE, Disp: , Rfl:     ibuprofen (ADVIL,MOTRIN) 600 MG tablet, Take 1 tablet (600 mg total) by mouth every 6 (six) hours as  "needed for Pain. (Patient not taking: No sig reported), Disp: 20 tablet, Rfl: 0    valACYclovir (VALTREX) 500 MG tablet, Take 1 tablet (500 mg total) by mouth 2 (two) times daily. for 5 days, Disp: 30 tablet, Rfl: 3    Objective:        Body mass index is 39.96 kg/m².  Vitals:    05/02/22 0942   BP: 110/78   Pulse: 98   SpO2: 98%   Weight: 112.3 kg (247 lb 9.2 oz)   Height: 5' 6" (1.676 m)   PainSc: 0-No pain     Physical Exam  Vitals and nursing note reviewed.   Constitutional:       General: She is not in acute distress.     Appearance: Normal appearance. She is well-developed. She is not diaphoretic.      Comments: Obese   HENT:      Head: Normocephalic and atraumatic.   Eyes:      Conjunctiva/sclera: Conjunctivae normal.      Pupils: Pupils are equal, round, and reactive to light.   Neck:      Vascular: No JVD.   Cardiovascular:      Rate and Rhythm: Normal rate and regular rhythm.      Heart sounds: Normal heart sounds. No murmur heard.  Pulmonary:      Effort: Pulmonary effort is normal. No respiratory distress.      Breath sounds: Normal breath sounds. No wheezing or rales.   Abdominal:      General: Bowel sounds are normal. There is no distension.      Palpations: Abdomen is soft. There is no mass.      Tenderness: There is no abdominal tenderness.   Musculoskeletal:         General: Normal range of motion.      Cervical back: Normal range of motion and neck supple.      Comments: lef superior lateral breast mass   Skin:     General: Skin is warm and dry.      Capillary Refill: Capillary refill takes less than 2 seconds.   Neurological:      Mental Status: She is alert and oriented to person, place, and time.      Cranial Nerves: No cranial nerve deficit.   Psychiatric:         Behavior: Behavior normal.           Lab Results   Component Value Date    WBC 7.65 05/02/2022    HGB 12.1 05/02/2022    HCT 39.1 05/02/2022     05/02/2022    CHOL 163 10/07/2020    TRIG 56 10/07/2020    HDL 42 10/07/2020    " ALT 13 05/02/2022    AST 15 05/02/2022     05/02/2022    K 3.9 05/02/2022     05/02/2022    CREATININE 0.8 05/02/2022    BUN 7 05/02/2022    CO2 25 05/02/2022    TSH 1.223 04/05/2022    HGBA1C 5.0 12/12/2018       The ASCVD Risk score (Crewe KENNETH Jr., et al., 2013) failed to calculate for the following reasons:    The 2013 ASCVD risk score is only valid for ages 40 to 79    (Imaging have been independently reviewed)  CT abdom pelvis performed in ER June 2020    Reviewed myself- no obstruction, no obvious inflammation     Crenated or involuting left ovarian cyst.  Small free pelvic fluid.    Submucosal fat in the right colonic wall.  Differential considerations  may include chronic ulcerative colitis, chronic Crohn's, obesity,  celiac disease, or other etiology.    Mild indistinct fat about a distal ileal loop and mild thickening of the  associated ileal wall, which may be due to under distension and or  enteritis.    Elongated right lobe.    Assessment:         1. Annual physical exam    2. Breast mass    3. Right wrist pain    4. Pain of right thumb    5. Class 2 obesity due to excess calories without serious comorbidity with body mass index (BMI) of 39.0 to 39.9 in adult    6. Thrombocythemia          Plan:     Purvi was seen today for annual exam.    Diagnoses and all orders for this visit:    Annual physical exam  -     Comprehensive Metabolic Panel; Future  -     Lipid Panel; Future  -     CBC Auto Differential; Future    Breast mass  -     US Breast Left Limited; Future  -     Mammo Digital Diagnostic Bilat with [place]ger; Future    Right wrist pain  -     Ambulatory referral/consult to Physical/Occupational Therapy; Future  -     Ambulatory referral/consult to Sports Medicine; Future    Pain of right thumb  -     Ambulatory referral/consult to Physical/Occupational Therapy; Future  -     Ambulatory referral/consult to Sports Medicine; Future    Class 2 obesity due to excess calories without serious  comorbidity with body mass index (BMI) of 39.0 to 39.9 in adult  -     Hemoglobin A1C; Future    Thrombocythemia  -     CBC Auto Differential; Future        Health Maintenance  - Lipids: normal 10/2020  - A1C: fasting glucose 87 5/2/2022  - Colon Ca Screen: N/A due to age  - Immunizations: refused    Women's health  - Pap: [place] NILM and neg HPV 3/30/2021, + BV   - Contraception:     Follow up in about 3 months (around 8/2/2022).    for weight, wellness f/u    All medications were reviewed including potential side effects and risks/benefits.  Pt was counseled to call back if anything worsens or if questions arise.    Waqas Humphries MD  Family Medicine  [facility]  [facility] [address] [address]  Phone [phone]  Fax [phone]

## 2022-05-13 ENCOUNTER — DOCUMENTATION ONLY (OUTPATIENT)
Dept: REHABILITATION | Facility: HOSPITAL | Age: 32
End: 2022-05-13
Payer: MEDICAID

## 2022-05-13 NOTE — PROGRESS NOTES
NO Show or Cancellation Documentation only     Patient:  Purvi Ann  Date of visit: 5/13/2022  MRN: [id number]    Purvi Ann did not show up for his/her OT Hand Therapy Appointment as scheduled today.  They did not call or cancel their appointment prior to today.  No charges were posted this date.      VARINDER Wong, ZITAT

## 2022-05-18 ENCOUNTER — DOCUMENTATION ONLY (OUTPATIENT)
Dept: REHABILITATION | Facility: HOSPITAL | Age: 32
End: 2022-05-18
Payer: MEDICAID

## 2022-05-18 NOTE — PROGRESS NOTES
Occupational Therapy: No show  Date: 05/18/2022    Patient was a no show to today's OT appointment. Purvi Ann did not call to cancel nor reschedule. This is the 2nd appointment that the patient has not attended. No charges have been posted today. Patient's next scheduled appointment is not scheduled yet for OT evaluation.    Cancel: 0  No show: 2    Therapist: Shlomo Swenson OT

## 2022-07-13 ENCOUNTER — OFFICE VISIT (OUTPATIENT)
Dept: URGENT CARE | Facility: CLINIC | Age: 32
End: 2022-07-13
Payer: MEDICAID

## 2022-07-13 VITALS
RESPIRATION RATE: 20 BRPM | HEART RATE: 88 BPM | HEIGHT: 66 IN | TEMPERATURE: 97 F | SYSTOLIC BLOOD PRESSURE: 107 MMHG | DIASTOLIC BLOOD PRESSURE: 75 MMHG | WEIGHT: 247 LBS | OXYGEN SATURATION: 98 % | BODY MASS INDEX: 39.7 KG/M2

## 2022-07-13 DIAGNOSIS — R05.9 COUGH: ICD-10-CM

## 2022-07-13 DIAGNOSIS — U07.1 COVID-19 VIRUS DETECTED: Primary | ICD-10-CM

## 2022-07-13 LAB
CTP QC/QA: YES
SARS-COV-2 RDRP RESP QL NAA+PROBE: POSITIVE

## 2022-07-13 PROCEDURE — 3078F PR MOST RECENT DIASTOLIC BLOOD PRESSURE < 80 MM HG: ICD-10-PCS | Mod: CPTII,S$GLB,, | Performed by: NURSE PRACTITIONER

## 2022-07-13 PROCEDURE — 1159F MED LIST DOCD IN RCRD: CPT | Mod: CPTII,S$GLB,, | Performed by: NURSE PRACTITIONER

## 2022-07-13 PROCEDURE — U0002: ICD-10-PCS | Mod: QW,S$GLB,, | Performed by: NURSE PRACTITIONER

## 2022-07-13 PROCEDURE — 3044F HG A1C LEVEL LT 7.0%: CPT | Mod: CPTII,S$GLB,, | Performed by: NURSE PRACTITIONER

## 2022-07-13 PROCEDURE — 1159F PR MEDICATION LIST DOCUMENTED IN MEDICAL RECORD: ICD-10-PCS | Mod: CPTII,S$GLB,, | Performed by: NURSE PRACTITIONER

## 2022-07-13 PROCEDURE — 3008F BODY MASS INDEX DOCD: CPT | Mod: CPTII,S$GLB,, | Performed by: NURSE PRACTITIONER

## 2022-07-13 PROCEDURE — 3074F SYST BP LT 130 MM HG: CPT | Mod: CPTII,S$GLB,, | Performed by: NURSE PRACTITIONER

## 2022-07-13 PROCEDURE — 3078F DIAST BP <80 MM HG: CPT | Mod: CPTII,S$GLB,, | Performed by: NURSE PRACTITIONER

## 2022-07-13 PROCEDURE — 99214 OFFICE O/P EST MOD 30 MIN: CPT | Mod: S$GLB,,, | Performed by: NURSE PRACTITIONER

## 2022-07-13 PROCEDURE — 3044F PR MOST RECENT HEMOGLOBIN A1C LEVEL <7.0%: ICD-10-PCS | Mod: CPTII,S$GLB,, | Performed by: NURSE PRACTITIONER

## 2022-07-13 PROCEDURE — 99214 PR OFFICE/OUTPT VISIT, EST, LEVL IV, 30-39 MIN: ICD-10-PCS | Mod: S$GLB,,, | Performed by: NURSE PRACTITIONER

## 2022-07-13 PROCEDURE — 3074F PR MOST RECENT SYSTOLIC BLOOD PRESSURE < 130 MM HG: ICD-10-PCS | Mod: CPTII,S$GLB,, | Performed by: NURSE PRACTITIONER

## 2022-07-13 PROCEDURE — U0002 COVID-19 LAB TEST NON-CDC: HCPCS | Mod: QW,S$GLB,, | Performed by: NURSE PRACTITIONER

## 2022-07-13 PROCEDURE — 3008F PR BODY MASS INDEX (BMI) DOCUMENTED: ICD-10-PCS | Mod: CPTII,S$GLB,, | Performed by: NURSE PRACTITIONER

## 2022-07-13 NOTE — PROGRESS NOTES
"Subjective:       Patient ID: Purvi Ann is a 31 y.o. female.    Vitals:  height is 5' 6" (1.676 m) and weight is 112 kg (247 lb). Her temperature is 97.2 °F (36.2 °C). Her blood pressure is 107/75 and her pulse is 88. Her respiration is 20 and oxygen saturation is 98%.     Chief Complaint: Cough    31 yr old female presenting to the Urgent Care with complaint of productive cough and headache x 2 days. Patient reports covid exposure per family members. Patient is fully vaccinated. Patient denies any CP, SOB, abdominal pain or fever at this time.     Cough  This is a new problem. The current episode started in the past 7 days. The problem has been gradually worsening. The problem occurs constantly. The cough is productive of sputum. Pertinent negatives include no chest pain, chills, ear congestion, ear pain, fever, headaches, heartburn, hemoptysis, myalgias, nasal congestion, postnasal drip, rash, rhinorrhea, sore throat, shortness of breath, sweats, weight loss or wheezing. Treatments tried: theraflu.       Constitution: Negative for chills, sweating, fatigue and fever.   HENT: Negative for ear pain, postnasal drip and sore throat.    Neck: Negative for neck pain.   Cardiovascular: Negative for chest pain and sob on exertion.   Respiratory: Positive for cough. Negative for sputum production, bloody sputum, shortness of breath and wheezing.    Gastrointestinal: Negative for abdominal pain and heartburn.   Musculoskeletal: Negative for muscle ache.   Skin: Negative for rash.   Neurological: Negative for headaches and altered mental status.   Psychiatric/Behavioral: Negative for altered mental status.       Objective:      Physical Exam   Constitutional: She is cooperative.  Non-toxic appearance. She does not appear ill.   HENT:   Head: Normocephalic and atraumatic.   Ears:   Right Ear: Hearing, tympanic membrane, external ear and ear canal normal.   Left Ear: Hearing, tympanic membrane, external ear and ear " canal normal.   Nose: Nose normal.   Mouth/Throat: Uvula is midline, oropharynx is clear and moist and mucous membranes are normal.   Eyes: Extraocular movement intact   Cardiovascular: Normal rate, normal heart sounds and normal pulses.   Pulmonary/Chest: Effort normal and breath sounds normal.   Abdominal: Normal appearance.   Skin: Skin is not diaphoretic.   Psychiatric: She experiences Normal attention. Mood normal.   Nursing note and vitals reviewed.        Assessment:       1. COVID-19 virus detected    2. Cough        Results for orders placed or performed in visit on 07/13/22   POCT COVID-19 Rapid Screening   Result Value Ref Range    POC Rapid COVID Positive (A) Negative     Acceptable Yes        Plan:     Patient tested positive for COVID-19 and due to low risk factors for progression to severe disease, Paxlovid (nirmatrelvir-ritonavir) was not rx. Discussed in length about OTC symptomatic treatment. Strict ER precautions discussed. Patient verbalized understanding and agreed with tx plan. Patient noted to be stable upon discharge.     COVID-19 virus detected    Cough  -     POCT COVID-19 Rapid Screening      Patient Instructions   Instructions for Patients with Confirmed or Suspected COVID-19     Stay home and stay away from family members and friends. The CDC says, you can leave home after these three things have happened: 1) You have had no fever for at least 72 hours (that is three full days of no fever without the use of medicine that reduces fevers) 2) AND other symptoms have improved (for example, when your cough or shortness of breath have improved) 3) AND at least 5 days have passed since your symptoms first appeared.   Separate yourself from other people and animals in your home.   Call ahead before visiting your doctor.   Wear a facemask.   Cover your coughs and sneezes.   Wash your hands often with soap and water; hand  can be used, too.   Avoid sharing personal  household items.   Wipe down surfaces used daily.   Monitor your symptoms. Seek prompt medical attention if your illness is worsening (e.g., difficulty breathing).    Before seeking care, call your healthcare provider.   If you have a medical emergency and need to call 911, notify the dispatch personnel that you have, or are being evaluated for COVID-19. If possible, put on a facemask before emergency medical services arrive.        Recommended precautions for household members, intimate partners, and caregivers in a home setting of a patient with symptomatic laboratory-confirmed COVID-19 or a patient under investigation.  Household members, intimate partners, and caregivers in the home setting awaiting tests results have close contact with a person with symptomatic, laboratory-confirmed COVID-19 or a person under investigation. Close contacts should monitor their health; they should call their provider right away if they develop symptoms suggestive of COVID-19 (e.g., fever, cough, shortness of breath).    Close contacts should also follow these recommendations:   Make sure that you understand and can help the patient follow their provider's instructions for medication(s) and care. You should help the patient with basic needs in the home and provide support for getting groceries, prescriptions, and other personal needs.   Monitor the patient's symptoms. If the patient is getting sicker, call his or her healthcare provider and tell them that the patient has laboratory-confirmed COVID-19. If the patient has a medical emergency and you need to call 911, notify the dispatch personnel that the patient has, or is being evaluated for COVID-19.   Household members should stay in another room or be  from the patient. Household members should use a separate bedroom and bathroom, if available.   Prohibit visitors.   Household members should care for any pets in the home.   Make sure that shared spaces in the  home have good air flow, such as by an air conditioner or an opened window, weather permitting.   Perform hand hygiene frequently. Wash your hands often with soap and water for at least 20 seconds or use an alcohol-based hand  (that contains > 60% alcohol) covering all surfaces of your hands and rubbing them together until they feel dry. Soap and water should be used preferentially.   Avoid touching your eyes, nose, and mouth.   The patient should wear a facemask. If the patient is not able to wear a facemask (for example, because it causes trouble breathing), caregivers should wear a mask when they are in the same room as the patient.   Wear a disposable facemask and gloves when you touch or have contact with the patient's blood, stool, or body fluids, such as saliva, sputum, nasal mucus, vomit, urine.  o Throw out disposable facemasks and gloves after using them. Do not reuse.  o When removing personal protective equipment, first remove and dispose of gloves. Then, immediately clean your hands with soap and water or alcohol-based hand . Next, remove and dispose of facemask, and immediately clean your hands again with soap and water or alcohol-based hand .   You should not share dishes, drinking glasses, cups, eating utensils, towels, bedding, or other items with the patient. After the patient uses these items, you should wash them thoroughly (see below Wash laundry thoroughly).   Clean all high-touch surfaces, such as counters, tabletops, doorknobs, bathroom fixtures, toilets, phones, keyboards, tablets, and bedside tables, every day. Also, clean any surfaces that may have blood, stool, or body fluids on them.   Use a household cleaning spray or wipe, according to the label instructions. Labels contain instructions for safe and effective use of the cleaning product including precautions you should take when applying the product, such as wearing gloves and making sure you have  good ventilation during use of the product.   Wash laundry thoroughly.  o Immediately remove and wash clothes or bedding that have blood, stool, or body fluids on them.  o Wear disposable gloves while handling soiled items and keep soiled items away from your body. Clean your hands (with soap and water or an alcohol-based hand ) immediately after removing your gloves.  o Read and follow directions on labels of laundry or clothing items and detergent. In general, using a normal laundry detergent according to washing machine instructions and dry thoroughly using the warmest temperatures recommended on the clothing label.   Place all used disposable gloves, facemasks, and other contaminated items in a lined container before disposing of them with other household waste. Clean your hands (with soap and water or an alcohol-based hand ) immediately after handling these items. Soap and water should be used preferentially if hands are visibly dirty.   Discuss any additional questions with your state or local health department or healthcare provider. Check available hours when contacting your local health department.    For more information see CDC link below.      https://www.cdc.gov/coronavirus/2019-ncov/hcp/guidance-prevent-spread.html#precautions        Sources:  [facility], [facility] and [facility] [place]    If you were prescribed a narcotic or controlled medication, do not drive or operate heavy equipment or machinery while taking these medications.  You must understand that you've received an Urgent Care treatment only and that you may be released before all your medical problems are known or treated. You, the patient, will arrange for follow up care as instructed.  Follow up with your PCP or specialty clinic as directed within 2-5 days if not improved or as needed.  You can call [phone] to schedule an appointment with the appropriate provider.  If your condition worsens we recommend  that you receive another evaluation at the emergency room immediately or contact your primary medical clinics after hours call service to discuss your concerns.  Please return here or go to the Emergency Department for any concerns or worsening of condition.

## 2022-07-13 NOTE — PATIENT INSTRUCTIONS
Instructions for Patients with Confirmed or Suspected COVID-19    Stay home and stay away from family members and friends. The CDC says, you can leave home after these three things have happened: 1) You have had no fever for at least 72 hours (that is three full days of no fever without the use of medicine that reduces fevers) 2) AND other symptoms have improved (for example, when your cough or shortness of breath have improved) 3) AND at least 5 days have passed since your symptoms first appeared.  Separate yourself from other people and animals in your home.  Call ahead before visiting your doctor.  Wear a facemask.  Cover your coughs and sneezes.  Wash your hands often with soap and water; hand  can be used, too.  Avoid sharing personal household items.  Wipe down surfaces used daily.  Monitor your symptoms. Seek prompt medical attention if your illness is worsening (e.g., difficulty breathing).   Before seeking care, call your healthcare provider.  If you have a medical emergency and need to call 911, notify the dispatch personnel that you have, or are being evaluated for COVID-19. If possible, put on a facemask before emergency medical services arrive.        Recommended precautions for household members, intimate partners, and caregivers in a home setting of a patient with symptomatic laboratory-confirmed COVID-19 or a patient under investigation.  Household members, intimate partners, and caregivers in the home setting awaiting tests results have close contact with a person with symptomatic, laboratory-confirmed COVID-19 or a person under investigation. Close contacts should monitor their health; they should call their provider right away if they develop symptoms suggestive of COVID-19 (e.g., fever, cough, shortness of breath).    Close contacts should also follow these recommendations:  Make sure that you understand and can help the patient follow their provider's instructions for medication(s) and  care. You should help the patient with basic needs in the home and provide support for getting groceries, prescriptions, and other personal needs.  Monitor the patient's symptoms. If the patient is getting sicker, call his or her healthcare provider and tell them that the patient has laboratory-confirmed COVID-19. If the patient has a medical emergency and you need to call 911, notify the dispatch personnel that the patient has, or is being evaluated for COVID-19.  Household members should stay in another room or be  from the patient. Household members should use a separate bedroom and bathroom, if available.  Prohibit visitors.  Household members should care for any pets in the home.  Make sure that shared spaces in the home have good air flow, such as by an air conditioner or an opened window, weather permitting.  Perform hand hygiene frequently. Wash your hands often with soap and water for at least 20 seconds or use an alcohol-based hand  (that contains > 60% alcohol) covering all surfaces of your hands and rubbing them together until they feel dry. Soap and water should be used preferentially.  Avoid touching your eyes, nose, and mouth.  The patient should wear a facemask. If the patient is not able to wear a facemask (for example, because it causes trouble breathing), caregivers should wear a mask when they are in the same room as the patient.  Wear a disposable facemask and gloves when you touch or have contact with the patient's blood, stool, or body fluids, such as saliva, sputum, nasal mucus, vomit, urine.  Throw out disposable facemasks and gloves after using them. Do not reuse.  When removing personal protective equipment, first remove and dispose of gloves. Then, immediately clean your hands with soap and water or alcohol-based hand . Next, remove and dispose of facemask, and immediately clean your hands again with soap and water or alcohol-based hand .  You should  not share dishes, drinking glasses, cups, eating utensils, towels, bedding, or other items with the patient. After the patient uses these items, you should wash them thoroughly (see below Wash laundry thoroughly).  Clean all high-touch surfaces, such as counters, tabletops, doorknobs, bathroom fixtures, toilets, phones, keyboards, tablets, and bedside tables, every day. Also, clean any surfaces that may have blood, stool, or body fluids on them.  Use a household cleaning spray or wipe, according to the label instructions. Labels contain instructions for safe and effective use of the cleaning product including precautions you should take when applying the product, such as wearing gloves and making sure you have good ventilation during use of the product.  Wash laundry thoroughly.  Immediately remove and wash clothes or bedding that have blood, stool, or body fluids on them.  Wear disposable gloves while handling soiled items and keep soiled items away from your body. Clean your hands (with soap and water or an alcohol-based hand ) immediately after removing your gloves.  Read and follow directions on labels of laundry or clothing items and detergent. In general, using a normal laundry detergent according to washing machine instructions and dry thoroughly using the warmest temperatures recommended on the clothing label.  Place all used disposable gloves, facemasks, and other contaminated items in a lined container before disposing of them with other household waste. Clean your hands (with soap and water or an alcohol-based hand ) immediately after handling these items. Soap and water should be used preferentially if hands are visibly dirty.  Discuss any additional questions with your state or local health department or healthcare provider. Check available hours when contacting your local health department.    For more information see CDC link below.       https://www.cdc.gov/coronavirus/2019-ncov/hcp/guidance-prevent-spread.html#precautions        Sources:  [facility], [place] [facility] and [facility]    If you were prescribed a narcotic or controlled medication, do not drive or operate heavy equipment or machinery while taking these medications.  You must understand that you've received an Urgent Care treatment only and that you may be released before all your medical problems are known or treated. You, the patient, will arrange for follow up care as instructed.  Follow up with your PCP or specialty clinic as directed within 2-5 days if not improved or as needed.  You can call [phone] to schedule an appointment with the appropriate provider.  If your condition worsens we recommend that you receive another evaluation at the emergency room immediately or contact your primary medical clinics after hours call service to discuss your concerns.  Please return here or go to the Emergency Department for any concerns or worsening of condition.

## 2022-07-13 NOTE — PROGRESS NOTES
Subjective:       Patient ID: Purvi Ann is a 31 y.o. female.    Chief Complaint: No chief complaint on file.    HPI  ROS     Objective:      Physical Exam    Assessment:       No diagnosis found.    Plan:                   No follow-ups on file.

## 2022-07-13 NOTE — LETTER
[address] [facility] ? [place], [address] ? Phone [phone] ? Fax [phone]           Return to Work/School    Patient: Purvi Ann  YOB: 1990   Date: 07/13/2022      To Whom It May Concern:     Purvi Ann was in contact with/seen in my office on 07/13/2022. COVID-19 is present in our communities across the [facility]. Not all patients are eligible or appropriate to be tested. In this situation, your employee meets the following criteria:     Purvi Ann has met the criteria for COVID-19 testing and has a POSITIVE result. She can return to work once they are asymptomatic for 24 hours without the use of fever reducing medications AND at least five days from the start of symptoms (or from the first positive result if they have no symptoms).      If you have any questions or concerns, or if I can be of further assistance, please do not hesitate to contact me.     Sincerely,    Anne-Marie Salazar NP

## 2022-08-10 ENCOUNTER — OFFICE VISIT (OUTPATIENT)
Dept: INTERNAL MEDICINE | Facility: CLINIC | Age: 32
End: 2022-08-10
Payer: MEDICAID

## 2022-08-10 VITALS
WEIGHT: 243.81 LBS | BODY MASS INDEX: 39.18 KG/M2 | HEART RATE: 83 BPM | HEIGHT: 66 IN | SYSTOLIC BLOOD PRESSURE: 111 MMHG | OXYGEN SATURATION: 98 % | DIASTOLIC BLOOD PRESSURE: 72 MMHG

## 2022-08-10 DIAGNOSIS — K59.00 CONSTIPATION, UNSPECIFIED CONSTIPATION TYPE: ICD-10-CM

## 2022-08-10 DIAGNOSIS — E66.9 OBESITY (BMI 35.0-39.9 WITHOUT COMORBIDITY): ICD-10-CM

## 2022-08-10 DIAGNOSIS — M25.512 ACUTE PAIN OF LEFT SHOULDER: Primary | ICD-10-CM

## 2022-08-10 DIAGNOSIS — F51.01 PRIMARY INSOMNIA: ICD-10-CM

## 2022-08-10 PROCEDURE — 3044F PR MOST RECENT HEMOGLOBIN A1C LEVEL <7.0%: ICD-10-PCS | Mod: CPTII,,, | Performed by: STUDENT IN AN ORGANIZED HEALTH CARE EDUCATION/TRAINING PROGRAM

## 2022-08-10 PROCEDURE — 99214 OFFICE O/P EST MOD 30 MIN: CPT | Mod: S$PBB,,, | Performed by: STUDENT IN AN ORGANIZED HEALTH CARE EDUCATION/TRAINING PROGRAM

## 2022-08-10 PROCEDURE — 99214 PR OFFICE/OUTPT VISIT, EST, LEVL IV, 30-39 MIN: ICD-10-PCS | Mod: S$PBB,,, | Performed by: STUDENT IN AN ORGANIZED HEALTH CARE EDUCATION/TRAINING PROGRAM

## 2022-08-10 PROCEDURE — 99999 PR PBB SHADOW E&M-EST. PATIENT-LVL IV: CPT | Mod: PBBFAC,,, | Performed by: STUDENT IN AN ORGANIZED HEALTH CARE EDUCATION/TRAINING PROGRAM

## 2022-08-10 PROCEDURE — 99999 PR PBB SHADOW E&M-EST. PATIENT-LVL IV: ICD-10-PCS | Mod: PBBFAC,,, | Performed by: STUDENT IN AN ORGANIZED HEALTH CARE EDUCATION/TRAINING PROGRAM

## 2022-08-10 PROCEDURE — 3078F DIAST BP <80 MM HG: CPT | Mod: CPTII,,, | Performed by: STUDENT IN AN ORGANIZED HEALTH CARE EDUCATION/TRAINING PROGRAM

## 2022-08-10 PROCEDURE — 3044F HG A1C LEVEL LT 7.0%: CPT | Mod: CPTII,,, | Performed by: STUDENT IN AN ORGANIZED HEALTH CARE EDUCATION/TRAINING PROGRAM

## 2022-08-10 PROCEDURE — 99214 OFFICE O/P EST MOD 30 MIN: CPT | Mod: PBBFAC | Performed by: STUDENT IN AN ORGANIZED HEALTH CARE EDUCATION/TRAINING PROGRAM

## 2022-08-10 PROCEDURE — 1159F PR MEDICATION LIST DOCUMENTED IN MEDICAL RECORD: ICD-10-PCS | Mod: CPTII,,, | Performed by: STUDENT IN AN ORGANIZED HEALTH CARE EDUCATION/TRAINING PROGRAM

## 2022-08-10 PROCEDURE — 3008F PR BODY MASS INDEX (BMI) DOCUMENTED: ICD-10-PCS | Mod: CPTII,,, | Performed by: STUDENT IN AN ORGANIZED HEALTH CARE EDUCATION/TRAINING PROGRAM

## 2022-08-10 PROCEDURE — 3074F SYST BP LT 130 MM HG: CPT | Mod: CPTII,,, | Performed by: STUDENT IN AN ORGANIZED HEALTH CARE EDUCATION/TRAINING PROGRAM

## 2022-08-10 PROCEDURE — 3078F PR MOST RECENT DIASTOLIC BLOOD PRESSURE < 80 MM HG: ICD-10-PCS | Mod: CPTII,,, | Performed by: STUDENT IN AN ORGANIZED HEALTH CARE EDUCATION/TRAINING PROGRAM

## 2022-08-10 PROCEDURE — 1159F MED LIST DOCD IN RCRD: CPT | Mod: CPTII,,, | Performed by: STUDENT IN AN ORGANIZED HEALTH CARE EDUCATION/TRAINING PROGRAM

## 2022-08-10 PROCEDURE — 3074F PR MOST RECENT SYSTOLIC BLOOD PRESSURE < 130 MM HG: ICD-10-PCS | Mod: CPTII,,, | Performed by: STUDENT IN AN ORGANIZED HEALTH CARE EDUCATION/TRAINING PROGRAM

## 2022-08-10 PROCEDURE — 3008F BODY MASS INDEX DOCD: CPT | Mod: CPTII,,, | Performed by: STUDENT IN AN ORGANIZED HEALTH CARE EDUCATION/TRAINING PROGRAM

## 2022-08-10 RX ORDER — TRAZODONE HYDROCHLORIDE 50 MG/1
TABLET ORAL
Qty: 60 TABLET | Refills: 3 | Status: SHIPPED | OUTPATIENT
Start: 2022-08-10 | End: 2023-08-17

## 2022-08-10 RX ORDER — POLYETHYLENE GLYCOL 3350 17 G/17G
17 POWDER, FOR SOLUTION ORAL DAILY
Qty: 100 EACH | Refills: 3 | Status: SHIPPED | OUTPATIENT
Start: 2022-08-10 | End: 2023-10-30

## 2022-08-10 NOTE — PATIENT INSTRUCTIONS
Maintenance   Take miralax (generic is fine)  once or twice a day      Clean Out  Take magnesium citrate for clean  Or Take half bottle of miralax with 32 oz gatorade.   Justin Mckeon to help  Milk of magnesiua
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
## 2022-12-01 ENCOUNTER — OFFICE VISIT (OUTPATIENT)
Dept: OBSTETRICS AND GYNECOLOGY | Facility: CLINIC | Age: 32
End: 2022-12-01
Payer: MEDICAID

## 2022-12-01 VITALS — HEIGHT: 66 IN | WEIGHT: 224 LBS | BODY MASS INDEX: 36 KG/M2

## 2022-12-01 DIAGNOSIS — A60.9 HSV (HERPES SIMPLEX VIRUS) ANOGENITAL INFECTION: Primary | ICD-10-CM

## 2022-12-01 PROCEDURE — 99213 OFFICE O/P EST LOW 20 MIN: CPT | Mod: PBBFAC | Performed by: OBSTETRICS & GYNECOLOGY

## 2022-12-01 PROCEDURE — 3008F BODY MASS INDEX DOCD: CPT | Mod: CPTII,,, | Performed by: OBSTETRICS & GYNECOLOGY

## 2022-12-01 PROCEDURE — 99213 PR OFFICE/OUTPT VISIT, EST, LEVL III, 20-29 MIN: ICD-10-PCS | Mod: S$PBB,,, | Performed by: OBSTETRICS & GYNECOLOGY

## 2022-12-01 PROCEDURE — 99213 OFFICE O/P EST LOW 20 MIN: CPT | Mod: S$PBB,,, | Performed by: OBSTETRICS & GYNECOLOGY

## 2022-12-01 PROCEDURE — 3044F PR MOST RECENT HEMOGLOBIN A1C LEVEL <7.0%: ICD-10-PCS | Mod: CPTII,,, | Performed by: OBSTETRICS & GYNECOLOGY

## 2022-12-01 PROCEDURE — 99999 PR PBB SHADOW E&M-EST. PATIENT-LVL III: CPT | Mod: PBBFAC,,, | Performed by: OBSTETRICS & GYNECOLOGY

## 2022-12-01 PROCEDURE — 3044F HG A1C LEVEL LT 7.0%: CPT | Mod: CPTII,,, | Performed by: OBSTETRICS & GYNECOLOGY

## 2022-12-01 PROCEDURE — 81514 NFCT DS BV&VAGINITIS DNA ALG: CPT | Performed by: OBSTETRICS & GYNECOLOGY

## 2022-12-01 PROCEDURE — 99999 PR PBB SHADOW E&M-EST. PATIENT-LVL III: ICD-10-PCS | Mod: PBBFAC,,, | Performed by: OBSTETRICS & GYNECOLOGY

## 2022-12-01 PROCEDURE — 87491 CHLMYD TRACH DNA AMP PROBE: CPT | Performed by: OBSTETRICS & GYNECOLOGY

## 2022-12-01 PROCEDURE — 3008F PR BODY MASS INDEX (BMI) DOCUMENTED: ICD-10-PCS | Mod: CPTII,,, | Performed by: OBSTETRICS & GYNECOLOGY

## 2022-12-01 PROCEDURE — 87591 N.GONORRHOEAE DNA AMP PROB: CPT | Performed by: OBSTETRICS & GYNECOLOGY

## 2022-12-01 PROCEDURE — 87529 HSV DNA AMP PROBE: CPT | Performed by: OBSTETRICS & GYNECOLOGY

## 2022-12-01 PROCEDURE — 1159F PR MEDICATION LIST DOCUMENTED IN MEDICAL RECORD: ICD-10-PCS | Mod: CPTII,,, | Performed by: OBSTETRICS & GYNECOLOGY

## 2022-12-01 PROCEDURE — 1159F MED LIST DOCD IN RCRD: CPT | Mod: CPTII,,, | Performed by: OBSTETRICS & GYNECOLOGY

## 2022-12-01 RX ORDER — FAMOTIDINE 20 MG/1
20 TABLET, FILM COATED ORAL
COMMUNITY
Start: 2022-08-16

## 2022-12-01 NOTE — PROGRESS NOTES
"HSV PCR Source  Vagina  VAGINA VC    HSV1, PCR Negative Negative  Negative    HSV2, PCR Negative Positive Abnormal   Positive Abnormal         11/30/22    Bacterial vaginosis DNA Positive !   Candida glabrata DNA Negative   Candida krusei DNA Negative   Candida sp Negative   Chlamydia, Amplified DNA Not Detected   N gonorrhoeae, amplified DNA Not Detected   Trichomonas vaginalis Negative   BV    PT HERE FOR LESION ON L LABIA MINORA X 3 DAYS.  NO PAIN BUT FEELS LIKE A LUMP AND HAS GOTTEN SMALLER. NO DRAINAGE.  VAGINA FEELS "OFF".    ROS:  GENERAL: No fever, chills, fatigability or weight loss.  VULVAR: No pain, no lesions and no itching.  VAGINAL: No relaxation, no itching, no discharge, no abnormal bleeding and no lesions.  ABDOMEN: No abdominal pain. Denies nausea. Denies vomiting. No diarrhea. No constipation  BREAST: Denies pain. No lumps. No discharge.  URINARY: No incontinence, no nocturia, no frequency and no dysuria.  CARDIOVASCULAR: No chest pain. No shortness of breath. No leg cramps.  NEUROLOGICAL: No headaches. No vision changes.  The remainder of the review of systems was negative.    PE:  General Appearance: obese And Well developed. Well nourished. In no acute distress.  Vulva: Lesions: YES. ? HEALING BLISTER L LABIA MINOR.  Urethral Meatus: Normal size. Normal location. No lesions. No prolapse.  Urethra: No masses. No tenderness. No prolapse. No scarring.  Bladder: No masses. No tenderness.  Vagina: Mucosa NI:yes discharge no, atrophy no, cystocele no or rectocele no.  Cervix: Lesion: no  Stenotic: no Cervical motion tenderness: no      PROCEDURES:    PLAN:     DIAGNOSIS:  1. HSV (herpes simplex virus) anogenital infection        MEDICATIONS & ORDERS:  Orders Placed This Encounter    C. trachomatis/N. gonorrhoeae by AMP DNA    Vaginosis Screen by DNA Probe    HSV by Rapid PCR, Non-Blood [facility]; Vagina         FOLLOW-UP: With me PRN    Kaden De Los Santos Jr, MD, FACOG      "

## 2022-12-02 ENCOUNTER — PATIENT MESSAGE (OUTPATIENT)
Dept: OBSTETRICS AND GYNECOLOGY | Facility: CLINIC | Age: 32
End: 2022-12-02
Payer: MEDICAID

## 2022-12-02 LAB
BACTERIAL VAGINOSIS DNA: POSITIVE
C TRACH DNA SPEC QL NAA+PROBE: NOT DETECTED
CANDIDA GLABRATA DNA: NEGATIVE
CANDIDA KRUSEI DNA: NEGATIVE
CANDIDA RRNA VAG QL PROBE: NEGATIVE
N GONORRHOEA DNA SPEC QL NAA+PROBE: NOT DETECTED
T VAGINALIS RRNA GENITAL QL PROBE: NEGATIVE

## 2022-12-02 RX ORDER — METRONIDAZOLE 500 MG/1
500 TABLET ORAL EVERY 12 HOURS
Qty: 14 TABLET | Refills: 0 | Status: SHIPPED | OUTPATIENT
Start: 2022-12-02 | End: 2022-12-09

## 2023-05-02 ENCOUNTER — OFFICE VISIT (OUTPATIENT)
Dept: OBSTETRICS AND GYNECOLOGY | Facility: CLINIC | Age: 33
End: 2023-05-02
Payer: MEDICAID

## 2023-05-02 VITALS
DIASTOLIC BLOOD PRESSURE: 76 MMHG | SYSTOLIC BLOOD PRESSURE: 104 MMHG | BODY MASS INDEX: 39.08 KG/M2 | HEIGHT: 66 IN | WEIGHT: 243.19 LBS

## 2023-05-02 DIAGNOSIS — Z01.419 ENCOUNTER FOR WELL WOMAN EXAM: Primary | ICD-10-CM

## 2023-05-02 DIAGNOSIS — Z20.2 POTENTIAL EXPOSURE TO STD: ICD-10-CM

## 2023-05-02 DIAGNOSIS — N63.21 MASS OF UPPER OUTER QUADRANT OF LEFT BREAST: ICD-10-CM

## 2023-05-02 LAB
B-HCG UR QL: NEGATIVE
CTP QC/QA: YES

## 2023-05-02 PROCEDURE — 3078F DIAST BP <80 MM HG: CPT | Mod: CPTII,,, | Performed by: FAMILY MEDICINE

## 2023-05-02 PROCEDURE — 99213 OFFICE O/P EST LOW 20 MIN: CPT | Mod: PBBFAC | Performed by: FAMILY MEDICINE

## 2023-05-02 PROCEDURE — 99999 PR PBB SHADOW E&M-EST. PATIENT-LVL III: ICD-10-PCS | Mod: PBBFAC,,, | Performed by: FAMILY MEDICINE

## 2023-05-02 PROCEDURE — 99395 PR PREVENTIVE VISIT,EST,18-39: ICD-10-PCS | Mod: S$PBB,,, | Performed by: FAMILY MEDICINE

## 2023-05-02 PROCEDURE — 3078F PR MOST RECENT DIASTOLIC BLOOD PRESSURE < 80 MM HG: ICD-10-PCS | Mod: CPTII,,, | Performed by: FAMILY MEDICINE

## 2023-05-02 PROCEDURE — 1160F RVW MEDS BY RX/DR IN RCRD: CPT | Mod: CPTII,,, | Performed by: FAMILY MEDICINE

## 2023-05-02 PROCEDURE — 81025 URINE PREGNANCY TEST: CPT | Mod: PBBFAC | Performed by: FAMILY MEDICINE

## 2023-05-02 PROCEDURE — 1159F PR MEDICATION LIST DOCUMENTED IN MEDICAL RECORD: ICD-10-PCS | Mod: CPTII,,, | Performed by: FAMILY MEDICINE

## 2023-05-02 PROCEDURE — 1160F PR REVIEW ALL MEDS BY PRESCRIBER/CLIN PHARMACIST DOCUMENTED: ICD-10-PCS | Mod: CPTII,,, | Performed by: FAMILY MEDICINE

## 2023-05-02 PROCEDURE — 99395 PREV VISIT EST AGE 18-39: CPT | Mod: S$PBB,,, | Performed by: FAMILY MEDICINE

## 2023-05-02 PROCEDURE — 3074F SYST BP LT 130 MM HG: CPT | Mod: CPTII,,, | Performed by: FAMILY MEDICINE

## 2023-05-02 PROCEDURE — 1159F MED LIST DOCD IN RCRD: CPT | Mod: CPTII,,, | Performed by: FAMILY MEDICINE

## 2023-05-02 PROCEDURE — 87591 N.GONORRHOEAE DNA AMP PROB: CPT | Performed by: FAMILY MEDICINE

## 2023-05-02 PROCEDURE — 3008F BODY MASS INDEX DOCD: CPT | Mod: CPTII,,, | Performed by: FAMILY MEDICINE

## 2023-05-02 PROCEDURE — 99999 PR PBB SHADOW E&M-EST. PATIENT-LVL III: CPT | Mod: PBBFAC,,, | Performed by: FAMILY MEDICINE

## 2023-05-02 PROCEDURE — 3008F PR BODY MASS INDEX (BMI) DOCUMENTED: ICD-10-PCS | Mod: CPTII,,, | Performed by: FAMILY MEDICINE

## 2023-05-02 PROCEDURE — 81514 NFCT DS BV&VAGINITIS DNA ALG: CPT | Performed by: FAMILY MEDICINE

## 2023-05-02 PROCEDURE — 3074F PR MOST RECENT SYSTOLIC BLOOD PRESSURE < 130 MM HG: ICD-10-PCS | Mod: CPTII,,, | Performed by: FAMILY MEDICINE

## 2023-05-02 NOTE — PROGRESS NOTES
HISTORY OF PRESENT ILLNESS:    Purvi Ann is a 32 y.o. female, , Patient's last menstrual period was 2023 (exact date).,  presents for a routine annual exam .   Last pap:   NL   Gardisil?: Yes  Last mammogram: na -had breast lump on previous exam but never able to get imaging. Has not changed. No new sx   Last colon ca screening:  na   SA: male partner   Contraception: none.    Sexually transmitted infection risk: very low risk of STD exposure.   Menstrual flow: regular every 28-30 days. Last cycle dysmenorrhea and NV but that is not common for her  -considering TTC in the next year or so. Discussed PNV, once ready to try - opks, timed intercourse around ovulation, tracking cycles, 1 yr of actively trying  -no abnormal VD/odor/itching  This is the extent of the patient's complaints at this time.     Past Medical History:   Diagnosis Date    Allergy     HSV anogenital infection     Recurrent upper respiratory infection (URI)        Past Surgical History:   Procedure Laterality Date    NO PAST SURGERIES         MEDICATIONS AND ALLERGIES:      Current Outpatient Medications:     albuterol (PROVENTIL/VENTOLIN HFA) 90 mcg/actuation inhaler, Inhale 1-2 puffs into the lungs every 6 (six) hours as needed for Wheezing or Shortness of Breath. Rescue, Disp: 6.7 g, Rfl: 0    cetirizine (ZYRTEC) 10 MG tablet, Take 10 mg by mouth once daily., Disp: , Rfl:     cyclobenzaprine (FLEXERIL) 5 MG tablet, Take 1 tablet (5 mg total) by mouth 3 (three) times daily as needed., Disp: 30 tablet, Rfl: 0    diphenhydrAMINE (BENADRYL) 12.5 mg/5 mL elixir, Take by mouth every 8 (eight) hours., Disp: , Rfl:     ibuprofen (ADVIL,MOTRIN) 600 MG tablet, Take 1 tablet (600 mg total) by mouth every 6 (six) hours as needed for Pain., Disp: 20 tablet, Rfl: 0    polyethylene glycol (GLYCOLAX) 17 gram PwPk, Take 17 g by mouth once daily., Disp: 100 each, Rfl: 3    azelastine (ASTELIN) 137 mcg (0.1 %) nasal spray, 1-2 sprays  (137-274 mcg total) by Nasal route 2 (two) times daily as needed for Rhinitis. (Patient not taking: Reported on 2023), Disp: 30 mL, Rfl: 5    azithromycin (Z-PATRICIA) 250 MG tablet, Take 1 tablet (250 mg total) by mouth once daily. Take first 2 tablets together, then 1 every day until finished. (Patient not taking: Reported on 2023), Disp: 6 tablet, Rfl: 0    famotidine (PEPCID) 20 MG tablet, Take 20 mg by mouth., Disp: , Rfl:     fluconazole (DIFLUCAN) 150 MG Tab, TAKE 1 TABLET BY MOUTH ONCE. REFILL AND REDOSE IF SYMPTOMS RECUR FOR 1 DOSE, Disp: , Rfl:     fluticasone propionate (FLONASE) 50 mcg/actuation nasal spray, 1 spray (50 mcg total) by Each Nostril route 2 (two) times daily as needed. (Patient not taking: Reported on 2023), Disp: 15 g, Rfl: 0    traZODone (DESYREL) 50 MG tablet, Take 1-2 tabs as needed to sleep.  Will cause drowsiness. (Patient not taking: Reported on 2023), Disp: 60 tablet, Rfl: 3    valACYclovir (VALTREX) 500 MG tablet, Take 1 tablet (500 mg total) by mouth 2 (two) times daily. for 5 days, Disp: 30 tablet, Rfl: 3    Review of patient's allergies indicates:   Allergen Reactions    Ceftriaxone Edema    Percocet [oxycodone-acetaminophen]        Family History   Problem Relation Age of Onset    Colon cancer Father     Cancer Father 46        colon ca    Diabetes Mother     Hypertension Mother     Breast cancer Neg Hx     Eclampsia Neg Hx     Miscarriages / Stillbirths Neg Hx     Ovarian cancer Neg Hx      labor Neg Hx     Stroke Neg Hx     Cervical cancer Neg Hx     Vaginal cancer Neg Hx     Endometrial cancer Neg Hx        Social History     Socioeconomic History    Marital status: Single   Tobacco Use    Smoking status: Never    Smokeless tobacco: Never   Substance and Sexual Activity    Alcohol use: Yes     Comment: social    Drug use: No    Sexual activity: Yes     Partners: Male     Birth control/protection: None, Condom       OB History    Para Term  AB  "Living   0 0 0 0 0 0   SAB IAB Ectopic Multiple Live Births   0 0 0 0            COMPREHENSIVE GYN HISTORY:  PAP History: Denies abnormal Paps.  Infection History: Denies STDs. Denies PID.  Benign History: Denies uterine fibroids. Denies ovarian cysts. Denies endometriosis. Denies other conditions.  Cancer History: Denies cervical cancer. Denies uterine cancer or hyperplasia. Denies ovarian cancer. Denies vulvar cancer or pre-cancer. Denies vaginal cancer or pre-cancer. Denies breast cancer. Denies colon cancer.      ROS:  GENERAL: No weight changes. No swelling. No fatigue. No fever.  BREASTS: No pain. + lumps. No discharge.  ABDOMEN: No pain. No nausea. No vomiting. No diarrhea. No constipation.  REPRODUCTIVE: No abnormal bleeding.   VULVA: No pain. No lesions. No itching.  VAGINA: No relaxation. No itching. No odor. No discharge. No lesions.  URINARY: No incontinence. No nocturia. No frequency. No dysuria.    /76   Ht 5' 6" (1.676 m)   Wt 110.3 kg (243 lb 2.7 oz)   LMP 04/11/2023 (Exact Date)   BMI 39.25 kg/m²     PE:  Physical Exam:   Constitutional: She is oriented to person, place, and time. She appears well-developed and well-nourished. No distress.      Neck: No thyroid mass and no thyromegaly present.     Pulmonary/Chest: Right breast exhibits no inverted nipple, no mass, no nipple discharge, no skin change, no tenderness and no bleeding. Left breast exhibits mass. Left breast exhibits no inverted nipple, no nipple discharge, no skin change, no tenderness and no bleeding.            Abdominal: Soft. There is no abdominal tenderness.     Genitourinary:    Vagina, uterus, right adnexa and left adnexa normal.      Pelvic exam was performed with patient supine.   The external female genitalia was normal.   Genitalia hair distrobution normal .   There is no rash or lesion on the right labia. There is no rash or lesion on the left labia. Cervix is normal. Right adnexum displays no mass, no tenderness " and no fullness. Left adnexum displays no mass, no tenderness and no fullness. No  no vaginal discharge, tenderness, bleeding, rectocele, cystocele or unspecified prolapse of vaginal walls in the vagina.    No foreign body in the vagina.   Cervix exhibits no motion tenderness, no lesion, no discharge, no friability, no lesion, no tenderness and no polyp. Uterus is not enlarged and not tender. Normal urethral meatus.              Neurological: She is alert and oriented to person, place, and time.        PROCEDURES/ORDERS:  Pap  Std testing  Breast imaging    Results for orders placed or performed in visit on 05/02/23   POCT Urine Pregnancy   Result Value Ref Range    POC Preg Test, Ur Negative Negative     Acceptable Yes        Assessment/Plan:    Encounter for well woman exam    Mass of upper outer quadrant of left breast  -     US Breast Left Limited; Future; Expected date: 05/02/2023  -     Mammo Digital Diagnostic Left with Bridger; Future; Expected date: 05/02/2023  -     POCT Urine Pregnancy    Potential exposure to STD  -     Vaginosis Screen by DNA Probe  -     C. trachomatis/N. gonorrhoeae by AMP DNA [facility]; Cervicovaginal        COUNSELING:  The patient was counseled today on:  -A.C.S. Pap and pelvic exam guidelines, recomendations for yearly mammogram, monthly self breast exams and to follow up with her PCP for other health maintenance.    FOLLOW-UP  annually for WWE.

## 2023-05-04 ENCOUNTER — PATIENT MESSAGE (OUTPATIENT)
Dept: OBSTETRICS AND GYNECOLOGY | Facility: CLINIC | Age: 33
End: 2023-05-04
Payer: MEDICAID

## 2023-05-04 DIAGNOSIS — N76.0 BV (BACTERIAL VAGINOSIS): Primary | ICD-10-CM

## 2023-05-04 DIAGNOSIS — B96.89 BV (BACTERIAL VAGINOSIS): Primary | ICD-10-CM

## 2023-05-05 RX ORDER — METRONIDAZOLE 500 MG/1
500 TABLET ORAL 2 TIMES DAILY
Qty: 14 TABLET | Refills: 0 | Status: SHIPPED | OUTPATIENT
Start: 2023-05-05 | End: 2023-05-12

## 2023-06-09 ENCOUNTER — HOSPITAL ENCOUNTER (OUTPATIENT)
Dept: RADIOLOGY | Facility: OTHER | Age: 33
Discharge: HOME | End: 2023-06-09
Attending: FAMILY MEDICINE
Payer: MEDICAID

## 2023-06-09 DIAGNOSIS — N63.21 MASS OF UPPER OUTER QUADRANT OF LEFT BREAST: ICD-10-CM

## 2023-06-09 DIAGNOSIS — N63.0 BREAST MASS: ICD-10-CM

## 2023-06-09 PROCEDURE — 77066 MAMMO DIGITAL DIAGNOSTIC BILAT WITH TOMO: ICD-10-PCS | Mod: 26,,, | Performed by: RADIOLOGY

## 2023-06-09 PROCEDURE — 77062 MAMMO DIGITAL DIAGNOSTIC BILAT WITH TOMO: ICD-10-PCS | Mod: 26,,, | Performed by: RADIOLOGY

## 2023-06-09 PROCEDURE — 77066 DX MAMMO INCL CAD BI: CPT | Mod: 26,,, | Performed by: RADIOLOGY

## 2023-06-09 PROCEDURE — 76642 ULTRASOUND BREAST LIMITED: CPT | Mod: 26,LT,, | Performed by: RADIOLOGY

## 2023-06-09 PROCEDURE — 76642 ULTRASOUND BREAST LIMITED: CPT | Mod: TC,LT

## 2023-06-09 PROCEDURE — 76642 US BREAST LEFT LIMITED: ICD-10-PCS | Mod: 26,LT,, | Performed by: RADIOLOGY

## 2023-06-09 PROCEDURE — 77062 BREAST TOMOSYNTHESIS BI: CPT | Mod: 26,,, | Performed by: RADIOLOGY

## 2023-06-09 PROCEDURE — 77062 BREAST TOMOSYNTHESIS BI: CPT | Mod: TC

## 2023-07-26 DIAGNOSIS — N76.0 BV (BACTERIAL VAGINOSIS): ICD-10-CM

## 2023-07-26 DIAGNOSIS — B96.89 BV (BACTERIAL VAGINOSIS): ICD-10-CM

## 2023-07-27 ENCOUNTER — TELEPHONE (OUTPATIENT)
Dept: INTERNAL MEDICINE | Facility: CLINIC | Age: 33
End: 2023-07-27
Payer: MEDICAID

## 2023-07-27 RX ORDER — METRONIDAZOLE 500 MG/1
TABLET ORAL
Qty: 14 TABLET | Refills: 0 | OUTPATIENT
Start: 2023-07-27

## 2023-07-27 NOTE — TELEPHONE ENCOUNTER
----- Message from Sanna Wiley sent at 7/27/2023 11:45 AM CDT -----  Regarding: appointment  Name of Who is Calling:            What is the request in detail: Patient is requesting a call back to schedule annual.           Can the clinic reply by [facility]: Yes           What [facility]: [phone]

## 2023-07-27 NOTE — TELEPHONE ENCOUNTER
Reached out to pt, pt stated she is having vaginal odor and would like to be seen. Found good date and time for pt. Pt verbalized understanding.       ----- Message from Sanna Wiley sent at 7/27/2023 11:42 AM CDT -----  Regarding: appointment  Name of Who is Calling: Purvi           What is the request in detail: Patient is requesting a call back to schedule an appointment to be seen for BV.           Can the clinic reply by [facility]: Yes           What Number to Call Back if not in [facility]: [phone]

## 2023-08-01 ENCOUNTER — OFFICE VISIT (OUTPATIENT)
Dept: OBSTETRICS AND GYNECOLOGY | Facility: CLINIC | Age: 33
End: 2023-08-01
Payer: MEDICAID

## 2023-08-01 VITALS
HEIGHT: 66 IN | WEIGHT: 239 LBS | SYSTOLIC BLOOD PRESSURE: 105 MMHG | DIASTOLIC BLOOD PRESSURE: 77 MMHG | BODY MASS INDEX: 38.41 KG/M2

## 2023-08-01 DIAGNOSIS — B96.89 BACTERIAL VAGINOSIS: ICD-10-CM

## 2023-08-01 DIAGNOSIS — Z20.2 POTENTIAL EXPOSURE TO STD: ICD-10-CM

## 2023-08-01 DIAGNOSIS — N89.8 VAGINAL ODOR: Primary | ICD-10-CM

## 2023-08-01 DIAGNOSIS — N76.0 BACTERIAL VAGINOSIS: ICD-10-CM

## 2023-08-01 PROCEDURE — 3078F PR MOST RECENT DIASTOLIC BLOOD PRESSURE < 80 MM HG: ICD-10-PCS | Mod: CPTII,,, | Performed by: FAMILY MEDICINE

## 2023-08-01 PROCEDURE — 1160F RVW MEDS BY RX/DR IN RCRD: CPT | Mod: CPTII,,, | Performed by: FAMILY MEDICINE

## 2023-08-01 PROCEDURE — 99999 PR PBB SHADOW E&M-EST. PATIENT-LVL III: CPT | Mod: PBBFAC,,, | Performed by: FAMILY MEDICINE

## 2023-08-01 PROCEDURE — 99213 PR OFFICE/OUTPT VISIT, EST, LEVL III, 20-29 MIN: ICD-10-PCS | Mod: S$PBB,,, | Performed by: FAMILY MEDICINE

## 2023-08-01 PROCEDURE — 3008F BODY MASS INDEX DOCD: CPT | Mod: CPTII,,, | Performed by: FAMILY MEDICINE

## 2023-08-01 PROCEDURE — 1159F PR MEDICATION LIST DOCUMENTED IN MEDICAL RECORD: ICD-10-PCS | Mod: CPTII,,, | Performed by: FAMILY MEDICINE

## 2023-08-01 PROCEDURE — 3078F DIAST BP <80 MM HG: CPT | Mod: CPTII,,, | Performed by: FAMILY MEDICINE

## 2023-08-01 PROCEDURE — 81514 NFCT DS BV&VAGINITIS DNA ALG: CPT | Performed by: FAMILY MEDICINE

## 2023-08-01 PROCEDURE — 99213 OFFICE O/P EST LOW 20 MIN: CPT | Mod: S$PBB,,, | Performed by: FAMILY MEDICINE

## 2023-08-01 PROCEDURE — 87591 N.GONORRHOEAE DNA AMP PROB: CPT | Performed by: FAMILY MEDICINE

## 2023-08-01 PROCEDURE — 99999 PR PBB SHADOW E&M-EST. PATIENT-LVL III: ICD-10-PCS | Mod: PBBFAC,,, | Performed by: FAMILY MEDICINE

## 2023-08-01 PROCEDURE — 1159F MED LIST DOCD IN RCRD: CPT | Mod: CPTII,,, | Performed by: FAMILY MEDICINE

## 2023-08-01 PROCEDURE — 3074F SYST BP LT 130 MM HG: CPT | Mod: CPTII,,, | Performed by: FAMILY MEDICINE

## 2023-08-01 PROCEDURE — 3008F PR BODY MASS INDEX (BMI) DOCUMENTED: ICD-10-PCS | Mod: CPTII,,, | Performed by: FAMILY MEDICINE

## 2023-08-01 PROCEDURE — 1160F PR REVIEW ALL MEDS BY PRESCRIBER/CLIN PHARMACIST DOCUMENTED: ICD-10-PCS | Mod: CPTII,,, | Performed by: FAMILY MEDICINE

## 2023-08-01 PROCEDURE — 3074F PR MOST RECENT SYSTOLIC BLOOD PRESSURE < 130 MM HG: ICD-10-PCS | Mod: CPTII,,, | Performed by: FAMILY MEDICINE

## 2023-08-01 PROCEDURE — 99213 OFFICE O/P EST LOW 20 MIN: CPT | Mod: PBBFAC | Performed by: FAMILY MEDICINE

## 2023-08-01 RX ORDER — METRONIDAZOLE 500 MG/1
500 TABLET ORAL EVERY 12 HOURS
Qty: 14 TABLET | Refills: 0 | Status: SHIPPED | OUTPATIENT
Start: 2023-08-01 | End: 2023-08-08

## 2023-08-01 NOTE — PROGRESS NOTES
CC: Vaginitis  HPI: Patient presents for evaluation of an abnormal vaginal discharge. Symptoms have been present for 2 weeks. Vaginal symptoms: odor. Contraception: condoms. She denies discharge, local irritation, urinary symptoms of dysuria, hematuria, and fever, and vulvar itching. Sexually transmitted infection risk: possible STD exposure. SA male partner, does use condoms. Used new soap recently.This is the extent of the patient's complaints at this time.     ROS:  GENERAL: No fever, chills, fatigability or weight loss.  VULVAR: No pain, no lesions and no itching.  VAGINAL: No relaxation, no itching, no discharge, no abnormal bleeding and no lesions.+odor  URINARY: No incontinence, no nocturia, no frequency and no dysuria.     PHYSICAL EXAM:  Physical Exam:   Constitutional: She appears well-developed and well-nourished. She does not appear ill. No distress.               Genitourinary:    Vagina, uterus, right adnexa and left adnexa normal.      Pelvic exam was performed with patient supine.   The external female genitalia was normal.   Labial bartholins normal.There is no rash, tenderness or lesion on the right labia. There is no rash, tenderness or lesion on the left labia. Cervix is normal. Right adnexum displays no mass, no tenderness and no fullness. Left adnexum displays no mass, no tenderness and no fullness. No  no vaginal discharge, tenderness, bleeding, rectocele, cystocele or unspecified prolapse of vaginal walls in the vagina.    No foreign body in the vagina.   Cervix exhibits no motion tenderness, no lesion, no discharge, no friability, no lesion and no polyp. Normal urethral meatus.Urethra findings: no urethral mass              Neurological: She is alert. GCS eye subscore is 4. GCS verbal subscore is 5. GCS motor subscore is 6.           Past Medical History:   Diagnosis Date    Allergy     HSV anogenital infection 2022    Recurrent upper respiratory infection (URI)        Past Surgical History:    Procedure Laterality Date    NO PAST SURGERIES         Family History   Problem Relation Age of Onset    Colon cancer Father     Cancer Father 46        colon ca    Diabetes Mother     Hypertension Mother     Breast cancer Neg Hx     Eclampsia Neg Hx     Miscarriages / Stillbirths Neg Hx     Ovarian cancer Neg Hx      labor Neg Hx     Stroke Neg Hx     Cervical cancer Neg Hx     Vaginal cancer Neg Hx     Endometrial cancer Neg Hx        Social History     Socioeconomic History    Marital status: Single   Tobacco Use    Smoking status: Never    Smokeless tobacco: Never   Substance and Sexual Activity    Alcohol use: Yes     Comment: social    Drug use: No    Sexual activity: Yes     Partners: Male     Birth control/protection: None, Condom     Social Determinants of Health     Financial Resource Strain: Low Risk  (2021)    Overall Financial Resource Strain (CARDIA)     Difficulty of Paying Living Expenses: Not very hard   Food Insecurity: No Food Insecurity (2021)    Hunger Vital Sign     Worried About Running Out of Food in the Last Year: Never true     Ran Out of Food in the Last Year: Never true   Transportation Needs: No Transportation Needs (2021)    PRAPARE - Transportation     Lack of Transportation (Medical): No     Lack of Transportation (Non-Medical): No   Physical Activity: Unknown (2021)    Exercise Vital Sign     Days of Exercise per Week: Patient refused     Minutes of Exercise per Session: Patient refused   Stress: No Stress Concern Present (2021)    [employer] [employer] - Occupational Stress Questionnaire     Feeling of Stress : Only a little   Social Connections: Unknown (2021)    Social Connection and Isolation Panel [NHANES]     Frequency of Communication with Friends and Family: Patient refused     Frequency of Social Gatherings with Friends and Family: Patient refused     Attends Faith Services: Patient refused     Active Member of  Clubs or Organizations: Patient refused     Attends Club or Organization Meetings: Patient refused     Marital Status: Patient refused   Housing Stability: Unknown (6/30/2021)    Housing Stability Vital Sign     Unable to Pay for Housing in the Last Year: No     Unstable Housing in the Last Year: No       Current Outpatient Medications   Medication Sig Dispense Refill    albuterol (PROVENTIL/VENTOLIN HFA) 90 mcg/actuation inhaler Inhale 1-2 puffs into the lungs every 6 (six) hours as needed for Wheezing or Shortness of Breath. Rescue (Patient not taking: Reported on 8/1/2023) 6.7 g 0    azelastine (ASTELIN) 137 mcg (0.1 %) nasal spray 1-2 sprays (137-274 mcg total) by Nasal route 2 (two) times daily as needed for Rhinitis. (Patient not taking: Reported on 5/2/2023) 30 mL 5    azithromycin (Z-PATRICIA) 250 MG tablet Take 1 tablet (250 mg total) by mouth once daily. Take first 2 tablets together, then 1 every day until finished. (Patient not taking: Reported on 5/2/2023) 6 tablet 0    cetirizine (ZYRTEC) 10 MG tablet Take 10 mg by mouth once daily.      cyclobenzaprine (FLEXERIL) 5 MG tablet Take 1 tablet (5 mg total) by mouth 3 (three) times daily as needed. (Patient not taking: Reported on 8/1/2023) 30 tablet 0    diphenhydrAMINE (BENADRYL) 12.5 mg/5 mL elixir Take by mouth every 8 (eight) hours.      famotidine (PEPCID) 20 MG tablet Take 20 mg by mouth.      fluconazole (DIFLUCAN) 150 MG Tab TAKE 1 TABLET BY MOUTH ONCE. REFILL AND REDOSE IF SYMPTOMS RECUR FOR 1 DOSE      fluticasone propionate (FLONASE) 50 mcg/actuation nasal spray 1 spray (50 mcg total) by Each Nostril route 2 (two) times daily as needed. (Patient not taking: Reported on 5/2/2023) 15 g 0    ibuprofen (ADVIL,MOTRIN) 600 MG tablet Take 1 tablet (600 mg total) by mouth every 6 (six) hours as needed for Pain. (Patient not taking: Reported on 8/1/2023) 20 tablet 0    metroNIDAZOLE (FLAGYL) 500 MG tablet Take 1 tablet (500 mg total) by mouth every 12  (twelve) hours. Eat when taking, do not drink alcohol while taking and for 2 days after stopping for 7 days 14 tablet 0    polyethylene glycol (GLYCOLAX) 17 gram PwPk Take 17 g by mouth once daily. (Patient not taking: Reported on 2023) 100 each 3    traZODone (DESYREL) 50 MG tablet Take 1-2 tabs as needed to sleep.  Will cause drowsiness. (Patient not taking: Reported on 2023) 60 tablet 3    valACYclovir (VALTREX) 500 MG tablet Take 1 tablet (500 mg total) by mouth 2 (two) times daily. for 5 days 30 tablet 3     No current facility-administered medications for this visit.       Review of patient's allergies indicates:   Allergen Reactions    Ceftriaxone Edema    Percocet [oxycodone-acetaminophen]           OB History    Para Term  AB Living   0 0 0 0 0 0   SAB IAB Ectopic Multiple Live Births   0 0 0 0            Assessment/Plan:    Vaginal odor  -     Vaginosis Screen by DNA Probe    Potential exposure to STD  -     C. trachomatis/N. gonorrhoeae by AMP DNA [facility]; Cervicovaginal    Bacterial vaginosis  -     metroNIDAZOLE (FLAGYL) 500 MG tablet; Take 1 tablet (500 mg total) by mouth every 12 (twelve) hours. Eat when taking, do not drink alcohol while taking and for 2 days after stopping for 7 days  Dispense: 14 tablet; Refill: 0          Patient was counseled today on vaginitis prevention including :  a. avoiding feminine products such as deoderant soaps, body wash, bubble bath, douches, scented toilet paper, deoderant tampons or pads, feminine wipes, chronic pad use, etc.  b. avoiding other vulvovaginal irritants such as long hot baths, humidity, tight, synthetic clothing, chlorine and sitting around in wet bathing suits  c. wearing cotton underwear, avoiding thong underwear and no underwear to bed  d. taking showers instead of baths and use a hair dryer on cool setting afterwards to dry  e. wearing cotton to exercise and shower immediately after exercise and change clothes  f. using  polyurethane condoms without spermicide if sexually active and symptoms are triggered by intercourse     FOLLOW UP: PRN/lack of improvement.

## 2023-08-17 DIAGNOSIS — F51.01 PRIMARY INSOMNIA: ICD-10-CM

## 2023-08-17 RX ORDER — TRAZODONE HYDROCHLORIDE 50 MG/1
TABLET ORAL
Qty: 60 TABLET | Refills: 0 | Status: SHIPPED | OUTPATIENT
Start: 2023-08-17 | End: 2023-09-17

## 2023-08-17 NOTE — TELEPHONE ENCOUNTER
No care due was identified.  [facility] Embedded Care Due Messages. Reference number: [id number].   8/17/2023 4:15:20 AM CDT

## 2023-08-17 NOTE — TELEPHONE ENCOUNTER
Refill Decision Note   Purvi Ann  is requesting a refill authorization.  Brief Assessment and Rationale for Refill:  Approve     Medication Therapy Plan:         Comments:     Note composed:7:24 AM 08/17/2023

## 2023-08-22 ENCOUNTER — OFFICE VISIT (OUTPATIENT)
Dept: URGENT CARE | Facility: CLINIC | Age: 33
End: 2023-08-22
Payer: MEDICAID

## 2023-08-22 VITALS
TEMPERATURE: 98 F | WEIGHT: 238 LBS | HEART RATE: 81 BPM | OXYGEN SATURATION: 98 % | BODY MASS INDEX: 38.25 KG/M2 | RESPIRATION RATE: 19 BRPM | HEIGHT: 66 IN | DIASTOLIC BLOOD PRESSURE: 82 MMHG | SYSTOLIC BLOOD PRESSURE: 122 MMHG

## 2023-08-22 DIAGNOSIS — L29.9 PRURITUS: ICD-10-CM

## 2023-08-22 DIAGNOSIS — N61.0 CELLULITIS OF LEFT BREAST: Primary | ICD-10-CM

## 2023-08-22 DIAGNOSIS — Z91.038 ALLERGIC REACTION TO INSECT BITE: ICD-10-CM

## 2023-08-22 PROCEDURE — 99214 PR OFFICE/OUTPT VISIT, EST, LEVL IV, 30-39 MIN: ICD-10-PCS | Mod: S$GLB,,, | Performed by: PHYSICIAN ASSISTANT

## 2023-08-22 PROCEDURE — 99214 OFFICE O/P EST MOD 30 MIN: CPT | Mod: S$GLB,,, | Performed by: PHYSICIAN ASSISTANT

## 2023-08-22 RX ORDER — CETIRIZINE HYDROCHLORIDE 10 MG/1
10 TABLET ORAL DAILY PRN
Qty: 30 TABLET | Refills: 0 | Status: SHIPPED | OUTPATIENT
Start: 2023-08-22 | End: 2024-08-21

## 2023-08-22 RX ORDER — AMOXICILLIN AND CLAVULANATE POTASSIUM 875; 125 MG/1; MG/1
1 TABLET, FILM COATED ORAL EVERY 12 HOURS
Qty: 14 TABLET | Refills: 0 | Status: SHIPPED | OUTPATIENT
Start: 2023-08-22 | End: 2023-08-29

## 2023-08-22 NOTE — PATIENT INSTRUCTIONS
PLEASE READ YOUR DISCHARGE INSTRUCTIONS ENTIRELY AS IT CONTAINS IMPORTANT INFORMATION.    Please take the antibiotics to completion. Please supplement with OTC probiotics and yogurt.     Take OTC Tylenol or anti-inflammatory as needed for pain. If no contraindication.    Take  Zyrtec in the morning as needed for itching/allergic reaction and take Benadryl at night.  Use caution as it may cause drowsiness or constipation with these medication.    Please return or see your primary care doctor for signs of worsening infection (purulent drainage, fever, worsening swelling/redness/pain, inability to move your extremity).        Please arrange follow up with your primary medical clinic as soon as possible. You must understand that you've received an Urgent Care treatment only and that you may be released before all of your medical problems are known or treated. You, the patient, will arrange for follow up as instructed. If your symptoms worsen or fail to improve you should go to the Emergency Room.    WE CANNOT RULE OUT ALL POSSIBLE CAUSES OF YOUR SYMPTOMS IN THE URGENT CARE SETTING PLEASE GO TO THE ER IF YOU FEELS YOUR CONDITION IS WORSENING OR YOU WOULD LIKE EMERGENT EVALUATION.      Follow-up care  Follow up with your healthcare provider as advised. If you have stitches or staples, be sure to return as directed to have them removed.    When to seek medical advice  Call your healthcare provider right away if any of these occur:  Wound bleeding not controlled by direct pressure  Signs of infection, including increasing pain in the wound, increasing wound redness or swelling, or pus or bad odor coming from the wound  Fever of 100.4°F (38.ºC) or as directed by your health care provider  Wound edges re-open  Wound changes colors  Numbness or weakness in the affected hand   Decreased movement of the hand  Date Last Reviewed: 6/10/2015  © 5212-7013 [employer]. [address] [place] [address], [place] [address]. All  rights reserved. This information is not intended as a substitute for professional medical care. Always follow your healthcare professional's instructions.     <<----- Click to add NO pertinent Family History

## 2023-08-22 NOTE — PROGRESS NOTES
"Subjective:      Patient ID: Purvi Ann is a 32 y.o. female.    Vitals:  height is 5' 6" (1.676 m) and weight is 108 kg (238 lb). Her temperature is 97.8 °F (36.6 °C). Her blood pressure is 122/82 and her pulse is 81. Her respiration is 19 and oxygen saturation is 98%.     Chief Complaint: Insect Bite (Left breast- Possible ant bite )    32-year-old female presents urgent care clinic for evaluation.  Had an ant bite her on her left breast 3 days ago.  There is associated itching, redness, and swelling that is gradually worsened.  Took previous penicillin prescription from her dentist 1 pill once a day for the last 2 days.  Also took Benadryl for itching and use cold compress with some improvements in swelling.  No fever, chills, weakness, open wound, or drainage.  Not up-to-date with tetanus but will discuss with her PCP in 2 weeks.    Insect Bite  This is a new problem. The current episode started in the past 7 days. The problem occurs constantly. The problem has been gradually worsening. Associated symptoms include a rash. Pertinent negatives include no abdominal pain, arthralgias, chest pain, chills, congestion, coughing, diaphoresis, fatigue, fever, headaches, joint swelling, myalgias, nausea, neck pain, numbness, sore throat, vertigo, vomiting or weakness. Nothing aggravates the symptoms. Treatments tried: OTC- Oinments,  benadryl and old antibiotics she had at home. The treatment provided mild relief.       Constitution: Negative for activity change, appetite change, chills, sweating, fatigue, fever and generalized weakness.   HENT:  Negative for ear pain, hearing loss, facial swelling, congestion, postnasal drip, sinus pain, sinus pressure, sore throat, trouble swallowing and voice change.    Neck: Negative for neck pain, neck stiffness and painful lymph nodes.   Cardiovascular:  Negative for chest pain, leg swelling, palpitations, sob on exertion and passing out.   Eyes:  Negative for eye discharge, " eye pain, photophobia, vision loss, double vision and blurred vision.   Respiratory:  Negative for chest tightness, cough, sputum production, bloody sputum, COPD, shortness of breath, stridor, wheezing and asthma.    Gastrointestinal:  Negative for abdominal pain, nausea, vomiting, constipation, diarrhea, bright red blood in stool, rectal bleeding, heartburn and bowel incontinence.   Genitourinary:  Negative for dysuria, frequency, urgency, urine decreased, flank pain, bladder incontinence and hematuria.   Musculoskeletal:  Negative for trauma, joint pain, joint swelling, abnormal ROM of joint, muscle cramps and muscle ache.   Skin:  Positive for rash, skin thickening/induration and erythema. Negative for color change, pale and wound.   Allergic/Immunologic: Positive for itching. Negative for seasonal allergies, asthma and immunocompromised state.   Neurological:  Negative for dizziness, history of vertigo, light-headedness, passing out, facial drooping, speech difficulty, coordination disturbances, loss of balance, headaches, disorientation, altered mental status, loss of consciousness, numbness, tingling and seizures.   Hematologic/Lymphatic: Negative for swollen lymph nodes, easy bruising/bleeding and trouble clotting. Does not bruise/bleed easily.   Psychiatric/Behavioral:  Negative for altered mental status and disorientation.       Objective:     Physical Exam   Constitutional: She appears well-developed. She is cooperative.  Non-toxic appearance. She does not appear ill. No distress.   HENT:   Head: Normocephalic and atraumatic.   Ears:   Right Ear: Hearing, external ear and ear canal normal.   Left Ear: Hearing, external ear and ear canal normal.   Nose: Nose normal.   Mouth/Throat: Oropharynx is clear and moist. Mucous membranes are moist. Oropharynx is clear.   Eyes: Conjunctivae, EOM and lids are normal. Right eye exhibits no discharge. Left eye exhibits no discharge. vision grossly intact gaze aligned  appropriately   Neck: Neck supple. No neck rigidity present.   Cardiovascular: Normal rate, regular rhythm and normal pulses.   Pulmonary/Chest: Effort normal. No accessory muscle usage. No respiratory distress. She has no wheezes. She exhibits no tenderness.   Abdominal: Normal appearance. She exhibits no distension.   Musculoskeletal: Normal range of motion.         General: Normal range of motion.      Right lower leg: No edema.      Left lower leg: No edema.      Comments: Moves all extremities with normal tone, strength, and ROM.   Neurological: no focal deficit. She is alert and at baseline. She has normal motor skills and normal sensation. She displays no weakness, facial symmetry and normal reflexes. No cranial nerve deficit or sensory deficit. She exhibits normal muscle tone. Gait and coordination normal. Coordination normal.   Skin: Skin is warm, dry, not diaphoretic and rash. Capillary refill takes less than 2 seconds. erythema         Comments: Left breast with 4cm area of erythema, induration, and tenderness to palpation.  Ear surrounding swelling.  No open wound, punctate, vesicle, or drainage.   Psychiatric: Her behavior is normal. Mood and thought content normal.   Nursing note and vitals reviewed.        Assessment:     1. Cellulitis of left breast    2. Allergic reaction to insect bite    3. Pruritus      Note dictated with voice recognition software, please excuse any grammatical errors.    Patient presents with clinical exam findings and history consistent with above.     On exam, patient is nontoxic appearing and vitals are stable.  Patient is essentially neurovascularly intact on exam.  No concern for systemic symptoms almost sepsis, or abscess.    Diagnostic testing results were independently reviewed and interpreted, which were discussed in depth with patient.   Test ordered in clinic: none  Additionally, previous progress notes/admissions/lab were reviewed and interpreted.  [facility] 05/02/2022  and 10/07/2020 with Good kidney function and EGFR.    Plan:     Treat with Augmentin course.  Patient tolerates penicillin but has allergy to Rocephin injection previously.  Also recommended oral OTC antihistamine for her allergic reaction/itching.    Offered Tdap since she is out of date but she declined.  She has follow-up scheduled PCP in 2 weeks.    Cellulitis of left breast  -     amoxicillin-clavulanate 875-125mg (AUGMENTIN) 875-125 mg per tablet; Take 1 tablet by mouth every 12 (twelve) hours. for 7 days  Dispense: 14 tablet; Refill: 0    Allergic reaction to insect bite  -     cetirizine (ZYRTEC) 10 MG tablet; Take 1 tablet (10 mg total) by mouth daily as needed (itching).  Dispense: 30 tablet; Refill: 0    Pruritus  -     cetirizine (ZYRTEC) 10 MG tablet; Take 1 tablet (10 mg total) by mouth daily as needed (itching).  Dispense: 30 tablet; Refill: 0      Note dictated with voice recognition software, please excuse any grammatical errors.    We had shared decision making for patient's treatment. We discussed side effects/alternatives/benefits/risk and patient would like to proceed with treatment plan. We also discussed other OTC treatment recommendations.    Patient was counseled expected course and answered all of questions.     Patient was instructed to return for re-evaluation with urgent care or PCP for continued outpatient workup and management if symptoms do not improve/worsening symptoms. Strict ED versus clinic precautions given in depth.   Guideline, discharge and follow-up instructions given verbally/printed; patient will also receive via [employer]. Patient verbalized understanding and agreed with the entirety of plan of care.         Additional MDM:     Heart Failure Score:   COPD = No          Patient Instructions   PLEASE READ YOUR DISCHARGE INSTRUCTIONS ENTIRELY AS IT CONTAINS IMPORTANT INFORMATION.    Please take the antibiotics to completion. Please supplement with OTC probiotics and yogurt.      Take OTC Tylenol or anti-inflammatory as needed for pain. If no contraindication.    Take  Zyrtec in the morning as needed for itching/allergic reaction and take Benadryl at night.  Use caution as it may cause drowsiness or constipation with these medication.    Please return or see your primary care doctor for signs of worsening infection (purulent drainage, fever, worsening swelling/redness/pain, inability to move your extremity).        Please arrange follow up with your primary medical clinic as soon as possible. You must understand that you've received an Urgent Care treatment only and that you may be released before all of your medical problems are known or treated. You, the patient, will arrange for follow up as instructed. If your symptoms worsen or fail to improve you should go to the Emergency Room.    WE CANNOT RULE OUT ALL POSSIBLE CAUSES OF YOUR SYMPTOMS IN THE URGENT CARE SETTING PLEASE GO TO THE ER IF YOU FEELS YOUR CONDITION IS WORSENING OR YOU WOULD LIKE EMERGENT EVALUATION.      Follow-up care  Follow up with your healthcare provider as advised. If you have stitches or staples, be sure to return as directed to have them removed.    When to seek medical advice  Call your healthcare provider right away if any of these occur:  Wound bleeding not controlled by direct pressure  Signs of infection, including increasing pain in the wound, increasing wound redness or swelling, or pus or bad odor coming from the wound  Fever of 100.4°F (38.ºC) or as directed by your health care provider  Wound edges re-open  Wound changes colors  Numbness or weakness in the affected hand   Decreased movement of the hand  Date Last Reviewed: 6/10/2015  © 4591-4125 [employer]. [address] [place] [address], [place] [address]. All rights reserved. This information is not intended as a substitute for professional medical care. Always follow your healthcare professional's instructions.

## 2023-08-30 ENCOUNTER — PATIENT MESSAGE (OUTPATIENT)
Dept: OBSTETRICS AND GYNECOLOGY | Facility: CLINIC | Age: 33
End: 2023-08-30
Payer: MEDICAID

## 2023-09-16 DIAGNOSIS — F51.01 PRIMARY INSOMNIA: ICD-10-CM

## 2023-09-16 NOTE — TELEPHONE ENCOUNTER
No care due was identified.  [facility] Embedded Care Due Messages. Reference number: [id number].   9/16/2023 4:14:04 AM CDT

## 2023-09-17 RX ORDER — TRAZODONE HYDROCHLORIDE 50 MG/1
TABLET ORAL
Qty: 60 TABLET | Refills: 0 | Status: SHIPPED | OUTPATIENT
Start: 2023-09-17 | End: 2024-03-19 | Stop reason: SDUPTHER

## 2023-09-17 NOTE — TELEPHONE ENCOUNTER
Refill Decision Note   Purvi Ann  is requesting a refill authorization.  Brief Assessment and Rationale for Refill:  Approve     Medication Therapy Plan:         Comments:     Note composed:2:44 AM 09/17/2023

## 2023-09-19 ENCOUNTER — LAB VISIT (OUTPATIENT)
Dept: LAB | Facility: OTHER | Age: 33
End: 2023-09-19
Attending: FAMILY MEDICINE
Payer: MEDICAID

## 2023-09-19 ENCOUNTER — PATIENT MESSAGE (OUTPATIENT)
Dept: OBSTETRICS AND GYNECOLOGY | Facility: CLINIC | Age: 33
End: 2023-09-19
Payer: MEDICAID

## 2023-09-19 DIAGNOSIS — R11.0 NAUSEA: Primary | ICD-10-CM

## 2023-09-19 DIAGNOSIS — R11.0 NAUSEA: ICD-10-CM

## 2023-09-19 LAB — HCG INTACT+B SERPL-ACNC: <1.2 MIU/ML

## 2023-09-19 PROCEDURE — 36415 COLL VENOUS BLD VENIPUNCTURE: CPT | Performed by: FAMILY MEDICINE

## 2023-09-19 PROCEDURE — 84702 CHORIONIC GONADOTROPIN TEST: CPT | Performed by: FAMILY MEDICINE

## 2023-09-20 ENCOUNTER — TELEPHONE (OUTPATIENT)
Dept: OBSTETRICS AND GYNECOLOGY | Facility: CLINIC | Age: 33
End: 2023-09-20
Payer: MEDICAID

## 2023-09-20 NOTE — TELEPHONE ENCOUNTER
----- Message from Yanet Figueroa MA sent at 9/19/2023  5:44 PM CDT -----    ----- Message -----  From: Waqas Decker NP  Sent: 9/19/2023   3:52 PM CDT  To: Jeronimo Alan Staff    HCG normal, can offer appt to discuss her other symptoms

## 2023-09-28 ENCOUNTER — HOSPITAL ENCOUNTER (OUTPATIENT)
Dept: RADIOLOGY | Facility: OTHER | Age: 33
Discharge: HOME | End: 2023-09-28
Attending: STUDENT IN AN ORGANIZED HEALTH CARE EDUCATION/TRAINING PROGRAM
Payer: MEDICAID

## 2023-09-28 ENCOUNTER — OFFICE VISIT (OUTPATIENT)
Dept: INTERNAL MEDICINE | Facility: CLINIC | Age: 33
End: 2023-09-28
Payer: MEDICAID

## 2023-09-28 VITALS
OXYGEN SATURATION: 99 % | WEIGHT: 247.38 LBS | BODY MASS INDEX: 39.76 KG/M2 | HEIGHT: 66 IN | SYSTOLIC BLOOD PRESSURE: 110 MMHG | HEART RATE: 76 BPM | DIASTOLIC BLOOD PRESSURE: 76 MMHG

## 2023-09-28 DIAGNOSIS — K59.00 CONSTIPATION, UNSPECIFIED CONSTIPATION TYPE: ICD-10-CM

## 2023-09-28 DIAGNOSIS — M79.661 RIGHT CALF PAIN: ICD-10-CM

## 2023-09-28 DIAGNOSIS — Z00.00 ANNUAL PHYSICAL EXAM: Primary | ICD-10-CM

## 2023-09-28 DIAGNOSIS — E66.09 CLASS 2 OBESITY DUE TO EXCESS CALORIES WITHOUT SERIOUS COMORBIDITY WITH BODY MASS INDEX (BMI) OF 39.0 TO 39.9 IN ADULT: ICD-10-CM

## 2023-09-28 DIAGNOSIS — D75.839 THROMBOCYTHEMIA: ICD-10-CM

## 2023-09-28 DIAGNOSIS — E66.9 OBESITY (BMI 35.0-39.9 WITHOUT COMORBIDITY): ICD-10-CM

## 2023-09-28 PROCEDURE — 74018 RADEX ABDOMEN 1 VIEW: CPT | Mod: TC,FY

## 2023-09-28 PROCEDURE — 74018 RADEX ABDOMEN 1 VIEW: CPT | Mod: 26,,, | Performed by: RADIOLOGY

## 2023-09-28 PROCEDURE — 1159F PR MEDICATION LIST DOCUMENTED IN MEDICAL RECORD: ICD-10-PCS | Mod: CPTII,,, | Performed by: STUDENT IN AN ORGANIZED HEALTH CARE EDUCATION/TRAINING PROGRAM

## 2023-09-28 PROCEDURE — 93971 EXTREMITY STUDY: CPT | Mod: TC,RT

## 2023-09-28 PROCEDURE — 93971 US LOWER EXTREMITY VEINS RIGHT: ICD-10-PCS | Mod: 26,RT,, | Performed by: RADIOLOGY

## 2023-09-28 PROCEDURE — 99214 OFFICE O/P EST MOD 30 MIN: CPT | Mod: S$PBB,,, | Performed by: STUDENT IN AN ORGANIZED HEALTH CARE EDUCATION/TRAINING PROGRAM

## 2023-09-28 PROCEDURE — 3044F HG A1C LEVEL LT 7.0%: CPT | Mod: CPTII,,, | Performed by: STUDENT IN AN ORGANIZED HEALTH CARE EDUCATION/TRAINING PROGRAM

## 2023-09-28 PROCEDURE — 3078F PR MOST RECENT DIASTOLIC BLOOD PRESSURE < 80 MM HG: ICD-10-PCS | Mod: CPTII,,, | Performed by: STUDENT IN AN ORGANIZED HEALTH CARE EDUCATION/TRAINING PROGRAM

## 2023-09-28 PROCEDURE — 3008F BODY MASS INDEX DOCD: CPT | Mod: CPTII,,, | Performed by: STUDENT IN AN ORGANIZED HEALTH CARE EDUCATION/TRAINING PROGRAM

## 2023-09-28 PROCEDURE — 3078F DIAST BP <80 MM HG: CPT | Mod: CPTII,,, | Performed by: STUDENT IN AN ORGANIZED HEALTH CARE EDUCATION/TRAINING PROGRAM

## 2023-09-28 PROCEDURE — 3074F PR MOST RECENT SYSTOLIC BLOOD PRESSURE < 130 MM HG: ICD-10-PCS | Mod: CPTII,,, | Performed by: STUDENT IN AN ORGANIZED HEALTH CARE EDUCATION/TRAINING PROGRAM

## 2023-09-28 PROCEDURE — 93971 EXTREMITY STUDY: CPT | Mod: 26,RT,, | Performed by: RADIOLOGY

## 2023-09-28 PROCEDURE — 99999 PR PBB SHADOW E&M-EST. PATIENT-LVL V: ICD-10-PCS | Mod: PBBFAC,,, | Performed by: STUDENT IN AN ORGANIZED HEALTH CARE EDUCATION/TRAINING PROGRAM

## 2023-09-28 PROCEDURE — 99215 OFFICE O/P EST HI 40 MIN: CPT | Mod: PBBFAC,25 | Performed by: STUDENT IN AN ORGANIZED HEALTH CARE EDUCATION/TRAINING PROGRAM

## 2023-09-28 PROCEDURE — 99999 PR PBB SHADOW E&M-EST. PATIENT-LVL V: CPT | Mod: PBBFAC,,, | Performed by: STUDENT IN AN ORGANIZED HEALTH CARE EDUCATION/TRAINING PROGRAM

## 2023-09-28 PROCEDURE — 74018 XR ABDOMEN AP 1 VIEW: ICD-10-PCS | Mod: 26,,, | Performed by: RADIOLOGY

## 2023-09-28 PROCEDURE — 3044F PR MOST RECENT HEMOGLOBIN A1C LEVEL <7.0%: ICD-10-PCS | Mod: CPTII,,, | Performed by: STUDENT IN AN ORGANIZED HEALTH CARE EDUCATION/TRAINING PROGRAM

## 2023-09-28 PROCEDURE — 3008F PR BODY MASS INDEX (BMI) DOCUMENTED: ICD-10-PCS | Mod: CPTII,,, | Performed by: STUDENT IN AN ORGANIZED HEALTH CARE EDUCATION/TRAINING PROGRAM

## 2023-09-28 PROCEDURE — 99214 PR OFFICE/OUTPT VISIT, EST, LEVL IV, 30-39 MIN: ICD-10-PCS | Mod: S$PBB,,, | Performed by: STUDENT IN AN ORGANIZED HEALTH CARE EDUCATION/TRAINING PROGRAM

## 2023-09-28 PROCEDURE — 3074F SYST BP LT 130 MM HG: CPT | Mod: CPTII,,, | Performed by: STUDENT IN AN ORGANIZED HEALTH CARE EDUCATION/TRAINING PROGRAM

## 2023-09-28 PROCEDURE — 1159F MED LIST DOCD IN RCRD: CPT | Mod: CPTII,,, | Performed by: STUDENT IN AN ORGANIZED HEALTH CARE EDUCATION/TRAINING PROGRAM

## 2023-09-28 NOTE — PROGRESS NOTES
"  [facility]    Subjective:       Patient ID: Purvi Ann is a 32 y.o. female.    Chief Complaint: Annual Exam    History was obtained from the patient and supplemented through chart review.  This pt is known to me.    HPI:    Patient is a 32 y.o. 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, shift work sleep difficulties    Constipation  Encouraged clean out and more miralax to take regularlly  More vegetables, more movement    Pt interested in becoming pregnant, encouraged to be healthier first    Anxiety attacks, not sleeping, encouraged more regular routine  Trial trazodone  prn  Discussed healthy sleep habits, trial of 1 1/2 tabs    Has been Stress eating  Doughnuts, candy, sandwich from [employer]  Snacks all day  Encouraged improvement in diet    Severe morbid obesity  Pt does not want additional medications, wants to focus on taking care of herseld " the natural way    Exercise: hx of back and forth dieting  Encouraged more healthy habits in general  Thinking ETOH is main source of calories currentrly    Multiple join pain, knees, calf,   "Hot" calf pain  Check DVTm urgently      Wt Readings from Last 15 Encounters:   09/28/23 112.2 kg (247 lb 5.7 oz)   08/22/23 108 kg (238 lb)   08/01/23 108.4 kg (238 lb 15.7 oz)   05/02/23 110.3 kg (243 lb 2.7 oz)   12/01/22 101.6 kg (224 lb)   08/10/22 110.6 kg (243 lb 13.3 oz)   07/13/22 112 kg (247 lb)   05/02/22 112.3 kg (247 lb 9.2 oz)   04/05/22 109.8 kg (242 lb 1 oz)   06/15/21 115.2 kg (253 lb 15.5 oz)   06/10/21 112.5 kg (248 lb)   03/30/21 112.2 kg (247 lb 5.7 oz)   02/26/21 109.1 kg (240 lb 8.4 oz)   02/11/21 110.2 kg (243 lb)   02/09/21 112.5 kg (248 lb)      Medical History  Past Medical History:   Diagnosis Date    Allergy     HSV anogenital infection 2022    Recurrent upper respiratory infection (URI)        Review of Systems   Constitutional:  Negative for appetite change, diaphoresis and fever.   HENT:  Negative " for rhinorrhea and trouble swallowing.    Respiratory:  Negative for shortness of breath.    Cardiovascular:  Negative for chest pain.   Gastrointestinal:  Positive for constipation (every 2-3 days). Negative for diarrhea, nausea and vomiting.   Genitourinary:  Negative for hematuria.   Musculoskeletal:  Positive for arthralgias and myalgias. Negative for gait problem.        Right calf pain hot, hard   Neurological:  Positive for weakness. Negative for dizziness and seizures.   Psychiatric/Behavioral:  Positive for dysphoric mood and sleep disturbance. Negative for hallucinations. The patient is nervous/anxious.          Surgical hx, family hx, social hx   Dad- with coon cancer age 46  MOM- DM, HTN  ETOH: 3/evening x weekends only, 1 if driving  Sex: men none now  Have been reviewed      Current Outpatient Medications:     albuterol (PROVENTIL/VENTOLIN HFA) 90 mcg/actuation inhaler, Inhale 1-2 puffs into the lungs every 6 (six) hours as needed for Wheezing or Shortness of Breath. Rescue, Disp: 6.7 g, Rfl: 0    azelastine (ASTELIN) 137 mcg (0.1 %) nasal spray, 1-2 sprays (137-274 mcg total) by Nasal route 2 (two) times daily as needed for Rhinitis., Disp: 30 mL, Rfl: 5    cetirizine (ZYRTEC) 10 MG tablet, Take 10 mg by mouth once daily., Disp: , Rfl:     cetirizine (ZYRTEC) 10 MG tablet, Take 1 tablet (10 mg total) by mouth daily as needed (itching)., Disp: 30 tablet, Rfl: 0    diphenhydrAMINE (BENADRYL) 12.5 mg/5 mL elixir, Take by mouth every 8 (eight) hours., Disp: , Rfl:     famotidine (PEPCID) 20 MG tablet, Take 20 mg by mouth., Disp: , Rfl:     fluticasone propionate (FLONASE) 50 mcg/actuation nasal spray, 1 spray (50 mcg total) by Each Nostril route 2 (two) times daily as needed., Disp: 15 g, Rfl: 0    ibuprofen (ADVIL,MOTRIN) 600 MG tablet, Take 1 tablet (600 mg total) by mouth every 6 (six) hours as needed for Pain., Disp: 20 tablet, Rfl: 0    traZODone (DESYREL) 50 MG tablet, TAKE 1 TO 2 TABLETS BY MOUTH  "AS NEEDED FOR SLEEP. WILL CAUSE DROWSINESS., Disp: 60 tablet, Rfl: 0    azithromycin (Z-PATRICIA) 250 MG tablet, Take 1 tablet (250 mg total) by mouth once daily. Take first 2 tablets together, then 1 every day until finished. (Patient not taking: Reported on 5/2/2023), Disp: 6 tablet, Rfl: 0    cyclobenzaprine (FLEXERIL) 5 MG tablet, Take 1 tablet (5 mg total) by mouth 3 (three) times daily as needed. (Patient not taking: Reported on 8/1/2023), Disp: 30 tablet, Rfl: 0    fluconazole (DIFLUCAN) 150 MG Tab, TAKE 1 TABLET BY MOUTH ONCE. REFILL AND REDOSE IF SYMPTOMS RECUR FOR 1 DOSE, Disp: , Rfl:     polyethylene glycol (GLYCOLAX) 17 gram PwPk, Take 17 g by mouth once daily. (Patient not taking: Reported on 8/1/2023), Disp: 100 each, Rfl: 3    valACYclovir (VALTREX) 500 MG tablet, Take 1 tablet (500 mg total) by mouth 2 (two) times daily. for 5 days, Disp: 30 tablet, Rfl: 3    Objective:        Body mass index is 39.92 kg/m².  Vitals:    09/28/23 1005   BP: 110/76   Pulse: 76   SpO2: 99%   Weight: 112.2 kg (247 lb 5.7 oz)   Height: 5' 6" (1.676 m)   PainSc: 0-No pain     Physical Exam  Vitals and nursing note reviewed.   Constitutional:       General: She is not in acute distress.     Appearance: Normal appearance. She is not ill-appearing.   HENT:      Head: Normocephalic and atraumatic.      Nose:      Comments: Wearing a mask  Eyes:      General: No scleral icterus.  Pulmonary:      Effort: Pulmonary effort is normal.   Abdominal:      General: There is no distension.   Musculoskeletal:         General: Tenderness present. No deformity.      Cervical back: Normal range of motion.      Comments: Neg homans signs   Skin:     General: Skin is warm and dry.   Neurological:      Mental Status: She is alert and oriented to person, place, and time.   Psychiatric:         Behavior: Behavior normal.           Lab Results   Component Value Date    WBC 9.10 09/28/2023    HGB 11.9 (L) 09/28/2023    HCT 37.7 09/28/2023     " 09/28/2023    CHOL 163 09/28/2023    TRIG 125 09/28/2023    HDL 39 (L) 09/28/2023    ALT 14 09/28/2023    AST 16 09/28/2023     09/28/2023    K 3.8 09/28/2023     09/28/2023    CREATININE 0.9 09/28/2023    BUN 10 09/28/2023    CO2 26 09/28/2023    TSH 1.303 09/28/2023    HGBA1C 5.3 09/28/2023       The ASCVD Risk score (Dionisio BOUDREAUX, et al., 2019) failed to calculate for the following reasons:    The 2019 ASCVD risk score is only valid for ages 40 to 79    (Imaging have been independently reviewed)    Reviewd us and abdom xray    Assessment:         1. Annual physical exam    2. Right calf pain    3. Obesity (BMI 35.0-39.9 without comorbidity)    4. Class 2 obesity due to excess calories without serious comorbidity with body mass index (BMI) of 39.0 to 39.9 in adult    5. Thrombocythemia    6. Constipation, unspecified constipation type            Plan:     Purvi was seen today for annual exam.    Diagnoses and all orders for this visit:    Annual physical exam  -     Comprehensive Metabolic Panel; Future  -     Lipid Panel; Future  -     TSH; Future  -     CBC Auto Differential; Future  -     Hemoglobin A1C; Future    Right calf pain  -     Cancel: US Lower Extremity Veins Right; Future  -     US Lower Extremity Veins Right; Future    Obesity (BMI 35.0-39.9 without comorbidity)    Class 2 obesity due to excess calories without serious comorbidity with body mass index (BMI) of 39.0 to 39.9 in adult    Thrombocythemia  -     Ferritin; Future  -     Folate; Future  -     Iron and TIBC; Future  -     Vitamin B12; Future    Constipation, unspecified constipation type  -     Ferritin; Future  -     Folate; Future  -     Iron and TIBC; Future  -     Vitamin B12; Future  -     X-Ray Abdomen AP 1 View; Future          Health Maintenance  - Lipids: normal 10/2020  - A1C: fasting glucose 87 5/2/2022  - Colon Ca Screen: N/A due to age  - Immunizations: refused    Women's health  - Pap: [place] NILM and neg HPV  3/30/2021, + BV   - Contraception: trying to get pregnant, counseled on folic acid    Follow up in about 3 months (around 12/28/2023) for weight nutrition f/u.        30 min were used in chart review, evaluation and counseling of patient, documentation and review of results on same day of service     Also prevented ED visit with calf hard, hot pain, stat US to rule out DVT    All medications were reviewed including potential side effects and risks/benefits.  Pt was counseled to call back if anything worsens or if questions arise.    Waqas Humphries MD  Family Medicine  [facility]  [address]  [address]  [place] [address]  Phone [phone]  Fax [phone]

## 2023-09-28 NOTE — PATIENT INSTRUCTIONS
Constipation    Good Bowel Movements Need:  1) Water   2) Fiber   3) Movement      Clean-Out to use rarely  1) Drink half a bottle of liquid magnesium citrate, wait an hour and if not much output, drink the other half. **      OR  2) Use a capful of miralax (or packet) in a tall glass of water or sugar-free gatorade every 15-20 min for approx 2-4 hours (about 10-14 doses)      OR  3) Use lactulose three times a day as prescribed      AND you can  Use an enema to help get the stool going but you need something from the top, too    Your colon dilates after being impacted and can just fill up again.  You have to keep the stool moving so it doesn't build up in your colon.    Maintenance   1) Drink water at least 64 ounces a day  3). Also, you can use miralax. Once or twice a day. Generic is fine. It doesn't have a taste, smell or texture. This provides bulk for the bowels.  4). Sparingly, you can use senna to help it move. This sends the signal for the bowels to PUSH.  5). Magnesium 400 mg nightly can help with anxiety, sleep and bowel movements (I.E. Calm)    You can take a probiotic daily. I recommend this if you are having diarrhea especially. (Culturelle is expensive, acidophilus or lactobacillus are cheaper optioins.) Always take it when on antibiotics and especially when your abdomen is unhappy.  Other times are optional.

## 2023-10-05 ENCOUNTER — PATIENT MESSAGE (OUTPATIENT)
Dept: INTERNAL MEDICINE | Facility: CLINIC | Age: 33
End: 2023-10-05
Payer: MEDICAID

## 2023-10-05 DIAGNOSIS — F43.9 STRESS: ICD-10-CM

## 2023-10-05 DIAGNOSIS — F41.9 ANXIETY: Primary | ICD-10-CM

## 2023-10-30 ENCOUNTER — OFFICE VISIT (OUTPATIENT)
Dept: INTERNAL MEDICINE | Facility: CLINIC | Age: 33
End: 2023-10-30
Payer: MEDICAID

## 2023-10-30 VITALS
HEART RATE: 90 BPM | SYSTOLIC BLOOD PRESSURE: 104 MMHG | OXYGEN SATURATION: 98 % | DIASTOLIC BLOOD PRESSURE: 76 MMHG | HEIGHT: 66 IN | BODY MASS INDEX: 39.04 KG/M2 | WEIGHT: 242.94 LBS

## 2023-10-30 DIAGNOSIS — Z56.6 STRESS AT WORK: ICD-10-CM

## 2023-10-30 DIAGNOSIS — K59.00 CONSTIPATION, UNSPECIFIED CONSTIPATION TYPE: Primary | ICD-10-CM

## 2023-10-30 DIAGNOSIS — L98.8 SKIN LESION OF BREAST: ICD-10-CM

## 2023-10-30 PROCEDURE — 99214 OFFICE O/P EST MOD 30 MIN: CPT | Mod: S$PBB,,, | Performed by: PHYSICIAN ASSISTANT

## 2023-10-30 PROCEDURE — 3074F PR MOST RECENT SYSTOLIC BLOOD PRESSURE < 130 MM HG: ICD-10-PCS | Mod: CPTII,,, | Performed by: PHYSICIAN ASSISTANT

## 2023-10-30 PROCEDURE — 3044F HG A1C LEVEL LT 7.0%: CPT | Mod: CPTII,,, | Performed by: PHYSICIAN ASSISTANT

## 2023-10-30 PROCEDURE — 3078F PR MOST RECENT DIASTOLIC BLOOD PRESSURE < 80 MM HG: ICD-10-PCS | Mod: CPTII,,, | Performed by: PHYSICIAN ASSISTANT

## 2023-10-30 PROCEDURE — 3074F SYST BP LT 130 MM HG: CPT | Mod: CPTII,,, | Performed by: PHYSICIAN ASSISTANT

## 2023-10-30 PROCEDURE — 3008F BODY MASS INDEX DOCD: CPT | Mod: CPTII,,, | Performed by: PHYSICIAN ASSISTANT

## 2023-10-30 PROCEDURE — 1159F MED LIST DOCD IN RCRD: CPT | Mod: CPTII,,, | Performed by: PHYSICIAN ASSISTANT

## 2023-10-30 PROCEDURE — 99214 PR OFFICE/OUTPT VISIT, EST, LEVL IV, 30-39 MIN: ICD-10-PCS | Mod: S$PBB,,, | Performed by: PHYSICIAN ASSISTANT

## 2023-10-30 PROCEDURE — 3078F DIAST BP <80 MM HG: CPT | Mod: CPTII,,, | Performed by: PHYSICIAN ASSISTANT

## 2023-10-30 PROCEDURE — 3008F PR BODY MASS INDEX (BMI) DOCUMENTED: ICD-10-PCS | Mod: CPTII,,, | Performed by: PHYSICIAN ASSISTANT

## 2023-10-30 PROCEDURE — 3044F PR MOST RECENT HEMOGLOBIN A1C LEVEL <7.0%: ICD-10-PCS | Mod: CPTII,,, | Performed by: PHYSICIAN ASSISTANT

## 2023-10-30 PROCEDURE — 99999 PR PBB SHADOW E&M-EST. PATIENT-LVL IV: ICD-10-PCS | Mod: PBBFAC,,, | Performed by: PHYSICIAN ASSISTANT

## 2023-10-30 PROCEDURE — 99999 PR PBB SHADOW E&M-EST. PATIENT-LVL IV: CPT | Mod: PBBFAC,,, | Performed by: PHYSICIAN ASSISTANT

## 2023-10-30 PROCEDURE — 99214 OFFICE O/P EST MOD 30 MIN: CPT | Mod: PBBFAC | Performed by: PHYSICIAN ASSISTANT

## 2023-10-30 PROCEDURE — 1159F PR MEDICATION LIST DOCUMENTED IN MEDICAL RECORD: ICD-10-PCS | Mod: CPTII,,, | Performed by: PHYSICIAN ASSISTANT

## 2023-10-30 RX ORDER — DOCUSATE SODIUM 100 MG/1
100 CAPSULE, LIQUID FILLED ORAL 2 TIMES DAILY
Qty: 60 CAPSULE | Refills: 11 | Status: SHIPPED | OUTPATIENT
Start: 2023-10-30 | End: 2024-10-29

## 2023-10-30 RX ORDER — MUPIROCIN 20 MG/G
OINTMENT TOPICAL 3 TIMES DAILY
Qty: 11.1 G | Refills: 0 | Status: SHIPPED | OUTPATIENT
Start: 2023-10-30

## 2023-10-30 SDOH — SOCIAL DETERMINANTS OF HEALTH (SDOH): OTHER PHYSICAL AND MENTAL STRAIN RELATED TO WORK: Z56.6
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
## 2023-11-03 ENCOUNTER — PATIENT MESSAGE (OUTPATIENT)
Dept: BEHAVIORAL HEALTH | Facility: CLINIC | Age: 33
End: 2023-11-03
Payer: MEDICAID

## 2023-12-19 ENCOUNTER — PATIENT MESSAGE (OUTPATIENT)
Dept: BEHAVIORAL HEALTH | Facility: CLINIC | Age: 33
End: 2023-12-19
Payer: MEDICAID

## 2023-12-19 ENCOUNTER — TELEPHONE (OUTPATIENT)
Dept: BEHAVIORAL HEALTH | Facility: CLINIC | Age: 33
End: 2023-12-19
Payer: MEDICAID

## 2023-12-19 ENCOUNTER — OFFICE VISIT (OUTPATIENT)
Dept: INTERNAL MEDICINE | Facility: CLINIC | Age: 33
End: 2023-12-19
Payer: MEDICAID

## 2023-12-19 VITALS
SYSTOLIC BLOOD PRESSURE: 108 MMHG | BODY MASS INDEX: 40.67 KG/M2 | WEIGHT: 253.06 LBS | HEART RATE: 75 BPM | DIASTOLIC BLOOD PRESSURE: 76 MMHG | OXYGEN SATURATION: 98 % | HEIGHT: 66 IN

## 2023-12-19 DIAGNOSIS — Z91.89 HAS POORLY BALANCED DIET: ICD-10-CM

## 2023-12-19 DIAGNOSIS — E66.01 CLASS 3 SEVERE OBESITY DUE TO EXCESS CALORIES WITHOUT SERIOUS COMORBIDITY WITH BODY MASS INDEX (BMI) OF 40.0 TO 44.9 IN ADULT: Primary | ICD-10-CM

## 2023-12-19 DIAGNOSIS — K59.00 CONSTIPATION, UNSPECIFIED CONSTIPATION TYPE: Chronic | ICD-10-CM

## 2023-12-19 PROCEDURE — 99999 PR PBB SHADOW E&M-EST. PATIENT-LVL IV: ICD-10-PCS | Mod: PBBFAC,,, | Performed by: STUDENT IN AN ORGANIZED HEALTH CARE EDUCATION/TRAINING PROGRAM

## 2023-12-19 PROCEDURE — 99214 OFFICE O/P EST MOD 30 MIN: CPT | Mod: PBBFAC | Performed by: STUDENT IN AN ORGANIZED HEALTH CARE EDUCATION/TRAINING PROGRAM

## 2023-12-19 PROCEDURE — 99999 PR PBB SHADOW E&M-EST. PATIENT-LVL IV: CPT | Mod: PBBFAC,,, | Performed by: STUDENT IN AN ORGANIZED HEALTH CARE EDUCATION/TRAINING PROGRAM

## 2023-12-19 PROCEDURE — 3008F PR BODY MASS INDEX (BMI) DOCUMENTED: ICD-10-PCS | Mod: CPTII,,, | Performed by: STUDENT IN AN ORGANIZED HEALTH CARE EDUCATION/TRAINING PROGRAM

## 2023-12-19 PROCEDURE — 3008F BODY MASS INDEX DOCD: CPT | Mod: CPTII,,, | Performed by: STUDENT IN AN ORGANIZED HEALTH CARE EDUCATION/TRAINING PROGRAM

## 2023-12-19 PROCEDURE — 3044F PR MOST RECENT HEMOGLOBIN A1C LEVEL <7.0%: ICD-10-PCS | Mod: CPTII,,, | Performed by: STUDENT IN AN ORGANIZED HEALTH CARE EDUCATION/TRAINING PROGRAM

## 2023-12-19 PROCEDURE — 99213 OFFICE O/P EST LOW 20 MIN: CPT | Mod: S$PBB,,, | Performed by: STUDENT IN AN ORGANIZED HEALTH CARE EDUCATION/TRAINING PROGRAM

## 2023-12-19 PROCEDURE — 3078F DIAST BP <80 MM HG: CPT | Mod: CPTII,,, | Performed by: STUDENT IN AN ORGANIZED HEALTH CARE EDUCATION/TRAINING PROGRAM

## 2023-12-19 PROCEDURE — 1159F MED LIST DOCD IN RCRD: CPT | Mod: CPTII,,, | Performed by: STUDENT IN AN ORGANIZED HEALTH CARE EDUCATION/TRAINING PROGRAM

## 2023-12-19 PROCEDURE — 3078F PR MOST RECENT DIASTOLIC BLOOD PRESSURE < 80 MM HG: ICD-10-PCS | Mod: CPTII,,, | Performed by: STUDENT IN AN ORGANIZED HEALTH CARE EDUCATION/TRAINING PROGRAM

## 2023-12-19 PROCEDURE — 3044F HG A1C LEVEL LT 7.0%: CPT | Mod: CPTII,,, | Performed by: STUDENT IN AN ORGANIZED HEALTH CARE EDUCATION/TRAINING PROGRAM

## 2023-12-19 PROCEDURE — 3074F SYST BP LT 130 MM HG: CPT | Mod: CPTII,,, | Performed by: STUDENT IN AN ORGANIZED HEALTH CARE EDUCATION/TRAINING PROGRAM

## 2023-12-19 PROCEDURE — 3074F PR MOST RECENT SYSTOLIC BLOOD PRESSURE < 130 MM HG: ICD-10-PCS | Mod: CPTII,,, | Performed by: STUDENT IN AN ORGANIZED HEALTH CARE EDUCATION/TRAINING PROGRAM

## 2023-12-19 PROCEDURE — 99213 PR OFFICE/OUTPT VISIT, EST, LEVL III, 20-29 MIN: ICD-10-PCS | Mod: S$PBB,,, | Performed by: STUDENT IN AN ORGANIZED HEALTH CARE EDUCATION/TRAINING PROGRAM

## 2023-12-19 PROCEDURE — 1159F PR MEDICATION LIST DOCUMENTED IN MEDICAL RECORD: ICD-10-PCS | Mod: CPTII,,, | Performed by: STUDENT IN AN ORGANIZED HEALTH CARE EDUCATION/TRAINING PROGRAM

## 2023-12-19 NOTE — PROGRESS NOTES
Behavioral Health Community Health Worker  Initial Assessment  Completed by:  Judy Fitch    Date:  12/19/2023    Patient Enrollment in Behavioral Health Program:  Patient verbalized understanding of Behavioral Health Integration services to include:  Patient understands that CHW, LCSW, PharmD and consulting Psychiatrist are members of the care team working collaboratively with his/her primary care provider: Yes  Patient understands that activation of their [facility] patient portal account is required for accessing the full scope of team services: Yes  Patient understands that some counseling sessions may occur via video: Yes  Clinic visits with the psychiatrist may be subject to a co-pay based on your insurance: Yes  Patient consents to enroll in BHI program: Yes    Assessments     Single Item Health Literacy Scale:  How often do you need to have someone help you read instructions, pamphlets or other written material from your doctor or pharmacy?: Never    Promis 10:      12/19/2023    12:53 PM   PROMIS-10 Questionnaire Scores   Global Physical Health 12   Global Mental health Score 9     Depression PHQ:      12/19/2023    12:52 PM   PHQ9   Little interest or pleasure in doing things 1   Feeling down, depressed, or hopeless 1   Trouble falling or staying asleep, or sleeping too much 1   Feeling tired or having little energy 1   Poor appetite or overeating 3   Feeling bad about yourself - or that you are a failure or have let yourself or your family down 1   Trouble concentrating on things, such as reading the newspaper or watching television 1   Moving or speaking so slowly that other people could have noticed. Or the opposite - being so fidgety or restless that you have been moving around a lot more than usual 1   PHQ-9 Total Score 10         Generalized Anxiety Disorder 7-Item Scale:      12/19/2023    12:51 PM   GAD7   1. Feeling nervous, anxious, or on edge? 1   2. Not being able to stop or control worrying?  1   3. Worrying too much about different things? 1   4. Trouble relaxing? 1   5. Being so restless that it is hard to sit still? 1   6. Becoming easily annoyed or irritable? 1   7. Feeling afraid as if something awful might happen? 1   GIANA-7 Score 7       History     Social History     Socioeconomic History    Marital status: Single   Tobacco Use    Smoking status: Never    Smokeless tobacco: Never   Substance and Sexual Activity    Alcohol use: Yes     Comment: social    Drug use: No    Sexual activity: Yes     Partners: Male     Birth control/protection: None, Condom     Social Determinants of Health     Financial Resource Strain: Low Risk  (6/30/2021)    Overall Financial Resource Strain (CARDIA)     Difficulty of Paying Living Expenses: Not very hard   Food Insecurity: No Food Insecurity (6/30/2021)    Hunger Vital Sign     Worried About Running Out of Food in the Last Year: Never true     Ran Out of Food in the Last Year: Never true   Transportation Needs: No Transportation Needs (6/30/2021)    PRAPARE - Transportation     Lack of Transportation (Medical): No     Lack of Transportation (Non-Medical): No   Physical Activity: Unknown (6/30/2021)    Exercise Vital Sign     Days of Exercise per Week: Patient declined     Minutes of Exercise per Session: Patient declined   Stress: No Stress Concern Present (6/30/2021)    [employer] [place] [employer] - Occupational Stress Questionnaire     Feeling of Stress : Only a little   Social Connections: Unknown (6/30/2021)    Social Connection and Isolation Panel [NHANES]     Frequency of Communication with Friends and Family: Patient declined     Frequency of Social Gatherings with Friends and Family: Patient declined     Attends Church Services: Patient declined     Active Member of Clubs or Organizations: Patient declined     Attends Club or Organization Meetings: Patient declined     Marital Status: Patient declined   Housing Stability: Unknown (6/30/2021)     Housing Stability Vital Sign     Unable to Pay for Housing in the Last Year: No     Unstable Housing in the Last Year: No       Call Summary     [facility].

## 2023-12-19 NOTE — PATIENT INSTRUCTIONS
Constipation    Good Bowel Movements Need:  1) Water   2) Fiber   3) Movement      Clean-Out to use rarely  1) Drink half a bottle of liquid magnesium citrate, wait an hour and if not much output, drink the other half. (There are supply issues at stores for it right now.  It's nowhere to be found.)      OR  2) Use a capful of miralax (or packet) in a tall glass of water or sugar-free gatorade every 15-20 min for approx 2-4 hours (about 10-14 doses)      OR  3) Use lactulose three times a day as prescribed      AND you can  Use an enema to help get the stool going but you need something from the top, too    Your colon dilates after being impacted and can just fill up again.  You have to keep the stool moving so it doesn't build up in your colon.    Maintenance   1) Drink water at least 64 ounces a day  2) Eat lots of fiber (vegetables).  You can cheat by using metamucil (I use sugar free).  Use with a full glass of water.  You can do it twice or three times a day. This provides bulk for the bowels.  3). Also, you can use miralax. Once or twice a day. Generic is fine. It doesn't have a taste, smell or texture. This provides bulk for the bowels.  4). Sparingly, you can use senna to help it move. This sends the signal for the bowels to PUSH.  5). Magnesium 400 mg nightly can help with anxiety, sleep and bowel movements (I.E. Calm)    You can take a probiotic daily. I recommend this if you are having diarrhea especially. (Culturelle is expensive, acidophilus or lactobacillus are cheaper optioins.) Always take it when on antibiotics and especially when your abdomen is unhappy.  Other times are optional.    
2306

## 2023-12-19 NOTE — PROGRESS NOTES
"  [facility]    Subjective:       Patient ID: Purvi Ann is a 33 y.o. female.    Chief Complaint: Weight Check (F/u)    History was obtained from the patient and supplemented through chart review.  This pt is known to me.    HPI:    Patient is a 33 y.o. female w/ obesity presents for weight, sleep and constipation f/u    [place] , just worked 19 days straight. Hard to care for self  Food of convinence    Constipation  Discussed diet, water intake  Miralax  Trying to figure out hydration, "still feel thirsty"  [employer] iced coffee today, not feeling well    Pt interested in becoming pregnant, encouraged to be healthier first    Anxiety attacks  Trazodone helps but pt afraid of being dependent  Discussed healthy sleep habits, trial of 1 1/2 tabs    Has been Stress eating  Doughnuts, candy, sandwich from [employer]  Snacks all day  Thinks she doesn't eat enough during the day to have regular bowel movements, educated  Encouraged improvement in diet again, pt working on it, just hard with work    Motivational interviewing, encouragement given  Declines meds  Willing to call for therapy appt, hoping to schedule asap    Does do 2 week veggie cleanse in Jan, but cannot keep up.  Does sleep better and with better BMs at that time.    Severe morbid obesity  Pt does not want additional medications, wants to focus on taking care of herseld " the natural way    Exercise: hx of back and forth dieting  Encouraged more healthy habits in general  Thinking ETOH is main source of calories currently    Wt Readings from Last 15 Encounters:   12/19/23 114.8 kg (253 lb 1.4 oz)   10/30/23 110.2 kg (242 lb 15.2 oz)   09/28/23 112.2 kg (247 lb 5.7 oz)   08/22/23 108 kg (238 lb)   08/01/23 108.4 kg (238 lb 15.7 oz)   05/02/23 110.3 kg (243 lb 2.7 oz)   12/01/22 101.6 kg (224 lb)   08/10/22 110.6 kg (243 lb 13.3 oz)   07/13/22 112 kg (247 lb)   05/02/22 112.3 kg (247 " lb 9.2 oz)   04/05/22 109.8 kg (242 lb 1 oz)   06/15/21 115.2 kg (253 lb 15.5 oz)   06/10/21 112.5 kg (248 lb)   03/30/21 112.2 kg (247 lb 5.7 oz)   02/26/21 109.1 kg (240 lb 8.4 oz)      Medical History  Past Medical History:   Diagnosis Date    Allergy     HSV anogenital infection 2022    Recurrent upper respiratory infection (URI)        Review of Systems   Constitutional:  Positive for fatigue. Negative for appetite change, diaphoresis and fever.   HENT:  Negative for rhinorrhea and trouble swallowing.    Respiratory:  Negative for shortness of breath.    Cardiovascular:  Negative for chest pain.   Gastrointestinal:  Positive for constipation (every 2-3 days). Negative for diarrhea, nausea and vomiting.   Genitourinary:  Negative for hematuria.   Musculoskeletal:  Negative for gait problem.   Neurological:  Negative for dizziness and seizures.   Psychiatric/Behavioral:  Positive for dysphoric mood. Negative for hallucinations.          Surgical hx, family hx, social hx   Dad- with coon cancer age 46  MOM- DM, HTN  ETOH: 3/evening x weekends only, 1 if driving  Sex: men none now  Have been reviewed      Current Outpatient Medications:     albuterol (PROVENTIL/VENTOLIN HFA) 90 mcg/actuation inhaler, Inhale 1-2 puffs into the lungs every 6 (six) hours as needed for Wheezing or Shortness of Breath. Rescue, Disp: 6.7 g, Rfl: 0    cetirizine (ZYRTEC) 10 MG tablet, Take 10 mg by mouth once daily., Disp: , Rfl:     cetirizine (ZYRTEC) 10 MG tablet, Take 1 tablet (10 mg total) by mouth daily as needed (itching)., Disp: 30 tablet, Rfl: 0    cyclobenzaprine (FLEXERIL) 5 MG tablet, Take 1 tablet (5 mg total) by mouth 3 (three) times daily as needed., Disp: 30 tablet, Rfl: 0    diphenhydrAMINE (BENADRYL) 12.5 mg/5 mL elixir, Take by mouth every 8 (eight) hours., Disp: , Rfl:     ibuprofen (ADVIL,MOTRIN) 600 MG tablet, Take 1 tablet (600 mg total) by mouth every 6 (six) hours as needed for Pain., Disp: 20 tablet, Rfl: 0     "traZODone (DESYREL) 50 MG tablet, TAKE 1 TO 2 TABLETS BY MOUTH AS NEEDED FOR SLEEP. WILL CAUSE DROWSINESS., Disp: 60 tablet, Rfl: 0    valACYclovir (VALTREX) 500 MG tablet, Take 1 tablet (500 mg total) by mouth 2 (two) times daily. for 5 days, Disp: 30 tablet, Rfl: 3    azelastine (ASTELIN) 137 mcg (0.1 %) nasal spray, 1-2 sprays (137-274 mcg total) by Nasal route 2 (two) times daily as needed for Rhinitis. (Patient not taking: Reported on 12/19/2023), Disp: 30 mL, Rfl: 5    azithromycin (Z-PATRICIA) 250 MG tablet, Take 1 tablet (250 mg total) by mouth once daily. Take first 2 tablets together, then 1 every day until finished. (Patient not taking: Reported on 5/2/2023), Disp: 6 tablet, Rfl: 0    docusate sodium (COLACE) 100 MG capsule, Take 1 capsule (100 mg total) by mouth 2 (two) times daily. (Patient not taking: Reported on 12/19/2023), Disp: 60 capsule, Rfl: 11    famotidine (PEPCID) 20 MG tablet, Take 20 mg by mouth., Disp: , Rfl:     fluconazole (DIFLUCAN) 150 MG Tab, TAKE 1 TABLET BY MOUTH ONCE. REFILL AND REDOSE IF SYMPTOMS RECUR FOR 1 DOSE, Disp: , Rfl:     fluticasone propionate (FLONASE) 50 mcg/actuation nasal spray, 1 spray (50 mcg total) by Each Nostril route 2 (two) times daily as needed. (Patient not taking: Reported on 12/19/2023), Disp: 15 g, Rfl: 0    mupirocin (BACTROBAN) 2 % ointment, Apply topically 3 (three) times daily. (Patient not taking: Reported on 12/19/2023), Disp: 11.1 g, Rfl: 0    Objective:        Body mass index is 40.85 kg/m².  Vitals:    12/19/23 1206   BP: 108/76   Pulse: 75   SpO2: 98%   Weight: 114.8 kg (253 lb 1.4 oz)   Height: 5' 6" (1.676 m)   PainSc: 0-No pain       Physical Exam  Vitals and nursing note reviewed.   Constitutional:       General: She is not in acute distress.     Appearance: Normal appearance. She is not ill-appearing.   HENT:      Head: Normocephalic and atraumatic.   Eyes:      General: No scleral icterus.  Pulmonary:      Effort: Pulmonary effort is normal. "   Musculoskeletal:         General: No deformity.   Neurological:      Mental Status: She is alert and oriented to person, place, and time.   Psychiatric:         Behavior: Behavior normal.           Lab Results   Component Value Date    WBC 9.10 09/28/2023    HGB 11.9 (L) 09/28/2023    HCT 37.7 09/28/2023     09/28/2023    CHOL 163 09/28/2023    TRIG 125 09/28/2023    HDL 39 (L) 09/28/2023    ALT 14 09/28/2023    AST 16 09/28/2023     09/28/2023    K 3.8 09/28/2023     09/28/2023    CREATININE 0.9 09/28/2023    BUN 10 09/28/2023    CO2 26 09/28/2023    TSH 1.303 09/28/2023    HGBA1C 5.3 09/28/2023       The ASCVD Risk score (Dionisio BOUDREAUX, et al., 2019) failed to calculate for the following reasons:    The 2019 ASCVD risk score is only valid for ages 40 to 79    (Imaging have been independently reviewed)    Reviewd us and abdom xray    Assessment:         1. Class 3 severe obesity due to excess calories without serious comorbidity with body mass index (BMI) of 40.0 to 44.9 in adult    2. Constipation, unspecified constipation type    3. Has poorly balanced diet            Plan:     Purvi was seen today for weight check.    Diagnoses and all orders for this visit:    Class 3 severe obesity due to excess calories without serious comorbidity with body mass index (BMI) of 40.0 to 44.9 in adult  -     Ambulatory referral/consult to Nutrition Services; Future    Constipation, unspecified constipation type  -     Ambulatory referral/consult to Nutrition Services; Future    Has poorly balanced diet          Health Maintenance  - Lipids:   - A1C:   - Colon Ca Screen: N/A due to age  - Immunizations: refused    Women's health  - Pap: [facility] NILM and neg HPV 3/30/2021, + BV   - Contraception: trying to get pregnant, counseled on folic acid    Follow up in about 3 months (around 3/19/2024) for constipation, weight.          All medications were reviewed including potential side effects and risks/benefits.   Pt was counseled to call back if anything worsens or if questions arise.    Waqas Humphries MD  Family Medicine  [facility]  [address] [address]  Phone [phone]  Fax [phone]

## 2023-12-21 ENCOUNTER — TELEPHONE (OUTPATIENT)
Dept: ADMINISTRATIVE | Facility: OTHER | Age: 33
End: 2023-12-21
Payer: MEDICAID

## 2023-12-21 ENCOUNTER — PATIENT MESSAGE (OUTPATIENT)
Dept: ADMINISTRATIVE | Facility: OTHER | Age: 33
End: 2023-12-21
Payer: MEDICAID

## 2023-12-21 NOTE — TELEPHONE ENCOUNTER
formerly [facility]    [address] [facility] [place] [address]    Phone:  [phone]       Thank You for choosing us for your health care visit. We are glad to serve you and happy to provide you with this summary of your visit. Please help us to ensure we have accurate records. If you find anything that needs to be changed, please let our staff know as soon as possible.          Your Demographic Information     Patient Name Sex Van Harley A Male 1950       Ethnic Group Patient Race    Not of  or  Origin White      Your Visit Details     Date & Time Provider Department    2017 11:30 AM Cyndie Townsend MD formerly [facility]      Your To Do List     Follow-Up    Return if symptoms worsen or fail to improve in 1-2 weeks.      Conditions Discussed Today or Order-Related Diagnoses        Comments    URTI (acute upper respiratory infection)    -  Primary       Your Vitals Were     BP Pulse Temp Weight SpO2 BMI    144/96 80 97.4 °F (36.3 °C) (Tympanic) 205 lb 14.6 oz (93.4 kg) 97% 29.54 kg/m2    Smoking Status                   Former Smoker           Medications Prescribed or Re-Ordered Today     promethazine-codeine (PHENERGAN WITH CODEINE) 6.25-10 MG/5ML syrup    Sig - Route: Take 5 mLs by mouth 4 times daily as needed for Cough. - Oral    Class: Normal    Pharmacy: [facility] [address] [place] [address] Ph #: [phone]    azithromycin (ZITHROMAX Z-MARIZOL) 250 MG tablet    Si tablets day one, then 1 tablet days 2-5    Class: Eprescribe    Pharmacy: [facility] [address] [place] [address] Ph #: [phone]      Your Current Medications Are        Disp Refills Start End    senna (SENOKOT) 8.6 MG tablet 60 tablet 1 2016     Sig - Route: Take 1 tablet by mouth nightly. - Oral    aspirin 325 MG tablet 84 tablet 0 2016     Sig - Route: Take 1 tablet by mouth every 12 hours. - Oral    sildenafil  LM with scheduled Nutrition appointment of 1-16-24, and contact number provided for any questions. My Chart message to be sent.   (VIAGRA) 50 MG tablet 10 tablet 0 8/15/2016     Sig: Take one half to one tablet as needed prior to intercourse(no more than one dose daily)    Class: Eprescribe    Lactobacillus (ACIDOPHILUS PO)        Sig - Route: Takes one capsule daily - Oral    Class: Historical Med    lisinopril-hydrochlorothiazide (PRINZIDE,ZESTORETIC) 20-25 MG per tablet 90 tablet 1 2016     Sig - Route: Take 1 tablet by mouth daily. - Oral    Class: Eprescribe    promethazine-codeine (PHENERGAN WITH CODEINE) 6.25-10 MG/5ML syrup 120 mL 0 2017     Sig - Route: Take 5 mLs by mouth 4 times daily as needed for Cough. - Oral    azithromycin (ZITHROMAX Z-MARIZOL) 250 MG tablet 6 tablet 0 2017    Si tablets day one, then 1 tablet days 2-5    Class: Eprescribe    traMADOL (ULTRAM) 50 MG tablet 30 tablet 0 2017     Sig - Route: Take 1 tablet by mouth every 6 hours as needed for Pain. - Oral    methylPREDNISolone (MEDROL, MARIZOL,) 4 MG tablet 1 packet 0 2017     Sig: follow package directions    mupirocin (BACTROBAN) 2 % nasal ointment 10 g 0 2016     Sig: Apply to nares twice daily for 5 days    Class: Script Not Printed    Notes to Pharmacy: bactroban recalled - called pharmacy      Allergies     No Known Allergies      Immunizations History as of 2017     Name Date    Influenza Quadrivalent Preservative Free 2016    Pneumococcal Conjugate 13 Valent 2016      Problem List as of 2017     HTN (hypertension)    Kidney stones    Left knee pain    Arthritis of left knee              Patient Instructions    Antihistamines  Zyrtec (Cetirizine), Allegra (Fexofenadine), Claritin (Loratadine)  Helps runny nose, post-nasal drip, sneezing, itchy watery eyes    Nasal steroid  Fluticasone (Flonase), Triamcinolone (Nasacort)  Reduces inflammation and swelling  Helps runny nose, post-nasal drip, sneezing, itchy watery eyes  Need to use several days before benefit is noted    Expectorant  Guaifenesin  (Mucinex)  Helps loosen mucus  Drink a full glass of water with this medication

## 2024-01-04 ENCOUNTER — PATIENT MESSAGE (OUTPATIENT)
Dept: BEHAVIORAL HEALTH | Facility: CLINIC | Age: 34
End: 2024-01-04
Payer: MEDICAID

## 2024-01-04 ENCOUNTER — OFFICE VISIT (OUTPATIENT)
Dept: BEHAVIORAL HEALTH | Facility: CLINIC | Age: 34
End: 2024-01-04
Payer: MEDICAID

## 2024-01-04 DIAGNOSIS — Z63.6 CAREGIVER STRESS: ICD-10-CM

## 2024-01-04 DIAGNOSIS — F43.21 ADJUSTMENT DISORDER WITH DEPRESSED MOOD: Primary | ICD-10-CM

## 2024-01-04 DIAGNOSIS — Z56.6 STRESS AT WORK: ICD-10-CM

## 2024-01-04 PROCEDURE — 90791 PSYCH DIAGNOSTIC EVALUATION: CPT | Mod: 95,,, | Performed by: SOCIAL WORKER

## 2024-01-04 SDOH — SOCIAL DETERMINANTS OF HEALTH (SDOH): DEPENDENT RELATIVE NEEDING CARE AT HOME: Z63.6

## 2024-01-04 SDOH — SOCIAL DETERMINANTS OF HEALTH (SDOH): OTHER PHYSICAL AND MENTAL STRAIN RELATED TO WORK: Z56.6

## 2024-01-04 NOTE — PROGRESS NOTES
Primary Care Behavioral Health Integration: Initial  Date:  1/4/2024  Referral Source:  Waqas Humphries MD  Type of Visit:  Video Session  Length of Appointment: 60    .  History of Present Illness:  Purvi Ann, a 33 y.o. female with history of  Work Stress  referred by Waqas Humphries MD.  Patient was seen, examined and chart was reviewed.    Met with patient.     Current symptoms:  Depression: dysphoric mood, anhedonia, insomnia, worthlessness/guilt, fatigue, difficulty concentrating, and increased appetite.  Anxiety: excessive worrying, restlessness, panic attacks, obsessions/compulsions, and flashbacks/nightmares.  Sleep: frequent night time awakening.  [place]:  flight of ideas, easy distractibility, and racing thoughts or rumination.  Psychosis: paranoid thinking and denies .        1/4/2024     9:03 AM   PHQ-9 Depression Patient Health Questionnaire   Little interest or pleasure in doing things 1   Feeling down, depressed, or hopeless 1   Trouble falling or staying asleep, or sleeping too much 1   Feeling tired or having little energy 1   Poor appetite or overeating 1   Feeling bad about yourself - or that you are a failure or have let yourself or your family down 1   Trouble concentrating on things, such as reading the newspaper or watching television 1   Moving or speaking so slowly that other people could have noticed. Or the opposite - being so fidgety or restless that you have been moving around a lot more than usual 1          1/4/2024     9:02 AM 12/19/2023    12:51 PM   GAD7   1. Feeling nervous, anxious, or on edge? 1 1   2. Not being able to stop or control worrying? 1 1   3. Worrying too much about different things? 1 1   4. Trouble relaxing? 1 1   5. Being so restless that it is hard to sit still? 1 1   6. Becoming easily annoyed or irritable? 1 1   7. Feeling afraid as if something awful might happen? 3 1   GIANA-7 Score 9 7        Risk assessment:  Patient reports no  suicidal ideation  Patient reports no homicidal ideation  Patient reports no self-injurious behavior  Patient reports no violent behavior    Past Medical History:   Diagnosis Date    Allergy     HSV anogenital infection 2022    Recurrent upper respiratory infection (URI)          Current Outpatient Medications:     albuterol (PROVENTIL/VENTOLIN HFA) 90 mcg/actuation inhaler, Inhale 1-2 puffs into the lungs every 6 (six) hours as needed for Wheezing or Shortness of Breath. Rescue, Disp: 6.7 g, Rfl: 0    azelastine (ASTELIN) 137 mcg (0.1 %) nasal spray, 1-2 sprays (137-274 mcg total) by Nasal route 2 (two) times daily as needed for Rhinitis. (Patient not taking: Reported on 12/19/2023), Disp: 30 mL, Rfl: 5    azithromycin (Z-PATRICIA) 250 MG tablet, Take 1 tablet (250 mg total) by mouth once daily. Take first 2 tablets together, then 1 every day until finished. (Patient not taking: Reported on 5/2/2023), Disp: 6 tablet, Rfl: 0    cetirizine (ZYRTEC) 10 MG tablet, Take 10 mg by mouth once daily., Disp: , Rfl:     cetirizine (ZYRTEC) 10 MG tablet, Take 1 tablet (10 mg total) by mouth daily as needed (itching)., Disp: 30 tablet, Rfl: 0    cyclobenzaprine (FLEXERIL) 5 MG tablet, Take 1 tablet (5 mg total) by mouth 3 (three) times daily as needed., Disp: 30 tablet, Rfl: 0    diphenhydrAMINE (BENADRYL) 12.5 mg/5 mL elixir, Take by mouth every 8 (eight) hours., Disp: , Rfl:     docusate sodium (COLACE) 100 MG capsule, Take 1 capsule (100 mg total) by mouth 2 (two) times daily. (Patient not taking: Reported on 12/19/2023), Disp: 60 capsule, Rfl: 11    famotidine (PEPCID) 20 MG tablet, Take 20 mg by mouth., Disp: , Rfl:     fluconazole (DIFLUCAN) 150 MG Tab, TAKE 1 TABLET BY MOUTH ONCE. REFILL AND REDOSE IF SYMPTOMS RECUR FOR 1 DOSE, Disp: , Rfl:     fluticasone propionate (FLONASE) 50 mcg/actuation nasal spray, 1 spray (50 mcg total) by Each Nostril route 2 (two) times daily as needed. (Patient not taking: Reported on 12/19/2023),  "Disp: 15 g, Rfl: 0    ibuprofen (ADVIL,MOTRIN) 600 MG tablet, Take 1 tablet (600 mg total) by mouth every 6 (six) hours as needed for Pain., Disp: 20 tablet, Rfl: 0    mupirocin (BACTROBAN) 2 % ointment, Apply topically 3 (three) times daily. (Patient not taking: Reported on 12/19/2023), Disp: 11.1 g, Rfl: 0    traZODone (DESYREL) 50 MG tablet, TAKE 1 TO 2 TABLETS BY MOUTH AS NEEDED FOR SLEEP. WILL CAUSE DROWSINESS., Disp: 60 tablet, Rfl: 0    valACYclovir (VALTREX) 500 MG tablet, Take 1 tablet (500 mg total) by mouth 2 (two) times daily. for 5 days, Disp: 30 tablet, Rfl: 3    Psychiatric History:  Psychiatric Diagnosis:  {Psychiatric Medications:69152} They {Actions; are/are not:81878} interested in medication changes.  Previous Medication Trials: {YES:40866} {Medication Trials:13523}  Previous Psychiatric Outpatient Treatment:  {YES **/NO:88114}  Previous Psychiatric Hospitalizations:  {YES **/NO:42149}  Previous Suicide Attempts:  {YES **/NO:62425}  History of Trauma:  {YES:23417}  Access to a Firearm:  {YES:58165}    Substance Use History:  Tobacco/Nicotine:  {YES **/No:42473}   Alcohol: {HISTORY; Alcohol Freq:86482}  Illicit Substances: {YES **/NO:25042}  Misuse of Prescription Medications:  {YES **/NO:05831}  Caffeine: {YES **/NO:25991}    Mental Status Exam  General Appearance:  {appearance:05691::"unremarkable","age appropriate"}   Speech: {findings; speech psych:60852::"normal tone","normal rate","normal pitch","normal volume"}      Level of Cooperation: {cooperation:53751}      Thought Processes: {thought process:34101::"normal and logical"}   Mood: {mood:66286}      Thought Content: {thought content:61651::"normal, no suicidality, no homicidality, delusions, or paranoia"}   Affect: {desc; affect:31299::"congruent and appropriate"}   Orientation: {orientation:31671}   Memory: {memory:26546}   Attention Span & Concentration: {attention span:09602}   Fund of General Knowledge: {education:00847}   Abstract " Reasoning: {abstract:21380}   Judgment & Insight: {judgment/insight:74411}     Language  {PSY LANGUAGE:77446}       Impression:   My diagnostic impression is {BHI DIAGNOSIS LIST:35218}, as evidenced by ***.     Provisional Diagnosis:     ICD-10-CM ICD-9-CM   1. Stress at work  Z56.6 V62.1       Treatment Goals and Plan: Initial appointment focused on gathering history, identifying treatment goals and developing a treatment plan.      {[place] TREATMENT GOALS:59020}    Future treatment will utilize {Therapy Types:12532}.      RETURN TO CLINIC: ***
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
## 2024-01-05 PROBLEM — Z56.6 STRESS AT WORK: Status: ACTIVE | Noted: 2024-01-05

## 2024-01-05 PROBLEM — Z63.6 CAREGIVER STRESS: Status: ACTIVE | Noted: 2024-01-05

## 2024-01-05 PROBLEM — F43.21 ADJUSTMENT DISORDER WITH DEPRESSED MOOD: Status: ACTIVE | Noted: 2024-01-05

## 2024-03-19 ENCOUNTER — OFFICE VISIT (OUTPATIENT)
Dept: INTERNAL MEDICINE | Facility: CLINIC | Age: 34
End: 2024-03-19
Payer: MEDICAID

## 2024-03-19 VITALS — WEIGHT: 244.69 LBS | HEART RATE: 85 BPM | OXYGEN SATURATION: 99 % | BODY MASS INDEX: 39.32 KG/M2 | HEIGHT: 66 IN

## 2024-03-19 DIAGNOSIS — L30.9 ECZEMA, UNSPECIFIED TYPE: Primary | ICD-10-CM

## 2024-03-19 DIAGNOSIS — F51.01 PRIMARY INSOMNIA: ICD-10-CM

## 2024-03-19 DIAGNOSIS — E66.9 OBESITY (BMI 35.0-39.9 WITHOUT COMORBIDITY): ICD-10-CM

## 2024-03-19 DIAGNOSIS — Z56.6 STRESS AT WORK: ICD-10-CM

## 2024-03-19 DIAGNOSIS — Z63.6 CAREGIVER STRESS: ICD-10-CM

## 2024-03-19 PROCEDURE — G2211 COMPLEX E/M VISIT ADD ON: HCPCS | Mod: S$PBB,,, | Performed by: STUDENT IN AN ORGANIZED HEALTH CARE EDUCATION/TRAINING PROGRAM

## 2024-03-19 PROCEDURE — 99214 OFFICE O/P EST MOD 30 MIN: CPT | Mod: PBBFAC | Performed by: STUDENT IN AN ORGANIZED HEALTH CARE EDUCATION/TRAINING PROGRAM

## 2024-03-19 PROCEDURE — 99214 OFFICE O/P EST MOD 30 MIN: CPT | Mod: S$PBB,,, | Performed by: STUDENT IN AN ORGANIZED HEALTH CARE EDUCATION/TRAINING PROGRAM

## 2024-03-19 PROCEDURE — 3008F BODY MASS INDEX DOCD: CPT | Mod: CPTII,,, | Performed by: STUDENT IN AN ORGANIZED HEALTH CARE EDUCATION/TRAINING PROGRAM

## 2024-03-19 PROCEDURE — 99999 PR PBB SHADOW E&M-EST. PATIENT-LVL IV: CPT | Mod: PBBFAC,,, | Performed by: STUDENT IN AN ORGANIZED HEALTH CARE EDUCATION/TRAINING PROGRAM

## 2024-03-19 PROCEDURE — 1159F MED LIST DOCD IN RCRD: CPT | Mod: CPTII,,, | Performed by: STUDENT IN AN ORGANIZED HEALTH CARE EDUCATION/TRAINING PROGRAM

## 2024-03-19 RX ORDER — TRAZODONE HYDROCHLORIDE 50 MG/1
TABLET ORAL
Qty: 60 TABLET | Refills: 6 | Status: SHIPPED | OUTPATIENT
Start: 2024-03-19

## 2024-03-19 RX ORDER — TRIAMCINOLONE ACETONIDE 5 MG/G
CREAM TOPICAL 2 TIMES DAILY
Qty: 15 G | Refills: 6 | Status: SHIPPED | OUTPATIENT
Start: 2024-03-19

## 2024-03-19 SDOH — SOCIAL DETERMINANTS OF HEALTH (SDOH): DEPENDENT RELATIVE NEEDING CARE AT HOME: Z63.6

## 2024-03-19 SDOH — SOCIAL DETERMINANTS OF HEALTH (SDOH): OTHER PHYSICAL AND MENTAL STRAIN RELATED TO WORK: Z56.6

## 2024-03-19 NOTE — PROGRESS NOTES
" [facility]    Subjective:       Patient ID: Purvi Ann is a 33 y.o. female.    Chief Complaint: weight    History was obtained from the patient and supplemented through chart review.  This pt is known to me.    HPI:    Patient is a 33 y.o. female w/ obesity presents for weight, sleep and constipation f/u    Lost 9 pounds (maybe more but then regained)  Papaya seeds helping with constipation  Eating overnight oats, more intentional eating, grocery list  Lost more weight, but regained with mohini karina    Works early [place] , doing better at self-care    Pt interested in becoming pregnant, "not in hurry"    Anxiety attacks  Trazodone helps     Severe morbid obesity  Declines meds, concerned about potency of caffeine, did not discuss contrave  Discussed diet and exercise in depth    Exercise: hx of back and forth dieting  Encouraged more healthy habits in general, doing better currently will work to keep it up    Wt Readings from Last 15 Encounters:   03/19/24 111 kg (244 lb 11.4 oz)   12/19/23 114.8 kg (253 lb 1.4 oz)   10/30/23 110.2 kg (242 lb 15.2 oz)   09/28/23 112.2 kg (247 lb 5.7 oz)   08/22/23 108 kg (238 lb)   08/01/23 108.4 kg (238 lb 15.7 oz)   05/02/23 110.3 kg (243 lb 2.7 oz)   12/01/22 101.6 kg (224 lb)   08/10/22 110.6 kg (243 lb 13.3 oz)   07/13/22 112 kg (247 lb)   05/02/22 112.3 kg (247 lb 9.2 oz)   04/05/22 109.8 kg (242 lb 1 oz)   06/15/21 115.2 kg (253 lb 15.5 oz)   06/10/21 112.5 kg (248 lb)   03/30/21 112.2 kg (247 lb 5.7 oz)      Medical History  Past Medical History:   Diagnosis Date    Allergy     HSV anogenital infection 2022    Recurrent upper respiratory infection (URI)        Review of Systems   Constitutional:  Negative for appetite change, diaphoresis, fatigue and fever.   HENT:  Negative for rhinorrhea and trouble swallowing.    Respiratory:  Negative for shortness of breath.    Cardiovascular:  Negative for chest pain. "   Gastrointestinal:  Negative for constipation, diarrhea, nausea and vomiting.   Genitourinary:  Negative for hematuria.   Musculoskeletal:  Negative for gait problem.   Neurological:  Negative for dizziness and seizures.   Psychiatric/Behavioral:  Negative for dysphoric mood and hallucinations.          Surgical hx, family hx, social hx   Dad- with coon cancer age 46  MOM- DM, HTN  ETOH: 3/evening x weekends only, 1 if driving  Sex: men none now  Have been reviewed      Current Outpatient Medications:     albuterol (PROVENTIL/VENTOLIN HFA) 90 mcg/actuation inhaler, Inhale 1-2 puffs into the lungs every 6 (six) hours as needed for Wheezing or Shortness of Breath. Rescue, Disp: 6.7 g, Rfl: 0    cetirizine (ZYRTEC) 10 MG tablet, Take 10 mg by mouth once daily., Disp: , Rfl:     cetirizine (ZYRTEC) 10 MG tablet, Take 1 tablet (10 mg total) by mouth daily as needed (itching)., Disp: 30 tablet, Rfl: 0    cyclobenzaprine (FLEXERIL) 5 MG tablet, Take 1 tablet (5 mg total) by mouth 3 (three) times daily as needed., Disp: 30 tablet, Rfl: 0    diphenhydrAMINE (BENADRYL) 12.5 mg/5 mL elixir, Take by mouth every 8 (eight) hours., Disp: , Rfl:     ibuprofen (ADVIL,MOTRIN) 600 MG tablet, Take 1 tablet (600 mg total) by mouth every 6 (six) hours as needed for Pain., Disp: 20 tablet, Rfl: 0    azelastine (ASTELIN) 137 mcg (0.1 %) nasal spray, 1-2 sprays (137-274 mcg total) by Nasal route 2 (two) times daily as needed for Rhinitis. (Patient not taking: Reported on 12/19/2023), Disp: 30 mL, Rfl: 5    azithromycin (Z-PATRICIA) 250 MG tablet, Take 1 tablet (250 mg total) by mouth once daily. Take first 2 tablets together, then 1 every day until finished. (Patient not taking: Reported on 5/2/2023), Disp: 6 tablet, Rfl: 0    docusate sodium (COLACE) 100 MG capsule, Take 1 capsule (100 mg total) by mouth 2 (two) times daily. (Patient not taking: Reported on 12/19/2023), Disp: 60 capsule, Rfl: 11    famotidine (PEPCID) 20 MG tablet, Take 20 mg  "by mouth., Disp: , Rfl:     fluconazole (DIFLUCAN) 150 MG Tab, TAKE 1 TABLET BY MOUTH ONCE. REFILL AND REDOSE IF SYMPTOMS RECUR FOR 1 DOSE, Disp: , Rfl:     fluticasone propionate (FLONASE) 50 mcg/actuation nasal spray, 1 spray (50 mcg total) by Each Nostril route 2 (two) times daily as needed. (Patient not taking: Reported on 12/19/2023), Disp: 15 g, Rfl: 0    mupirocin (BACTROBAN) 2 % ointment, Apply topically 3 (three) times daily. (Patient not taking: Reported on 12/19/2023), Disp: 11.1 g, Rfl: 0    traZODone (DESYREL) 50 MG tablet, TAKE 1 TO 2 TABLETS BY MOUTH AS NEEDED FOR SLEEP. WILL CAUSE DROWSINESS., Disp: 60 tablet, Rfl: 6    triamcinolone acetonide 0.5% (KENALOG) 0.5 % Crea, Apply topically 2 (two) times daily., Disp: 15 g, Rfl: 6    valACYclovir (VALTREX) 500 MG tablet, Take 1 tablet (500 mg total) by mouth 2 (two) times daily. for 5 days, Disp: 30 tablet, Rfl: 3    Objective:        Body mass index is 39.5 kg/m².  Vitals:    03/19/24 1319   BP: (P) 102/78   Pulse: 85   SpO2: 99%   Weight: 111 kg (244 lb 11.4 oz)   Height: 5' 6" (1.676 m)   PainSc: 0-No pain       Physical Exam  Vitals and nursing note reviewed.   Constitutional:       General: She is not in acute distress.     Appearance: Normal appearance. She is not ill-appearing.   HENT:      Head: Normocephalic and atraumatic.   Eyes:      General: No scleral icterus.  Pulmonary:      Effort: Pulmonary effort is normal.   Musculoskeletal:         General: No deformity.   Neurological:      Mental Status: She is alert and oriented to person, place, and time.   Psychiatric:         Behavior: Behavior normal.           Lab Results   Component Value Date    WBC 9.10 09/28/2023    HGB 11.9 (L) 09/28/2023    HCT 37.7 09/28/2023     09/28/2023    CHOL 163 09/28/2023    TRIG 125 09/28/2023    HDL 39 (L) 09/28/2023    ALT 14 09/28/2023    AST 16 09/28/2023     09/28/2023    K 3.8 09/28/2023     09/28/2023    CREATININE 0.9 09/28/2023    " BUN 10 09/28/2023    CO2 26 09/28/2023    TSH 1.303 09/28/2023    HGBA1C 5.3 09/28/2023       The ASCVD Risk score (Dionisio BOUDREAUX, et al., 2019) failed to calculate for the following reasons:    The 2019 ASCVD risk score is only valid for ages 40 to 79    (Imaging have been independently reviewed)    Reviewd us and abdom xray    Assessment:         1. Eczema, unspecified type    2. Primary insomnia    3. Obesity (BMI 35.0-39.9 without comorbidity)    4. Caregiver stress    5. Stress at work              Plan:     Purvi was seen today for weight.    Diagnoses and all orders for this visit:    Eczema, unspecified type  -     triamcinolone acetonide 0.5% (KENALOG) 0.5 % Crea; Apply topically 2 (two) times daily.    Primary insomnia  -     traZODone (DESYREL) 50 MG tablet; TAKE 1 TO 2 TABLETS BY MOUTH AS NEEDED FOR SLEEP. WILL CAUSE DROWSINESS.    Obesity (BMI 35.0-39.9 without comorbidity)    Caregiver stress    Stress at work            Health Maintenance  - Lipids:   - A1C:   - Colon Ca Screen: N/A due to age  - Immunizations: refused    Women's health  - Pap: [place] NILM and neg HPV 3/30/2021, + BV   - Contraception: trying to get pregnant, counseled on folic acid    Follow up in about 18 weeks (around 7/23/2024) for weight f/u .      Visit today is associated with current or anticipated ongoing medical care related to this patient's single serious condition/complex condition of obesity, weight loss, healthy eating. The patient will return to see me as these issues will be followed longitudinally.      All medications were reviewed including potential side effects and risks/benefits.  Pt was counseled to call back if anything worsens or if questions arise.    Waqas Humphries MD  Family Medicine  [facility]  [address]  [address]  [place] [address]  Phone [phone]  Fax [phone]

## 2024-03-19 NOTE — PATIENT INSTRUCTIONS
- Aerobic: Perform 30-60min/day 5x/wk (>150min/wk) of moderate (110-135bpm) intensity exercise. More vigorous aerobics (>135bpm) can maintain or improve cardiovascular health with at least 75min/wk.  Recommended to continue regular aerobic training routine in order to meet above guidelines.    - Resistance: Perform 2-4 sets of 8-12 repetitions at moderate intensity 2-4x/wk. for each muscle group.  Recommended to continue resistance training program to maintain or increase muscular strength and endurance.     - Flexibility: Perform 2-4 stretches for 30s for each muscle group daily for best results.  Daily stretching should be performed in order to maintain or increase current level of flexibility.

## 2024-04-08 ENCOUNTER — OFFICE VISIT (OUTPATIENT)
Dept: URGENT CARE | Facility: CLINIC | Age: 34
End: 2024-04-08
Payer: MEDICAID

## 2024-04-08 VITALS
SYSTOLIC BLOOD PRESSURE: 132 MMHG | WEIGHT: 244.69 LBS | OXYGEN SATURATION: 98 % | RESPIRATION RATE: 18 BRPM | DIASTOLIC BLOOD PRESSURE: 88 MMHG | BODY MASS INDEX: 39.32 KG/M2 | TEMPERATURE: 98 F | HEART RATE: 85 BPM | HEIGHT: 66 IN

## 2024-04-08 DIAGNOSIS — L73.9 FOLLICULITIS: Primary | ICD-10-CM

## 2024-04-08 PROCEDURE — 99213 OFFICE O/P EST LOW 20 MIN: CPT | Mod: S$GLB,,,

## 2024-04-08 RX ORDER — MUPIROCIN 20 MG/G
OINTMENT TOPICAL 3 TIMES DAILY
Qty: 15 G | Refills: 0 | Status: SHIPPED | OUTPATIENT
Start: 2024-04-08 | End: 2024-04-15

## 2024-04-08 NOTE — PATIENT INSTRUCTIONS
Rash: Apply mupirocin 2-3 times daily for 7 days  Itching: Continue taking Zyrtec and add Benadryl if needed  Rash should resolve in 7-10 days  Monitor for increased redness, fever, chills, or worsening of symptoms  Please return here or go to the Emergency Department for any concerns or worsening of condition.  If you were prescribed antibiotics, please take them to completion.  If you were prescribed a narcotic medication, do not drive or operate heavy equipment or machinery while taking these medications.  Please follow up with your primary care doctor or specialist as needed.    If you  smoke, please stop smoking.

## 2024-04-08 NOTE — PROGRESS NOTES
"Subjective:      Patient ID: Purvi Ann is a 33 y.o. female.    Vitals:  height is 5' 6" (1.676 m) and weight is 111 kg (244 lb 11.4 oz). Her oral temperature is 98 °F (36.7 °C). Her blood pressure is 132/88 and her pulse is 85. Her respiration is 18 and oxygen saturation is 98%.     Chief Complaint: Rash    Pt is a 34 yo female presenting with rash on L buttock.  Onset of rash was 2 days ago, but pruritus began yesterday. Denies any fever, chills, myalgia, discharge. Denies rash in any other location. She returned from vacation 2 days ago. On vacation she did use a hot tub twice. Pt reports using no OTC treatment.    Rash  This is a new problem. The current episode started in the past 7 days. The problem has been gradually worsening since onset. The rash is characterized by redness, itchiness, blistering and swelling. She was exposed to nothing. Pertinent negatives include no congestion, cough, diarrhea, fever, shortness of breath, sore throat or vomiting. Past treatments include nothing. The treatment provided no relief.     Constitution: Negative for activity change, appetite change, chills and fever.   HENT:  Negative for ear pain, congestion, postnasal drip, sinus pain, sinus pressure and sore throat.    Neck: Negative for neck pain.   Cardiovascular:  Negative for chest pain and sob on exertion.   Eyes:  Negative for eye trauma, eye discharge, eye itching, eye redness, photophobia and blurred vision.   Respiratory:  Negative for cough, shortness of breath, wheezing and asthma.    Gastrointestinal:  Negative for abdominal pain, nausea, vomiting, constipation and diarrhea.   Genitourinary:  Negative for dysuria, frequency, urgency, urine decreased and hematuria.   Musculoskeletal:  Negative for pain and muscle ache.   Skin:  Positive for rash. Negative for color change and hives.   Allergic/Immunologic: Negative for seasonal allergies, asthma, hives and sneezing.   Neurological:  Negative for " dizziness, light-headedness, headaches and altered mental status.   Psychiatric/Behavioral:  Negative for altered mental status and confusion.       Objective:     Physical Exam   Constitutional: She is oriented to person, place, and time. She appears well-developed. She is cooperative.  Non-toxic appearance. She does not appear ill. No distress.      Comments:Pt sitting erect on examination table. No acute respiratory distress, no use of accessory muscles, no notice of nasal flaring.        HENT:   Head: Normocephalic and atraumatic.   Ears:   Right Ear: Hearing, tympanic membrane, external ear and ear canal normal.   Left Ear: Hearing, tympanic membrane, external ear and ear canal normal.   Nose: Nose normal. No mucosal edema, rhinorrhea, nasal deformity or congestion. No epistaxis. Right sinus exhibits no maxillary sinus tenderness and no frontal sinus tenderness. Left sinus exhibits no maxillary sinus tenderness and no frontal sinus tenderness.   Mouth/Throat: Uvula is midline, oropharynx is clear and moist and mucous membranes are normal. Mucous membranes are moist. No trismus in the jaw. Normal dentition. No uvula swelling. No oropharyngeal exudate, posterior oropharyngeal edema or posterior oropharyngeal erythema. Oropharynx is clear.   Eyes: Conjunctivae and lids are normal. Pupils are equal, round, and reactive to light. No scleral icterus. Extraocular movement intact   Neck: Trachea normal and phonation normal. Neck supple. No edema present. No erythema present. No neck rigidity present.   Cardiovascular: Normal rate, regular rhythm, normal heart sounds and normal pulses.   Pulmonary/Chest: Effort normal and breath sounds normal. No accessory muscle usage. No apnea, no tachypnea and no bradypnea. No respiratory distress. She has no decreased breath sounds. She has no rhonchi.   Abdominal: Normal appearance.   Musculoskeletal: Normal range of motion.         General: No deformity. Normal range of motion.    Neurological: She is alert and oriented to person, place, and time. She exhibits normal muscle tone. Coordination normal.   Skin: Skin is warm, dry, intact, not diaphoretic, not pale, rash and papular (multiple erythematous, papular lesions <1cm, nontender, no discharge).        Psychiatric: Her speech is normal and behavior is normal. Judgment and thought content normal.   Nursing note and vitals reviewed.      Assessment:     1. Folliculitis        Plan:   I have reviewed the patient chart and pertinent past imaging/labs.  Likely pseudomonas folliculitis due to hot tub use. Pt educated that this is typically self-limiting due to mild symptoms. Prescribed anti-bacterial ointment and encouraged to continue use of anti-histamine PO and cream.     Folliculitis  -     mupirocin (BACTROBAN) 2 % ointment; Apply topically 3 (three) times daily. for 7 days  Dispense: 15 g; Refill: 0

## 2024-05-06 ENCOUNTER — TELEPHONE (OUTPATIENT)
Dept: OBSTETRICS AND GYNECOLOGY | Facility: CLINIC | Age: 34
End: 2024-05-06
Payer: MEDICAID

## 2024-05-06 ENCOUNTER — OFFICE VISIT (OUTPATIENT)
Dept: URGENT CARE | Facility: CLINIC | Age: 34
End: 2024-05-06
Payer: MEDICAID

## 2024-05-06 VITALS
HEIGHT: 66 IN | DIASTOLIC BLOOD PRESSURE: 70 MMHG | WEIGHT: 244 LBS | RESPIRATION RATE: 20 BRPM | BODY MASS INDEX: 39.21 KG/M2 | TEMPERATURE: 98 F | OXYGEN SATURATION: 98 % | HEART RATE: 78 BPM | SYSTOLIC BLOOD PRESSURE: 120 MMHG

## 2024-05-06 DIAGNOSIS — N63.0 NODULE OF SKIN OF BREAST: Primary | ICD-10-CM

## 2024-05-06 PROCEDURE — 99213 OFFICE O/P EST LOW 20 MIN: CPT | Mod: S$GLB,,, | Performed by: NURSE PRACTITIONER

## 2024-05-06 NOTE — TELEPHONE ENCOUNTER
----- Message from Aydin Mata sent at 5/6/2024 12:22 PM CDT -----  Type:  Sooner Apoointment Request    Caller is requesting a sooner appointment.  Caller declined first available appointment listed below.  Caller will not accept being placed on the waitlist and is requesting a message be sent to doctor.  Name of Caller:pt  When is the first available appointment?6/19  Symptoms:mass under (L) breast  Would the patient rather a call back or a response via [facility]? call  Best Call Back Number:[phone]  Additional Information: Pt states she went to an urgent care today but would like to be seen by provider asap. Thank you

## 2024-05-06 NOTE — PROGRESS NOTES
"Subjective:      Patient ID: Purvi Ann is a 33 y.o. female.    Vitals:  height is 5' 6" (1.676 m) and weight is 110.7 kg (244 lb). Her oral temperature is 98 °F (36.7 °C). Her blood pressure is 120/70 and her pulse is 78. Her respiration is 20 and oxygen saturation is 98%.     Chief Complaint: Breast pain and Call in car    Pt states her left breast on sat was tender and painful, she felt a lump in her breast, her period is due in a few days, she has a gyn   Provider note below:  This is a 33 y.o. female who presents today with a chief complaint of left breast "lump" noted on Saturday, patient reports it was only painful on Saturday then it resolved, and now she only feels a lump in her left breast, patient reports she does have OBGYN had the mammogram done last year that was normal, denies fever, body aches or chills, denies cough, wheezing or shortness of breath, denies nausea, vomiting, diarrhea or abdominal pain, denies chest pain or dizziness positional lightheadedness, denies sore throat or trouble swallowing, denies loss of taste or smell, or any other symptoms       Pain  This is a new problem. The current episode started in the past 7 days. The problem has been gradually worsening.       Skin:  Negative for erythema.      Objective:     Physical Exam   Constitutional: She is oriented to person, place, and time. She appears well-developed.   HENT:   Head: Normocephalic and atraumatic. Head is without abrasion, without contusion and without laceration.   Ears:   Right Ear: External ear normal.   Left Ear: External ear normal.   Nose: Nose normal.   Mouth/Throat: Oropharynx is clear and moist and mucous membranes are normal.   Eyes: Conjunctivae, EOM and lids are normal. Pupils are equal, round, and reactive to light.   Neck: Trachea normal and phonation normal. Neck supple.   Cardiovascular: Normal rate, regular rhythm and normal heart sounds.   Pulmonary/Chest: Effort normal and breath " sounds normal. No stridor. No respiratory distress.   Pea sized non tender mass/nodule noted to left breast, no erythema or swelling noted, no surrounding erythema noted, no fluctuance or induration noted  Luiza in room for exam             Comments: Pea sized non tender mass/nodule noted to left breast, no erythema or swelling noted, no surrounding erythema noted, no fluctuance or induration noted  Luiza in room for exam    Musculoskeletal: Normal range of motion.         General: Normal range of motion.   Neurological: She is alert and oriented to person, place, and time.   Skin: Skin is warm, dry, intact and no rash. Capillary refill takes less than 2 seconds. No abrasion, No burn, No bruising, No erythema and No ecchymosis   Psychiatric: Her speech is normal and behavior is normal. Judgment and thought content normal.   Nursing note and vitals reviewed.        Patient in no acute distress.  Vitals reassuring.  Discussed results/diagnosis/plan in depth with patient in clinic. Strict precautions given to patient to monitor for worsening signs and symptoms. Advised to follow up with primary.All questions answered. Strict ER precautions given. If your symptoms worsens or fail to improve you should go to the Emergency Room. Discharge and follow-up instructions given verbally/printed. Discharge and follow-up instructions discussed with the patient who expressed understanding and willingness to comply with my recommendations.Patient voiced understanding and in agreement with current treatment plan.     Please be advised this text was dictated with [employer] software and may contain errors due to translation.   Assessment:     1. Nodule of skin of breast        Plan:       Nodule of skin of breast          Medical Decision Making:   Urgent Care Management:  Patient in no acute distress.  Vitals reassuring.  On exam, patient is nontoxic appearing and afebrile.  Lungs CTA.  Physical examination as above.  No concern for  cellulitis or skin infection.  Advised patient to follow-up with OBGYN for further evaluation with imaging.   over-the-counter medication discussed with patient at length.  Proper hydration advised.  I reiterated the importance of further evaluation if no improvement symptoms and follow-up with primary. Patient voiced understanding and in agreement with current treatment plan.             Patient Instructions   If your condition worsens or fails to improve we recommend that you receive another evaluation at the ER immediately or contact your PCP to discuss your concerns or return here. You must understand that you've received an urgent care treatment only and that you may be released before all your medical problems are known or treated. You the patient will arrange for followup care as instructed.    If you were prescribed antibiotics, please take them to completion.  If you were prescribed a narcotic medication, do not drive or operate heavy equipment or machinery while taking these medications.  Please follow up with your primary care doctor or specialist as needed.  If you  smoke, please stop smoking.

## 2024-05-06 NOTE — TELEPHONE ENCOUNTER
Spoke with pt. Offered pt multiple appt with different providers and locations. Pt declined all due to work schedule. Pt accept appointment with Tahira rose on 6/3 @330pm. Pt verbalize understanding.

## 2024-06-03 ENCOUNTER — OFFICE VISIT (OUTPATIENT)
Dept: OBSTETRICS AND GYNECOLOGY | Facility: CLINIC | Age: 34
End: 2024-06-03
Payer: MEDICAID

## 2024-06-03 VITALS
BODY MASS INDEX: 39.39 KG/M2 | DIASTOLIC BLOOD PRESSURE: 74 MMHG | WEIGHT: 244.06 LBS | SYSTOLIC BLOOD PRESSURE: 110 MMHG

## 2024-06-03 DIAGNOSIS — N89.8 VAGINAL ODOR: Primary | ICD-10-CM

## 2024-06-03 DIAGNOSIS — N63.23 MASS OF LOWER OUTER QUADRANT OF LEFT BREAST: ICD-10-CM

## 2024-06-03 PROCEDURE — 3008F BODY MASS INDEX DOCD: CPT | Mod: CPTII,,, | Performed by: ADVANCED PRACTICE MIDWIFE

## 2024-06-03 PROCEDURE — 1159F MED LIST DOCD IN RCRD: CPT | Mod: CPTII,,, | Performed by: ADVANCED PRACTICE MIDWIFE

## 2024-06-03 PROCEDURE — 3078F DIAST BP <80 MM HG: CPT | Mod: CPTII,,, | Performed by: ADVANCED PRACTICE MIDWIFE

## 2024-06-03 PROCEDURE — 99213 OFFICE O/P EST LOW 20 MIN: CPT | Mod: S$PBB,,, | Performed by: ADVANCED PRACTICE MIDWIFE

## 2024-06-03 PROCEDURE — 99999 PR PBB SHADOW E&M-EST. PATIENT-LVL IV: CPT | Mod: PBBFAC,,, | Performed by: ADVANCED PRACTICE MIDWIFE

## 2024-06-03 PROCEDURE — 81514 NFCT DS BV&VAGINITIS DNA ALG: CPT | Performed by: ADVANCED PRACTICE MIDWIFE

## 2024-06-03 PROCEDURE — 3074F SYST BP LT 130 MM HG: CPT | Mod: CPTII,,, | Performed by: ADVANCED PRACTICE MIDWIFE

## 2024-06-03 PROCEDURE — 99214 OFFICE O/P EST MOD 30 MIN: CPT | Mod: PBBFAC,PN | Performed by: ADVANCED PRACTICE MIDWIFE

## 2024-06-03 RX ORDER — IBUPROFEN 800 MG/1
800 TABLET ORAL EVERY 6 HOURS PRN
COMMUNITY
Start: 2024-04-01

## 2024-06-03 NOTE — PROGRESS NOTES
Purvi Ann is a 33 y.o. female  presents to GYN Clinic with complaint of vaginal odor. Pt also desires follow up from earlier visit on 24 for a breast mass on L breast. Pt reports the mass was painful but no longer hurts.Was advised to follow up with gyn.    ROS:  GENERAL: No fever, chills, fatigability or weight loss.  VULVAR: No pain, no lesions and no itching.  VAGINAL: No relaxation, no abnormal bleeding and no lesions.Reports discharge and odor  ABDOMEN: No abdominal pain. Denies nausea. Denies vomiting. No diarrhea. No constipation  URINARY: No incontinence, no nocturia, no frequency and no dysuria.    Review of patient's allergies indicates:   Allergen Reactions    Ceftriaxone Edema     Tolerates penicillin with no issues    Percocet [oxycodone-acetaminophen]        Current Outpatient Medications:     diphenhydrAMINE (BENADRYL) 12.5 mg/5 mL elixir, Take by mouth every 8 (eight) hours., Disp: , Rfl:     ibuprofen (ADVIL,MOTRIN) 800 MG tablet, Take 800 mg by mouth every 6 (six) hours as needed., Disp: , Rfl:     albuterol (PROVENTIL/VENTOLIN HFA) 90 mcg/actuation inhaler, Inhale 1-2 puffs into the lungs every 6 (six) hours as needed for Wheezing or Shortness of Breath. Rescue (Patient not taking: Reported on 2024), Disp: 6.7 g, Rfl: 0    azelastine (ASTELIN) 137 mcg (0.1 %) nasal spray, 1-2 sprays (137-274 mcg total) by Nasal route 2 (two) times daily as needed for Rhinitis. (Patient not taking: Reported on 2023), Disp: 30 mL, Rfl: 5    azithromycin (Z-PATRICIA) 250 MG tablet, Take 1 tablet (250 mg total) by mouth once daily. Take first 2 tablets together, then 1 every day until finished. (Patient not taking: Reported on 2023), Disp: 6 tablet, Rfl: 0    cetirizine (ZYRTEC) 10 MG tablet, Take 10 mg by mouth once daily. (Patient not taking: Reported on 2024), Disp: , Rfl:     cetirizine (ZYRTEC) 10 MG tablet, Take 1 tablet (10 mg total) by mouth daily as needed  (itching). (Patient not taking: Reported on 5/6/2024), Disp: 30 tablet, Rfl: 0    cyclobenzaprine (FLEXERIL) 5 MG tablet, Take 1 tablet (5 mg total) by mouth 3 (three) times daily as needed. (Patient not taking: Reported on 5/6/2024), Disp: 30 tablet, Rfl: 0    docusate sodium (COLACE) 100 MG capsule, Take 1 capsule (100 mg total) by mouth 2 (two) times daily. (Patient not taking: Reported on 12/19/2023), Disp: 60 capsule, Rfl: 11    famotidine (PEPCID) 20 MG tablet, Take 20 mg by mouth. (Patient not taking: Reported on 5/6/2024), Disp: , Rfl:     fluconazole (DIFLUCAN) 150 MG Tab, TAKE 1 TABLET BY MOUTH ONCE. REFILL AND REDOSE IF SYMPTOMS RECUR FOR 1 DOSE (Patient not taking: Reported on 5/6/2024), Disp: , Rfl:     fluticasone propionate (FLONASE) 50 mcg/actuation nasal spray, 1 spray (50 mcg total) by Each Nostril route 2 (two) times daily as needed. (Patient not taking: Reported on 12/19/2023), Disp: 15 g, Rfl: 0    ibuprofen (ADVIL,MOTRIN) 600 MG tablet, Take 1 tablet (600 mg total) by mouth every 6 (six) hours as needed for Pain. (Patient not taking: Reported on 5/6/2024), Disp: 20 tablet, Rfl: 0    mupirocin (BACTROBAN) 2 % ointment, Apply topically 3 (three) times daily. (Patient not taking: Reported on 5/6/2024), Disp: 11.1 g, Rfl: 0    traZODone (DESYREL) 50 MG tablet, TAKE 1 TO 2 TABLETS BY MOUTH AS NEEDED FOR SLEEP. WILL CAUSE DROWSINESS. (Patient not taking: Reported on 5/6/2024), Disp: 60 tablet, Rfl: 6    triamcinolone acetonide 0.5% (KENALOG) 0.5 % Crea, Apply topically 2 (two) times daily. (Patient not taking: Reported on 5/6/2024), Disp: 15 g, Rfl: 6    valACYclovir (VALTREX) 500 MG tablet, Take 1 tablet (500 mg total) by mouth 2 (two) times daily. for 5 days, Disp: 30 tablet, Rfl: 3    Past Medical History:   Diagnosis Date    Allergy     HSV anogenital infection 2022    Recurrent upper respiratory infection (URI)      Past Surgical History:   Procedure Laterality Date    NO PAST SURGERIES        Social History     Tobacco Use    Smoking status: Never    Smokeless tobacco: Never   Substance Use Topics    Alcohol use: Yes     Comment: social    Drug use: No     OB History    Para Term  AB Living   0 0 0 0 0 0   SAB IAB Ectopic Multiple Live Births   0 0 0 0         /74   Wt 110.7 kg (244 lb 0.8 oz)   LMP 2024   BMI 39.39 kg/m²     PHYSICAL EXAM:  GENERAL: Calm and appropriate affect, alert, oriented x4  SKIN: Color appropriate for race, warm and dry, clean and intact with no rashes.  RESP: Even, unlabored breathing  ABDOMEN: Soft, nontender, no masses.  L BREAST: 1mm mass noted in lower L quadrant of L breast- feel this is skin related as it does not penetrate into breast tissue.   :   Normal external female genitalia without lesions. Normal hair distribution. Adequate perineal body, normal urethral meatus.  Vagina pink and well rugated, no lesions, vaginal discharge - minimal, no overt odor  No significant cystocele or rectocele.  Cervix -no cervical motion tenderness.      ASSESSMENT / PLAN:    ICD-10-CM ICD-9-CM    1. Vaginal odor  N89.8 625.8 Vaginosis Screen by DNA Probe      2. Mass of lower outer quadrant of left breast  N63.23 611.72 Mammo Digital Diagnostic Left with Bridger      US Breast Bilateral Limited        Vaginal odor  -     Vaginosis Screen by DNA Probe    Mass of lower outer quadrant of left breast  -     Mammo Digital Diagnostic Left with Bridger; Future; Expected date: 2024  -     US Breast Bilateral Limited; Future; Expected date: 2024    Advised pt that feel the area of concern on her breast is a skin issue rather than a breast mass, discussed etiology and appearance of folliculitis. Advised will obtain mammogram and limited breast ultrasound as pt with concerns sec to hx of breast mass in .     Patient was counseled today on vaginitis prevention including :  a. avoiding feminine products such as deoderant soaps, body wash, bubble bath,  douches, scented toilet paper, deoderant tampons or pads, feminine wipes, chronic pad use, etc.  b. avoiding other vulvovaginal irritants such as long hot baths, humidity, tight, synthetic clothing, chlorine and sitting around in wet bathing suits  c. wearing cotton underwear, avoiding thong underwear and no underwear to bed  d. taking showers instead of baths and use a hair dryer on cool setting afterwards to dry  e. wearing cotton to exercise and shower immediately after exercise and change clothes  f. using polyurethane condoms without spermicide if sexually active and symptoms are triggered by intercourse  g. Discussed use of vagisil, along with repHresh and probiotics    FOLLOW UP:   Pending lab results, PRN lack of improvement.

## 2024-06-06 ENCOUNTER — TELEPHONE (OUTPATIENT)
Dept: OBSTETRICS AND GYNECOLOGY | Facility: CLINIC | Age: 34
End: 2024-06-06
Payer: MEDICAID

## 2024-06-06 NOTE — TELEPHONE ENCOUNTER
Informed pt upon providers arrival back to clinic, provider will review all labs and send necessary treatments if needed. Pt verbalized understanding.

## 2024-06-06 NOTE — TELEPHONE ENCOUNTER
----- Message from Armin Graf sent at 6/6/2024  3:48 PM CDT -----      Name of Who is Calling: AJ CHAPPELL [[id number]]      What is the request in detail: Pt called to check the status of medication being sent to the pharmacy.Please contact to further discuss and advise.          Can the clinic reply by [facility]: Y      What Number to Call Back if not in [facility]: [phone]

## 2024-06-07 ENCOUNTER — PATIENT MESSAGE (OUTPATIENT)
Dept: OBSTETRICS AND GYNECOLOGY | Facility: CLINIC | Age: 34
End: 2024-06-07
Payer: MEDICAID

## 2024-06-07 ENCOUNTER — TELEPHONE (OUTPATIENT)
Dept: OBSTETRICS AND GYNECOLOGY | Facility: CLINIC | Age: 34
End: 2024-06-07
Payer: MEDICAID

## 2024-06-07 DIAGNOSIS — B96.89 BV (BACTERIAL VAGINOSIS): Primary | ICD-10-CM

## 2024-06-07 DIAGNOSIS — N76.0 BV (BACTERIAL VAGINOSIS): Primary | ICD-10-CM

## 2024-06-07 RX ORDER — METRONIDAZOLE 500 MG/1
500 TABLET ORAL 2 TIMES DAILY
Qty: 14 TABLET | Refills: 0 | Status: SHIPPED | OUTPATIENT
Start: 2024-06-07 | End: 2024-06-14

## 2024-06-07 NOTE — TELEPHONE ENCOUNTER
----- Message from Armin Graf sent at 6/7/2024  1:30 PM CDT -----      Name of Who is Calling: AJ CHAPPELL [[id number]]      What is the request in detail: Pt called to check status of checking labs to get medication.Please contact to further discuss and advise.          Can the clinic reply by [facility]: Y      [facility]: [phone]

## 2024-06-07 NOTE — TELEPHONE ENCOUNTER
Spoke with pt, informed pt that provider reviewed lab results and sent portal message informing of results and that medication was sent in to pharmacy on file. Pt verbalized understanding

## 2024-06-24 ENCOUNTER — CLINICAL SUPPORT (OUTPATIENT)
Dept: OBSTETRICS AND GYNECOLOGY | Facility: CLINIC | Age: 34
End: 2024-06-24
Payer: MEDICAID

## 2024-06-24 ENCOUNTER — PATIENT MESSAGE (OUTPATIENT)
Dept: OBSTETRICS AND GYNECOLOGY | Facility: CLINIC | Age: 34
End: 2024-06-24

## 2024-06-24 DIAGNOSIS — N91.2 AMENORRHEA: Primary | ICD-10-CM

## 2024-06-24 PROCEDURE — 99213 OFFICE O/P EST LOW 20 MIN: CPT | Mod: PBBFAC

## 2024-06-24 PROCEDURE — 99999 PR PBB SHADOW E&M-EST. PATIENT-LVL III: CPT | Mod: PBBFAC,,,

## 2024-06-24 RX ORDER — GUAIFENESIN 600 MG/1
1200 TABLET, EXTENDED RELEASE ORAL DAILY
COMMUNITY

## 2024-06-24 NOTE — PROGRESS NOTES
Spoke with patient for a total of 30 minutes during virtual visit.  Updated chart to reflect up to date patient demographics.  Allergies, medications, pharmacy, medical/family history and OB history updated.  Patient was guided through expectations of care during pregnancy.  Pregnancy confirmation, dating u/s & first routine OB appts scheduled.  Education provided & questions answered. Encouraged to send message or call office with any questions/concerns. Verbalized understanding.     Discussed with pt:    taking PNV   C/o occ nausea/no vomiting  denies cramping/spotting  Precautions discussed  Referred to [email or web address] for Preg A to Z guide & class schedule   Discussed benefits of breastfeeding  Discussed need for pediatrician  New pt for Dr Han-has seen Rocío

## 2024-07-09 ENCOUNTER — PATIENT OUTREACH (OUTPATIENT)
Dept: ADMINISTRATIVE | Facility: HOSPITAL | Age: 34
End: 2024-07-09
Payer: MEDICAID

## 2024-07-10 ENCOUNTER — HOSPITAL ENCOUNTER (OUTPATIENT)
Dept: PERINATAL CARE | Facility: OTHER | Age: 34
Discharge: HOME | End: 2024-07-10
Attending: FAMILY MEDICINE
Payer: MEDICAID

## 2024-07-10 ENCOUNTER — OFFICE VISIT (OUTPATIENT)
Dept: OBSTETRICS AND GYNECOLOGY | Facility: CLINIC | Age: 34
End: 2024-07-10
Payer: MEDICAID

## 2024-07-10 ENCOUNTER — PATIENT MESSAGE (OUTPATIENT)
Dept: OBSTETRICS AND GYNECOLOGY | Facility: CLINIC | Age: 34
End: 2024-07-10

## 2024-07-10 VITALS
DIASTOLIC BLOOD PRESSURE: 73 MMHG | HEIGHT: 66 IN | BODY MASS INDEX: 39.97 KG/M2 | WEIGHT: 248.69 LBS | SYSTOLIC BLOOD PRESSURE: 99 MMHG

## 2024-07-10 DIAGNOSIS — Z12.4 PAP SMEAR FOR CERVICAL CANCER SCREENING: ICD-10-CM

## 2024-07-10 DIAGNOSIS — N91.4 SECONDARY OLIGOMENORRHEA: ICD-10-CM

## 2024-07-10 DIAGNOSIS — N91.2 AMENORRHEA: ICD-10-CM

## 2024-07-10 DIAGNOSIS — Z32.01 POSITIVE PREGNANCY TEST: Primary | ICD-10-CM

## 2024-07-10 PROCEDURE — 87491 CHLMYD TRACH DNA AMP PROBE: CPT | Performed by: FAMILY MEDICINE

## 2024-07-10 PROCEDURE — 76801 OB US < 14 WKS SINGLE FETUS: CPT | Mod: 26,,, | Performed by: OBSTETRICS & GYNECOLOGY

## 2024-07-10 PROCEDURE — 1160F RVW MEDS BY RX/DR IN RCRD: CPT | Mod: CPTII,,, | Performed by: FAMILY MEDICINE

## 2024-07-10 PROCEDURE — 99213 OFFICE O/P EST LOW 20 MIN: CPT | Mod: S$PBB,,, | Performed by: FAMILY MEDICINE

## 2024-07-10 PROCEDURE — 1159F MED LIST DOCD IN RCRD: CPT | Mod: CPTII,,, | Performed by: FAMILY MEDICINE

## 2024-07-10 PROCEDURE — 99999 PR PBB SHADOW E&M-EST. PATIENT-LVL III: CPT | Mod: PBBFAC,,, | Performed by: FAMILY MEDICINE

## 2024-07-10 PROCEDURE — 99213 OFFICE O/P EST LOW 20 MIN: CPT | Mod: PBBFAC,25,TH | Performed by: FAMILY MEDICINE

## 2024-07-10 PROCEDURE — 76801 OB US < 14 WKS SINGLE FETUS: CPT

## 2024-07-10 PROCEDURE — 3078F DIAST BP <80 MM HG: CPT | Mod: CPTII,,, | Performed by: FAMILY MEDICINE

## 2024-07-10 PROCEDURE — 3074F SYST BP LT 130 MM HG: CPT | Mod: CPTII,,, | Performed by: FAMILY MEDICINE

## 2024-07-10 PROCEDURE — 3008F BODY MASS INDEX DOCD: CPT | Mod: CPTII,,, | Performed by: FAMILY MEDICINE

## 2024-07-10 PROCEDURE — 87086 URINE CULTURE/COLONY COUNT: CPT | Performed by: FAMILY MEDICINE

## 2024-07-10 NOTE — PROGRESS NOTES
HISTORY OF PRESENT ILLNESS:    Purvi Ann is a 33 y.o. female, ,  Patient's last menstrual period was 2024 (exact date).  for a routine exam complaining of amenorrhea and positive home UPT. First pregnancy. Pt and partner healthy. He has healthy children. [place] Mild nausea, no vb or pelvic pain. No abn pap, hx of genital HSV. This is the extent of the patient's complaints at this time.     Past Medical History:   Diagnosis Date    Allergy     HSV anogenital infection     Recurrent upper respiratory infection (URI)        Past Surgical History:   Procedure Laterality Date    NO PAST SURGERIES         MEDICATIONS AND ALLERGIES:      Current Outpatient Medications:     famotidine (PEPCID) 20 MG tablet, Take 20 mg by mouth., Disp: , Rfl:     guaiFENesin (MUCINEX) 600 mg 12 hr tablet, Take 1,200 mg by mouth Daily., Disp: , Rfl:     prenatal vit no.124/iron/folic (PRENATAL VITAMIN ORAL), Take 1 Dose by mouth Daily., Disp: , Rfl:     valACYclovir (VALTREX) 500 MG tablet, Take 1 tablet (500 mg total) by mouth 2 (two) times daily. for 5 days, Disp: 30 tablet, Rfl: 3    Review of patient's allergies indicates:   Allergen Reactions    Ceftriaxone Edema     Tolerates penicillin with no issues    Percocet [oxycodone-acetaminophen]        Family History   Problem Relation Name Age of Onset    Colon cancer Father      Cancer Father  46        colon ca    Diabetes Mother      Hypertension Mother      Breast cancer Neg Hx      Eclampsia Neg Hx      Miscarriages / Stillbirths Neg Hx      Ovarian cancer Neg Hx       labor Neg Hx      Stroke Neg Hx      Cervical cancer Neg Hx      Vaginal cancer Neg Hx      Endometrial cancer Neg Hx         Social History     Socioeconomic History    Marital status: Significant Other   Tobacco Use    Smoking status: Never    Smokeless tobacco: Never   Substance and Sexual Activity    Alcohol use: Not Currently     Comment: social    Drug  use: No    Sexual activity: Yes     Partners: Male     Birth control/protection: None     Social Determinants of Health     Financial Resource Strain: High Risk (3/19/2024)    Overall Financial Resource Strain (CARDIA)     Difficulty of Paying Living Expenses: Very hard   Food Insecurity: Food Insecurity Present (3/19/2024)    Hunger Vital Sign     Worried About Running Out of Food in the Last Year: Sometimes true     Ran Out of Food in the Last Year: Sometimes true   Transportation Needs: No Transportation Needs (3/19/2024)    PRAPARE - Transportation     Lack of Transportation (Medical): No     Lack of Transportation (Non-Medical): No   Physical Activity: Unknown (3/19/2024)    Exercise Vital Sign     Days of Exercise per Week: 5 days   Stress: No Stress Concern Present (3/19/2024)    [employer] [place] [employer] - Occupational Stress Questionnaire     Feeling of Stress : Only a little   Housing Stability: High Risk (3/19/2024)    Housing Stability Vital Sign     Unable to Pay for Housing in the Last Year: Yes     Unstable Housing in the Last Year: No       COMPREHENSIVE GYN HISTORY:  PAP History: Denies abnormal Paps.  Infection History: + STDs. Denies PID.  Benign History: Denies uterine fibroids. Denies ovarian cysts. Denies endometriosis. Denies other conditions.  Cancer History: Denies cervical cancer. Denies uterine cancer or hyperplasia. Denies ovarian cancer. Denies vulvar cancer or pre-cancer. Denies vaginal cancer or pre-cancer. Denies breast cancer. Denies colon cancer.  Sexual Activity History: Reports currently being sexually active  Menstrual History: None.  Contraception: None    ROS:  GENERAL: No weight changes. No swelling. No fatigue. No fever.  CARDIOVASCULAR: No chest pain. No shortness of breath. No leg cramps.   NEUROLOGICAL: No headaches. No vision changes.  BREASTS: + pain. No lumps. No discharge.  ABDOMEN: No pain. + nausea. No vomiting. No diarrhea. No  "constipation.  REPRODUCTIVE: No abnormal bleeding. No pelvic pain  VULVA: No pain. No lesions. No itching.  VAGINA: No relaxation. No itching. No odor. No discharge. No lesions.  URINARY: No incontinence. No nocturia. No frequency. No dysuria.    BP 99/73 (BP Location: Right arm, Patient Position: Sitting)   Ht 5' 6" (1.676 m)   Wt 112.8 kg (248 lb 10.9 oz)   LMP 2024 (Exact Date)   BMI 40.14 kg/m²     PE:  Physical Exam:   Constitutional: She appears well-developed and well-nourished. She does not appear ill. No distress.        Pulmonary/Chest: Right breast exhibits no inverted nipple, no mass, no nipple discharge, no skin change, no tenderness and no swelling. Left breast exhibits no inverted nipple, no mass, no nipple discharge, no skin change, no tenderness and no swelling.          Genitourinary:    Urethra, vagina, uterus, right adnexa and left adnexa normal.      Pelvic exam was performed with patient in the lithotomy position.   The external female genitalia was normal.   Genitalia hair distrobution normal .   There is no rash or lesion on the right labia. There is no rash or lesion on the left labia. Cervix is normal. No rectocele, cystocele or prolapse of vaginal walls in the vagina.    pap smear completedUterus is not tender. Normal urethral meatus.              Neurological: GCS eye subscore is 4. GCS verbal subscore is 5. GCS motor subscore is 6.         PROCEDURES:  UPT Positive  Genprobe  Pap      DIAGNOSIS:  Gyn exam  IUP with stated LMP of Patient's last menstrual period was 2024 (exact date).    Indication   ========   Indication: Estimation of Gestational Age   Indication: Obesity (Obstetric)     History   ======   Previous Outcomes    1   Risk Factors   History risk factors: Obesity BMI >35   Details: Prepregnancy 39     Method   ======   2D Color Doppler, [place] S8, Transabdominal and transvaginal ultrasound examination. View: Sufficient     Pregnancy   ========= "   Arnold pregnancy. Number of embryos: 1     Dating   ======   LMP on: 2024   GA by LMP 8 w + 2 d   JULIO C by LMP: 2025   Ultrasound examination on: 7/10/2024   GA by U/S based upon: CRL   GA by U/S 8 w + 1 d   JULIO C by U/S: 2025   Assigned: based on ultrasound (CRL), selected on 07/10/2024   Assigned GA 8 w + 1 d   Assigned JULIO C: 2025     Assessment   ==========   Gestational sac: visualized   Location: intrauterine   Yolk sac: visualized   Amniotic sac: visualized   Embryo: visualized   CRL 17.1 mm 8w 1d [place]   Cardiac activity: present    bpm     Maternal Structures   ===============   Uterus / Cervix   Uterus: Normal   Cervix: Visualized   Approach: Transvaginal   Ovaries / Tubes / Adnexa   Rt ovary: Normal   Rt ovary D1 31 mm   Rt ovary D2 26 mm   Rt ovary D3 30 mm   Rt ovary Vol 12.7 cmï¿½   Lt ovary: Normal   Lt ovary D1 39 mm   Lt ovary D2 48 mm   Lt ovary D3 28 mm   Lt ovary Vol 27.8 cmï¿½   Lt ovarian corpus luteum: visualized   Lt ovarian corpus luteum D1 20.0 mm   Lt ovarian corpus luteum D2 17.0 mm   Lt ovarian corpus luteum D3 22.0 mm   Cul de Sac / Bladder / Kidneys / Other   Free fluid: No free fluid visualized     Impression   =========   A arnold living IUP is identified. JULIO C is assigned based on the CRL from today?s study. If other clinical data, such as IVF conception dating or prior ultrasound   assignment of JULIO C differs from the JULIO C assigned today, please contact the [facility] department so that this report can be revised to reflect the correct JULIO C.   The maternal adnexae are normal in appearance. The amount of free fluid seen in the pelvis is within normal physiologic range.     PLAN:Routine prenatal care    MEDICATIONS PRESCRIBED:  PNV    LABS AND TESTS ORDERED:  New Ob Labs      1st TRIMESTER COUNSELING: Discussed all, booklet provided  Common complaints of pregnancy  HIV and other routine prenatal tests including  genetic screening  Risk factors identified  by prenatal history  Anticipated course of prenatal care  Nutrition and weight gain counseling  Toxoplasmosis precautions (Cats/Raw Meat)  Sexual activity and exercise  Environmental/Work hazards  Travel  Tobacco (Ask, Advise, Assess, Assist, and Arrange), as well as alcohol and drug use  Use of any medications (Including supplements, Vitamins, Herbs, or OTC Drugs)  Indications for Ultrasound  Domestic violence  Seat belt use  Childbirth classes/Hospital facilities     TERATOLOGY COUNSELING: Discussed options for SS, MT21 and carrier screening. Pt will let us know her desires. Discussed time constraints on SS      FOLLOW-UP for a New Ob Visit in   4   weeks with   -discussed to call clinic or L&D/ER if after hours for pain/bleeding  -discussed otc meds/tx for NV she will let me know if rx needed

## 2024-07-10 NOTE — PATIENT INSTRUCTIONS
LABOR AND DELIVERY PHONE NUMBER, [phone] (OPEN , LOCATED ON 6TH FLOOR OF HOSPITAL)  [address] PHONE NUMBER, [phone] (OPEN MON-FRI, 8a-5p)     1) Eat small frequent meals through the day versus three large meals  2) Try ginger ale or sprite to help settle the stomach - you can also take ginger capsules (250mg 4 times per day)  3) Eat crackers or dry toast before getting out of bed in the morning   4) Stay hydrated by drinking plenty of water-do not immediately eat or drink something after vomiting. Give your stomach a rest for 20-30 minutes. Slowly reintroduce ice chips, small sips water, crackers, etc.    5) Try OTC unisom (1/2 tablet) and vitamin B6 - take the 25mg b6 twice a day and then both together at night before bed. This can help with the nausea in the morning and is safe to use during pregnancy.  6) Sea bands (Accupressure wrist bands)    If you are unable to keep anything down and constantly vomiting for more that 24 hours, let the office know so that dehydration can be avoided. We would recommend being seen in the emergency department if this is the case.       Topic  General Pregnancy Information Recommended   (Unless Otherwise Contraindicated Or Restrictions Given To You By Your OB Doctor)      1. Anticipated course of prenatal care      Visits: will be Every 4 wks until 28 weeks, then every 2 weeks until 36 weeks, and then weekly until delivery.   Urine will be collected at each Obstetric visit        2. Nutrition and weight gain    Daily pre-tete vitamin (recommend taking at night)   Additional 300 calories needed daily  No Sushi, hotdogs, unpasteurized products (milk/cheeses). No large fish such as: shark, rodriguez mackerel, tile, sword fish   Incorporate 12 ounces of smaller seafood/week and no more than 6oz of albacore tuna   Caffeine: 200 mg/day or 2 cups of caffeine/day   Weight gain recommendations are based off of BMI before pregnancy. Generally patients who with normal weight prior  pregnancy it is recommended 25-35 pounds of weight gain during the pregnancy with an estimated weekly gain of 1 pound/wk in 2nd and 3rd Trimester.    3. Toxoplasmosis precautions  If cats are in the home avoid changing litter boxes and if you need to change the litter box recommended you use gloves   4. Sexual Activity  Sexual activity is okay unless you are put on restrictions by your provider. I recommend urinating after intercourse    5. Exercise  Generally pre-pregnancy routine is okay to continue   Drink plenty fluids for hydration   Stop any activity that causes heavy cramping like a period or bright red bleeding and contact your provider  No extreme or contact sports   No exercise on your back for an extended period of time after 20 weeks    6. Hot Tub/Saunas  Avoid hot tubes and saunas    7. Hair Treatments  Because of the lack of scientific studies on the effects of chemical treatments on your hair, we must advise that you do it at your own risk. If you choose to treat your hair, we recommend that you wait until after 12 weeks gestation. At this time there is no reason to believe that normal hair treatment is associated with onsequences to the baby.    8. Vaccines  Influenza vaccine is recommended by CDC during flu season   Tdap (pertussis or whopping cough) recommended each pregnancy between 27 and 36 weeks   Tdap booster recommended for family and other planned direct caregivers    9. Water  Water is an important nutrient in a good diet. However, it cannot be stressed enough that during pregnancy water is essential. The body has increased circulation through blood vessels, and without a large increase in water, pregnant women will be dehydrated. It plays an important role in decreasing constipation, preventing  contractions, decreasing swelling, and preventing dizziness. We recommend that you drink 8-10 glasses of water per day.    10. Smoking/Alcohol/Illicit Drug Use  No safe Level   Can lead to  problems with pregnancy   Growth of the developing fetus    labor (delivery before 37 weeks)    rupture of the membranes (water breaking before 37 weeks)   Premature separation of the placenta (which may cause bleeding)   American College of Obstetricians and Gynecologists endorses abstinence   Can lead to babies with disabilities    11. Environmental or work hazards  Unless otherwise restricted you may continue work throughout the pregnancy   Notify your provider of any work hazards or chemical exposure concerns   12. Travel    Safe to travel up to 35 weeks   Continue to wear a seatbelt and airbags are still recommended   Drink plenty fluids   Blood clots are a concern during pregnancy with long travel. Recommend compression stockings and moving around at least every 2 hours and staying hydrated.    13. Use of medications, vitamins, herbs, OTC drugs    Any medications not on the list provided to you from our clinic or given to you by one of our providers we recommend calling to make sure the medication is safe for you and baby.    14. Domestic Violence    Please notify office immediately of any concerns or violence so that we can help direct you to assistance needed   [place] [employer]: [phone]    15. Childbirth classes    List of Childbirth classes from [facility] is available    16. Selecting a Pediatrician  Selecting a pediatrician before delivery is recommended  You can interview pediatricians before delivery    17. Fetal Monitoring    A simple test of your babys well-being is a kick count. After 26 weeks, fetal motion of any kind should be monitored. Further discussion at that time   18.  Labor Signs    Water break, leaking fluids from Vagina prior 37 weeks  Regular contractions, Contractions that are more than 5-6/hour, getting stronger and painful with lower back pain, does not go away with rest and fluids    19. Postpartum Family Planning    Multiple  options available from short term methods to long term reversible and irreversible methods   Discuss with provider as you get closer to delivery    20. Dental    It is recommended that you get an annual dental cleaning    21. Breastfeeding    Classes offered at [facility] and it is recommended to take a class    22. Lifting In 2013, the [place] [employer]ational Safety and Health (NIOSH) published clinical guidelines for occupational lifting in uncomplicated pregnancies. The recommended weight limits are based on gestational age, intermittent versus repetitive lifting, time (hours/day) spent lifting, and lifting height from floor and distance in 3 front of body. In this guideline, the maximum permissible weight for a woman less than 20 weeks of gestation performing infrequent lifting is 36 pounds (16 kgs) and the maximum permissible weight at ?20 weeks is 26 pounds (12 kgs). For repetitive lifting ?1 hour/day, the maximum weights in the first and second half of pregnancy are 18 pounds (8 kgs) and 13 pounds (6 kgs), respectively, and for repetitive lifting <1 hour/day, the maximum weights are 30 pounds (14 kgs) and 22 pounds (10 kgs), respectively. Although not based on high quality evidence, these guidelines are a reasonable reference for counseling pregnant women     23. Scheduling and Provider Availability    Your Obstetric Doctor is usually here weekly but not every day. We recommend you make 3-4 advanced appointments at a time to accommodate your personal needs and work/school obligations.   We ask that you come 15 minutes prior your scheduled appointment.   For same day appointments (not routine appointments) there is a Nurse Practitioner or another obstetric provider available. Please let the  aware you are an OB patient requesting a same day appointment.      24. Recommended Phone [facility]      MEDICATIONS IN PREGNANCY     While some medications are considered safe to take  during pregnancy, the effects of other medications on your unborn baby are unknown. Therefore, it is very important to pay special attention to medications you take while you are pregnant.   If you were taking prescription medications before you became pregnant, please ask your health care provider about continuing these medications as soon as you find out that you are pregnant. Do not stop taking any medications without discussing with your health care provider. Your health care provider will weigh the benefits and risks to your pregnancy when making his or her recommendation. With some medications, the risk of not taking them may be more serious than the potential risk of taking them.   If you are prescribed any new medication, please inform your health care provider that you are pregnant. Be sure to discuss the risks and benefits of the newly prescribed medication with your health care provider before taking the medication. We are always available to answer any questions about new medications and how they relate to your pregnancy.     Are Alternative Pregnancy Medicine Therapies Safe?   Many pregnant women believe natural products can be safely used to relieve nausea, backache, and other annoying symptoms of pregnancy, but many of these so-called natural products have not been tested for their safety and effectiveness. Therefore, it is very important to check with your health care provider before taking any alternative therapies. She will not recommend a product or therapy until it is shown to be safe and effective.     Which Over the Counter Drugs Are Safe?   Prenatal vitamins, now available without a prescription, are safe and important to take during pregnancy. Ask your health care provider about the safety of taking other vitamins, herbal remedies and supplements during pregnancy. Most herbal preparations and supplements have not been proven to be safe during pregnancy. Generally, you should not take any  over-the-counter medication unless it is necessary. The following medications and home remedies have no known harmful effects during pregnancy when taken according to the package directions. If you want to know about the safety of any other medications not listed here, please contact your health care provider.      Problem:  Safe to Take:    Pain relief, headache, and fever  Acetaminophen - Tylenol, Anacin Aspirin-Free    Heartburn  Acid neutralizers - Maalox, Mylanta, Rolaids, Tums, Gaviscon   Histamine-blockers - Pepcid, Zantac, Prilosec    Gas pains and bloating  Simethicone - Gas-X, Maalox Anti-Gas, Mylanta Gas, Mylicon    Nausea  Kimberli - beverages, tablets, candies   Vitamin B6   Emetrol (if not diabetic)   Sea bands   Anti-histamines - Sleep-elvira, Benadryl, Bonnine, Dramamine    Cough  Guaifenesin (expectorant) - Hytuss, Mucinex, Robitussin   Dextromethorphan (antitussive) - Benylin, Delsym, Scot-Tussin DM   Guaifenesin plus dextromethorphan - Benylin Expectorant, Robitussin DM    Congestion  Pseudoephedrine - Sudafed, Actifed, Dristan, Neosynephrine   Vicks VapoRub   Saline nasal drops or spray    Sore throat  Throat lozenges - Sucrets, Cepacol, Cepastat, Ricola   Chloroseptic Spray   Warm salt/water gargle    Allergy relief  Chlorpheniramine - Chlor-Trimeton, Triaminic   Loratadine - Alavert, Claritin, Tavist ND, Triaminic Allerchews   Cetirizine - Zyrtec   Diphenhydramine -Benadryl, Diphenhist    Rashes  Hydrocortisone cream or ointment   Caladryl lotion or cream   Benadryl cream   Oatmeal bath (Aveeno)    Diarrhea  Loperamide - Imodium, Kaopectate, Maalox Anti-Diarrheal, Pepto Bismol    Constipation  Fiber supplements - Metamucil, Citrucel, Fiberall/Fibercon, Benefiber   Stool softeners - Colace, Senekot, Dulcolax   Milk of Magnesia    Hemorrhoids  Warm baths   Witch hazel preparations - Tucks medicated pads   Steroid preparations - Anusol-HC, Preparation H    Insomnia  Diphenhydramine - Benadryl, Unisom  SleepGels, Nytol, Sominex   Doxylamine succinate - Unisom Nighttime Sleep-Aid    First-aid ointments  Cortaid, Lanacort, Polysporin, Bacitracin, Neosporin    Yeast infections  Call office for appointment      Connected MOM   Using Wireless Technology to Manage Your Prenatal Health   Congratulations! Your team here at [facility] is excited for the upcoming addition to your family and is ready to support you over the course of your pregnancy. One of the ways we are prepared to help you is through our exciting new Digital Medicine Program, Connected MOM. Connected MOM stands for Connected Maternity Online Monitoring and is offered free of charge as a way to help our expectant mothers manage their pregnancy with fewer visits to the obstetrician.    In the fast-paced environment we live in, patients demand convenient, reliable access to healthcare at their fingertips. Recommended by The American College of Obstetricians and Gynecologists (ACOG), the standard prenatal care model for low-risk pregnancies can average anywhere between 12-14 in-office prenatal visits.    Through this innovative program, participating mothers-to-be receive a wireless scale, wireless blood pressure cuff and an at-home urine protein test kit. Through the use of a smartphone, patients can easily send their weight, blood pressure readings and urine protein test results digitally in real-time from the comfort of their own homes. Results are sent directly to their [facility] account, the systems online patient portal which connects directly to the patients Electronic Medical Record. A specialized Connected MOM care team - comprised of the patients obstetrician, a dedicated health  and a  - reviews the data and provides medical recommendations as needed. This allows expectant mothers to receive care proactively, wherever they are, while minimizing the need for in-person visits and thus minimizing  disruption to their regular daily activities.    How it Works At the initial prenatal visit, interested patients can work with their obstetrician to sign up for the program. After the appointment, expectant mothers can head to the [email or web address], a first-of-its-kind retail experience created by [facility] offering the latest in cutting-edge, interactive healthcare technology, to receive a Connected MOM kit containing all of the digital tools needed for remote monitoring. Once enrolled, patients weigh themselves regularly and take their blood pressure once a week. Instead of coming to three office appointments at weeks 20, 30, 37 the patient will have the appointment at home. They will take their blood pressure, weight and perform three at-home urine protein tests at these home visits. Results are directly transmitted into the EMR, and notifications and messages from the care team are communicated via [facility].     TECH SUPPORT [phone]  [email or web address]      Chromosomal testing  The sequential screen is a test done around 11-13 weeks that consists of an ultrasound that measures the nuchal fold (neck thickness) of baby and blood work on the same day. You get a second set of labs done a few weeks later after 15 weeks. Based on all three of these results, they are able to tell you if you are considered to be low risk or high risk regarding neural tube defects and chromosomal abnormalities like Down Syndrome. If you are at all interested, I usually place the order today so we do not miss the window. Someone will give you a phone call in a week or two to schedule this. If you do not want it, just tell them no thank you. This test is typically covered by your insurance and is performed by the Maternal Fetal Medicine (MFM) department here at [facility]. It will not tell you the sex of the baby.     OR     2.  Call [phone] to see about coverage and any out of pocket costs regarding the [id number] (MT21) testing.  This is done any day after 11 weeks and is blood work only. It checks for any chromosomal abnormalities like Down Syndrome. You can also find out the sex of the baby if you choose to know. Once you find out coverage and decide to proceed, send either myself or your doctor a message and we can see what date you can do it. It is done at our second floor lab at [facility].

## 2024-07-12 LAB — BACTERIA UR CULT: NORMAL

## 2024-07-13 LAB
C TRACH DNA SPEC QL NAA+PROBE: NOT DETECTED
N GONORRHOEA DNA SPEC QL NAA+PROBE: NOT DETECTED

## 2024-07-22 ENCOUNTER — TELEPHONE (OUTPATIENT)
Dept: OBSTETRICS AND GYNECOLOGY | Facility: CLINIC | Age: 34
End: 2024-07-22
Payer: MEDICAID

## 2024-07-22 NOTE — TELEPHONE ENCOUNTER
Spoke with pt, LSIL/+HPV other and need for colpo, discussed procedure. Will do at [facility]. Pt agreeable.

## 2024-08-02 ENCOUNTER — TELEPHONE (OUTPATIENT)
Dept: OBSTETRICS AND GYNECOLOGY | Facility: CLINIC | Age: 34
End: 2024-08-02
Payer: MEDICAID

## 2024-08-02 NOTE — TELEPHONE ENCOUNTER
Spoke with pt in regards to rescheduling for colpo. pt request to cancel the colpo at this time due to not being able to reschedule because of her job and will talk to Dr. Han at visit about the colpo. Pt verbalized understanding

## 2024-08-07 ENCOUNTER — INITIAL PRENATAL (OUTPATIENT)
Dept: OBSTETRICS AND GYNECOLOGY | Facility: CLINIC | Age: 34
End: 2024-08-07
Payer: MEDICAID

## 2024-08-07 VITALS — DIASTOLIC BLOOD PRESSURE: 71 MMHG | SYSTOLIC BLOOD PRESSURE: 105 MMHG | WEIGHT: 245.38 LBS | BODY MASS INDEX: 39.6 KG/M2

## 2024-08-07 DIAGNOSIS — R63.8 INCREASED BMI: ICD-10-CM

## 2024-08-07 DIAGNOSIS — Z86.19 HISTORY OF POSITIVE PCR FOR HERPES SIMPLEX VIRUS TYPE 2 (HSV-2) DNA: ICD-10-CM

## 2024-08-07 DIAGNOSIS — Z34.01 NORMAL FIRST PREGNANCY CONFIRMED, CURRENTLY IN FIRST TRIMESTER: Primary | ICD-10-CM

## 2024-08-07 DIAGNOSIS — Z86.19 HISTORY OF HERPES GENITALIS: ICD-10-CM

## 2024-08-07 PROCEDURE — 99212 OFFICE O/P EST SF 10 MIN: CPT | Mod: PBBFAC,TH | Performed by: STUDENT IN AN ORGANIZED HEALTH CARE EDUCATION/TRAINING PROGRAM

## 2024-08-07 PROCEDURE — 99999 PR PBB SHADOW E&M-EST. PATIENT-LVL II: CPT | Mod: PBBFAC,,, | Performed by: STUDENT IN AN ORGANIZED HEALTH CARE EDUCATION/TRAINING PROGRAM

## 2024-08-07 PROCEDURE — 99203 OFFICE O/P NEW LOW 30 MIN: CPT | Mod: S$PBB,TH,, | Performed by: STUDENT IN AN ORGANIZED HEALTH CARE EDUCATION/TRAINING PROGRAM

## 2024-08-07 RX ORDER — VALACYCLOVIR HYDROCHLORIDE 500 MG/1
500 TABLET, FILM COATED ORAL 2 TIMES DAILY
Qty: 30 TABLET | Refills: 3 | Status: SHIPPED | OUTPATIENT
Start: 2024-08-07 | End: 2024-08-12

## 2024-08-07 RX ORDER — ASPIRIN 81 MG/1
81 TABLET ORAL DAILY
COMMUNITY
Start: 2024-08-07 | End: 2025-05-14

## 2024-08-12 ENCOUNTER — PATIENT MESSAGE (OUTPATIENT)
Dept: ADMINISTRATIVE | Facility: OTHER | Age: 34
End: 2024-08-12
Payer: MEDICAID

## 2024-08-13 ENCOUNTER — TELEPHONE (OUTPATIENT)
Dept: OBSTETRICS AND GYNECOLOGY | Facility: CLINIC | Age: 34
End: 2024-08-13
Payer: MEDICAID

## 2024-08-13 NOTE — TELEPHONE ENCOUNTER
Spoke with pt in regards to MT21. With Dr. Han's reviewing all results are negative. Pt verbalized understanding

## 2024-09-03 ENCOUNTER — PATIENT MESSAGE (OUTPATIENT)
Dept: OTHER | Facility: OTHER | Age: 34
End: 2024-09-03
Payer: MEDICAID

## 2024-09-04 ENCOUNTER — PROCEDURE VISIT (OUTPATIENT)
Dept: OBSTETRICS AND GYNECOLOGY | Facility: CLINIC | Age: 34
End: 2024-09-04
Payer: MEDICAID

## 2024-09-04 ENCOUNTER — TELEPHONE (OUTPATIENT)
Dept: OBSTETRICS AND GYNECOLOGY | Facility: CLINIC | Age: 34
End: 2024-09-04
Payer: MEDICAID

## 2024-09-04 VITALS
WEIGHT: 248.25 LBS | HEIGHT: 66 IN | BODY MASS INDEX: 39.9 KG/M2 | SYSTOLIC BLOOD PRESSURE: 108 MMHG | DIASTOLIC BLOOD PRESSURE: 66 MMHG

## 2024-09-04 DIAGNOSIS — R63.8 INCREASED BMI: ICD-10-CM

## 2024-09-04 DIAGNOSIS — Z34.02 NORMAL FIRST PREGNANCY CONFIRMED, CURRENTLY IN SECOND TRIMESTER: Primary | ICD-10-CM

## 2024-09-04 DIAGNOSIS — Z86.19 HISTORY OF HERPES GENITALIS: ICD-10-CM

## 2024-09-04 DIAGNOSIS — R87.612 LGSIL ON PAP SMEAR OF CERVIX: ICD-10-CM

## 2024-09-04 PROCEDURE — 57452 EXAM OF CERVIX W/SCOPE: CPT | Mod: PBBFAC | Performed by: STUDENT IN AN ORGANIZED HEALTH CARE EDUCATION/TRAINING PROGRAM

## 2024-09-04 NOTE — PROGRESS NOTES
"Pregnancy dating, labs, ultrasound reports, prenatal testing, and problem list; prior records and results; and available outside records were reviewed and updated in EMR.  Pertinent findings were noted below.    Reason for Visit   No chief complaint on file.    HPI   33 y.o., at 16w1d by Estimated Date of Delivery: 2/18/25    Here for CHRISTOPHER and colpo. Just feeling tired. No cramping, vaginal bleeding, leakage of fluid. No FM yet.      Exam   /66   Ht 5' 6" (1.676 m)   Wt 112.6 kg (248 lb 3.8 oz)   LMP 05/13/2024 (Exact Date)   BMI 40.07 kg/m²     GENERAL: No acute distress  ABD: Gravid  FHT: 140 bpm    Assessment and Plan   Normal first pregnancy confirmed, currently in second trimester    Increased BMI    History of herpes genitalis    LGSIL on Pap smear of cervix        -- Colpo done today. See procedure note.  -- Offered AFP screen for NTD. She declines.  -- Anatomy scan scheduled 9/30.  -- RTC in 4 weeks.      Ellen Han MD    "

## 2024-09-04 NOTE — PROCEDURES
Colposcopy    Date/Time: 2024 9:00 AM    Performed by: Ellen Han MD  Authorized by: Ellen Han MD    Consent obatined:  Prior to procedure the appropriate consent was completed and verified  Assistants?: Yes    List of assistants:  Nevin Holt    Colposcopy Site:  Cervix  Position:  Supine  Acrowhite Lesion: No    Atypical Vessels: No    Transformation Zone Adequate?: Yes    Biopsy?: No    ECC Performed?: No    LEEP Performed?: No    Estimated blood loss (cc):  0   Patient tolerated the procedure well with no immediate complications.      COLPOSCOPY PROCEDURE NOTE:  2024     Purvi Ann is a 33 y.o. female   presents for colposcopy.  Patient's last menstrual period was 2024 (exact date)..  Her most recent pap smear shows low-grade squamous intraepithelial neoplasia (LGSIL - encompassing HPV,mild dysplasia,ATQUERIA I). HPV was other high risk positive.    The abnormal test findings were discussed, as well as HPV infection, need for colposcopy and possible biopsies to determine the plan of care, treatments available, the minimal risk of bleeding and infection with colposcopy, and alternatives to colposcopy and she agrees to proceed. Written consents signed. UPT is positive.    COLPOSCOPY EXAM:     Timeout performed     The vulva and vagina were grossly inspected and found to be normal.    No visible lesions, No mosaicism, No punctation, and No abnormal vasculature  Squamocolumnar junction visualized: Yes.  No biopsy was taken. No ECC was performed due to pregnancy.    The speculum was removed.  The patient did tolerate the procedure well.    All collected specimens sent to pathology for histologic analysis.    Colposcopic impression: benign.    Post-colposcopy counseling:  The patient was instructed to manage post-colposcopy cramping with NSAIDs or Tylenol, or with a prescription per the medication card.  Avoid intercourse, douching, or tampons in the vagina for at  least 2-3 days.  Expect a clumpy blackish discharge due to Monsel's solution application for several days.  Report heavy bleeding, worsening pain or pain that does not respond to above medications, or foul-smelling vaginal discharge. HPV vaccine recommended according to FDA age guidelines.  Importance of follow-up stressed.      Follow up based on colposcopy results.    Ellen Han     yes

## 2024-09-10 ENCOUNTER — PATIENT MESSAGE (OUTPATIENT)
Dept: OTHER | Facility: OTHER | Age: 34
End: 2024-09-10
Payer: MEDICAID

## 2024-09-30 ENCOUNTER — ROUTINE PRENATAL (OUTPATIENT)
Dept: OBSTETRICS AND GYNECOLOGY | Facility: CLINIC | Age: 34
End: 2024-09-30
Payer: MEDICAID

## 2024-09-30 ENCOUNTER — PROCEDURE VISIT (OUTPATIENT)
Dept: MATERNAL FETAL MEDICINE | Facility: CLINIC | Age: 34
End: 2024-09-30
Payer: MEDICAID

## 2024-09-30 VITALS — DIASTOLIC BLOOD PRESSURE: 72 MMHG | BODY MASS INDEX: 39.78 KG/M2 | SYSTOLIC BLOOD PRESSURE: 110 MMHG | WEIGHT: 246.5 LBS

## 2024-09-30 DIAGNOSIS — Z36.2 ENCOUNTER FOR FOLLOW-UP ULTRASOUND OF FETAL ANATOMY: Primary | ICD-10-CM

## 2024-09-30 DIAGNOSIS — Z86.19 HISTORY OF HERPES GENITALIS: ICD-10-CM

## 2024-09-30 DIAGNOSIS — Z34.02 NORMAL FIRST PREGNANCY CONFIRMED, CURRENTLY IN SECOND TRIMESTER: Primary | ICD-10-CM

## 2024-09-30 DIAGNOSIS — Z34.01 NORMAL FIRST PREGNANCY CONFIRMED, CURRENTLY IN FIRST TRIMESTER: ICD-10-CM

## 2024-09-30 DIAGNOSIS — R63.8 INCREASED BMI: ICD-10-CM

## 2024-09-30 PROCEDURE — 99999 PR PBB SHADOW E&M-EST. PATIENT-LVL II: CPT | Mod: PBBFAC,,, | Performed by: STUDENT IN AN ORGANIZED HEALTH CARE EDUCATION/TRAINING PROGRAM

## 2024-09-30 PROCEDURE — 99212 OFFICE O/P EST SF 10 MIN: CPT | Mod: PBBFAC,TH,PN | Performed by: STUDENT IN AN ORGANIZED HEALTH CARE EDUCATION/TRAINING PROGRAM

## 2024-09-30 PROCEDURE — 99212 OFFICE O/P EST SF 10 MIN: CPT | Mod: S$PBB,TH,, | Performed by: STUDENT IN AN ORGANIZED HEALTH CARE EDUCATION/TRAINING PROGRAM

## 2024-09-30 PROCEDURE — 76811 OB US DETAILED SNGL FETUS: CPT | Mod: PBBFAC | Performed by: OBSTETRICS & GYNECOLOGY

## 2024-09-30 NOTE — PROGRESS NOTES
Pregnancy dating, labs, ultrasound reports, prenatal testing, and problem list; prior records and results; and available outside records were reviewed and updated in EMR.  Pertinent findings were noted below.    Reason for Visit   Routine Prenatal Visit    HPI   33 y.o., at 19w6d by Estimated Date of Delivery: 2/18/25    Here for CHRISTOPHER. Tired today, had gender reveal yesterday. Its a boy! Having bilateral round ligament pain, L worse than R.  No contractions, vaginal bleeding, leakage of fluid. No FM yet.      Exam   /72   Wt 111.8 kg (246 lb 7.6 oz)   LMP 05/13/2024 (Exact Date)   BMI 39.78 kg/m²     TWG: -2 pounds    GENERAL: No acute distress  ABD: Gravid    Assessment and Plan   Normal first pregnancy confirmed, currently in second trimester  -     OB GLUCOSE SCREEN; Future; Expected date: 09/30/2024  -     CBC W/ AUTO DIFFERENTIAL; Future; Expected date: 09/30/2024    Increased BMI    History of herpes genitalis      -- Anatomy scan today.  -- 2T labs ordered.  -- RTC in 4-5 weeks.      Ellen Han MD

## 2024-10-01 ENCOUNTER — PATIENT MESSAGE (OUTPATIENT)
Dept: OTHER | Facility: OTHER | Age: 34
End: 2024-10-01
Payer: MEDICAID

## 2024-10-07 ENCOUNTER — TELEPHONE (OUTPATIENT)
Dept: OBSTETRICS AND GYNECOLOGY | Facility: CLINIC | Age: 34
End: 2024-10-07
Payer: MEDICAID

## 2024-10-07 ENCOUNTER — ROUTINE PRENATAL (OUTPATIENT)
Dept: OBSTETRICS AND GYNECOLOGY | Facility: CLINIC | Age: 34
End: 2024-10-07
Payer: MEDICAID

## 2024-10-07 VITALS
DIASTOLIC BLOOD PRESSURE: 72 MMHG | BODY MASS INDEX: 39.53 KG/M2 | SYSTOLIC BLOOD PRESSURE: 110 MMHG | WEIGHT: 244.94 LBS

## 2024-10-07 DIAGNOSIS — O99.891 BACK PAIN AFFECTING PREGNANCY IN SECOND TRIMESTER: ICD-10-CM

## 2024-10-07 DIAGNOSIS — M54.9 BACK PAIN AFFECTING PREGNANCY IN SECOND TRIMESTER: ICD-10-CM

## 2024-10-07 DIAGNOSIS — Z34.00 SUPERVISION OF NORMAL FIRST PREGNANCY, ANTEPARTUM: Primary | ICD-10-CM

## 2024-10-07 PROCEDURE — 87086 URINE CULTURE/COLONY COUNT: CPT | Performed by: NURSE PRACTITIONER

## 2024-10-07 PROCEDURE — 99213 OFFICE O/P EST LOW 20 MIN: CPT | Mod: PBBFAC,TH,PN | Performed by: NURSE PRACTITIONER

## 2024-10-07 PROCEDURE — 99999 PR PBB SHADOW E&M-EST. PATIENT-LVL III: CPT | Mod: PBBFAC,,, | Performed by: NURSE PRACTITIONER

## 2024-10-07 NOTE — TELEPHONE ENCOUNTER
----- Message from Sanna sent at 10/7/2024  8:18 AM CDT -----  Regarding: concerns  Name of Who is Calling: Purvi           What is the request in detail: Patient is requesting a call back in regards to left pains in back for 4 days. Symptom screen said to call 911 but she declined. She is 5 months pregnant.            Can the clinic reply by [facility]: Yes           What Number to Call Back if not in [facility]:  [phone]

## 2024-10-07 NOTE — PROGRESS NOTES
Here for routine OB appt at 20w6d, complaints of back pain. Reports onset over a week ago after her gender reveal.  Reports some lower abdominal pain- more to left side. She has tried increasing water intake.  No fever or chills.  No FM yet.  Denies VB and cramping.  Pain, bleeding, and PTL precautions given.  Urine culture sent.   Discussed round ligament pain. Can use Tylenol as needed, warm soaks, stretches and maternity belt for support. If pain persist can refer to PT.   OB glucose and CBC with next visit.   F/U scheduled 4 weeks

## 2024-10-08 LAB
BACTERIA UR CULT: NORMAL
BACTERIA UR CULT: NORMAL

## 2024-10-29 ENCOUNTER — LAB VISIT (OUTPATIENT)
Dept: LAB | Facility: OTHER | Age: 34
End: 2024-10-29
Attending: STUDENT IN AN ORGANIZED HEALTH CARE EDUCATION/TRAINING PROGRAM
Payer: MEDICAID

## 2024-10-29 ENCOUNTER — ROUTINE PRENATAL (OUTPATIENT)
Dept: OBSTETRICS AND GYNECOLOGY | Facility: CLINIC | Age: 34
End: 2024-10-29
Payer: MEDICAID

## 2024-10-29 ENCOUNTER — HOSPITAL ENCOUNTER (EMERGENCY)
Facility: OTHER | Age: 34
Discharge: HOME | End: 2024-10-29
Attending: OBSTETRICS & GYNECOLOGY
Payer: MEDICAID

## 2024-10-29 ENCOUNTER — PATIENT MESSAGE (OUTPATIENT)
Dept: OTHER | Facility: OTHER | Age: 34
End: 2024-10-29
Payer: MEDICAID

## 2024-10-29 VITALS
SYSTOLIC BLOOD PRESSURE: 100 MMHG | DIASTOLIC BLOOD PRESSURE: 74 MMHG | BODY MASS INDEX: 40.49 KG/M2 | WEIGHT: 250.88 LBS

## 2024-10-29 VITALS
SYSTOLIC BLOOD PRESSURE: 122 MMHG | OXYGEN SATURATION: 98 % | RESPIRATION RATE: 13 BRPM | TEMPERATURE: 98 F | HEART RATE: 85 BPM | DIASTOLIC BLOOD PRESSURE: 77 MMHG

## 2024-10-29 DIAGNOSIS — Z3A.24 24 WEEKS GESTATION OF PREGNANCY: ICD-10-CM

## 2024-10-29 DIAGNOSIS — Z36.89 NST (NON-STRESS TEST) REACTIVE ON FETAL SURVEILLANCE: Primary | ICD-10-CM

## 2024-10-29 DIAGNOSIS — Z34.02 NORMAL FIRST PREGNANCY CONFIRMED, CURRENTLY IN SECOND TRIMESTER: ICD-10-CM

## 2024-10-29 DIAGNOSIS — Z3A.24 24 WEEKS GESTATION OF PREGNANCY: Primary | ICD-10-CM

## 2024-10-29 DIAGNOSIS — Z34.00 SUPERVISION OF NORMAL FIRST PREGNANCY, ANTEPARTUM: ICD-10-CM

## 2024-10-29 LAB
BASOPHILS # BLD AUTO: 0.01 K/UL (ref 0–0.2)
BASOPHILS NFR BLD: 0.1 % (ref 0–1.9)
DIFFERENTIAL METHOD BLD: ABNORMAL
EOSINOPHIL # BLD AUTO: 0.1 K/UL (ref 0–0.5)
EOSINOPHIL NFR BLD: 0.4 % (ref 0–8)
ERYTHROCYTE [DISTWIDTH] IN BLOOD BY AUTOMATED COUNT: 14 % (ref 11.5–14.5)
GLUCOSE SERPL-MCNC: 86 MG/DL (ref 70–140)
HCT VFR BLD AUTO: 33.9 % (ref 37–48.5)
HGB BLD-MCNC: 11.1 G/DL (ref 12–16)
IMM GRANULOCYTES # BLD AUTO: 0.04 K/UL (ref 0–0.04)
IMM GRANULOCYTES NFR BLD AUTO: 0.4 % (ref 0–0.5)
LYMPHOCYTES # BLD AUTO: 3.4 K/UL (ref 1–4.8)
LYMPHOCYTES NFR BLD: 30.6 % (ref 18–48)
MCH RBC QN AUTO: 31.1 PG (ref 27–31)
MCHC RBC AUTO-ENTMCNC: 32.7 G/DL (ref 32–36)
MCV RBC AUTO: 95 FL (ref 82–98)
MONOCYTES # BLD AUTO: 0.5 K/UL (ref 0.3–1)
MONOCYTES NFR BLD: 4.2 % (ref 4–15)
NEUTROPHILS # BLD AUTO: 7.2 K/UL (ref 1.8–7.7)
NEUTROPHILS NFR BLD: 64.3 % (ref 38–73)
NRBC BLD-RTO: 0 /100 WBC
PLATELET # BLD AUTO: 308 K/UL (ref 150–450)
PMV BLD AUTO: 10 FL (ref 9.2–12.9)
RBC # BLD AUTO: 3.57 M/UL (ref 4–5.4)
WBC # BLD AUTO: 11.16 K/UL (ref 3.9–12.7)

## 2024-10-29 PROCEDURE — 59025 FETAL NON-STRESS TEST: CPT

## 2024-10-29 PROCEDURE — 99999 PR PBB SHADOW E&M-EST. PATIENT-LVL III: CPT | Mod: PBBFAC,,, | Performed by: FAMILY MEDICINE

## 2024-10-29 PROCEDURE — 99284 EMERGENCY DEPT VISIT MOD MDM: CPT | Mod: ,,, | Performed by: OBSTETRICS & GYNECOLOGY

## 2024-10-29 PROCEDURE — 99213 OFFICE O/P EST LOW 20 MIN: CPT | Mod: TH,S$PBB,25, | Performed by: FAMILY MEDICINE

## 2024-10-29 PROCEDURE — 85025 COMPLETE CBC W/AUTO DIFF WBC: CPT | Performed by: STUDENT IN AN ORGANIZED HEALTH CARE EDUCATION/TRAINING PROGRAM

## 2024-10-29 PROCEDURE — 82950 GLUCOSE TEST: CPT | Performed by: STUDENT IN AN ORGANIZED HEALTH CARE EDUCATION/TRAINING PROGRAM

## 2024-10-29 PROCEDURE — 36415 COLL VENOUS BLD VENIPUNCTURE: CPT | Performed by: STUDENT IN AN ORGANIZED HEALTH CARE EDUCATION/TRAINING PROGRAM

## 2024-10-29 PROCEDURE — 99213 OFFICE O/P EST LOW 20 MIN: CPT | Mod: PBBFAC,TH | Performed by: FAMILY MEDICINE

## 2024-10-29 PROCEDURE — 59025 FETAL NON-STRESS TEST: CPT | Mod: 26,,, | Performed by: OBSTETRICS & GYNECOLOGY

## 2024-10-29 PROCEDURE — 99284 EMERGENCY DEPT VISIT MOD MDM: CPT | Mod: 25,27

## 2024-10-29 PROCEDURE — 76815 OB US LIMITED FETUS(S): CPT | Mod: 26,,, | Performed by: OBSTETRICS & GYNECOLOGY

## 2024-10-29 NOTE — ED PROVIDER NOTES
Encounter Date: 10/29/2024       History   No chief complaint on file.    Purvi Ann is a 33 y.o. [id number] at 24w0d who presents to the OB ED for fetal monitoring following clinic appointment where heart tones were heard around the 80s using handheld doppler. Per report, unsure if doppler was picking up on maternal or fetal heart rate, so patient was recommended to come to OB ED for NST. She has no complaints at this time and denies contractions, vaginal bleeding, LOF. She reports good fetal movement.      This IUP is complicated by HSV, adjustment disorder w/depressed mood, obesity.      Review of patient's allergies indicates:   Allergen Reactions    Ceftriaxone Edema     Tolerates penicillin with no issues    Percocet [oxycodone-acetaminophen]      Past Medical History:   Diagnosis Date    Allergy     HSV anogenital infection     Recurrent upper respiratory infection (URI)      Past Surgical History:   Procedure Laterality Date    NO PAST SURGERIES       Family History   Problem Relation Name Age of Onset    Colon cancer Father      Cancer Father  46        colon ca    Diabetes Mother      Hypertension Mother      Breast cancer Neg Hx      Eclampsia Neg Hx      Miscarriages / Stillbirths Neg Hx      Ovarian cancer Neg Hx       labor Neg Hx      Stroke Neg Hx      Cervical cancer Neg Hx      Vaginal cancer Neg Hx      Endometrial cancer Neg Hx       Social History     Tobacco Use    Smoking status: Never    Smokeless tobacco: Never   Substance Use Topics    Alcohol use: Not Currently     Comment: social    Drug use: No     Review of Systems   Constitutional:  Negative for chills and fever.   HENT:  Negative for congestion and sore throat.    Respiratory:  Negative for cough and shortness of breath.    Cardiovascular:  Negative for chest pain.   Gastrointestinal:  Negative for abdominal pain, nausea and vomiting.   Genitourinary:  Negative for dysuria and vaginal bleeding.    Neurological:  Negative for seizures and headaches.       Physical Exam     Initial Vitals [10/29/24 1406]   BP Pulse Resp Temp SpO2   125/62 88 13 98.1 °F (36.7 °C) 100 %      MAP       --         Physical Exam    Nursing note and vitals reviewed.  Constitutional: She appears well-developed and well-nourished. She is not diaphoretic. No distress.   HENT:   Head: Normocephalic and atraumatic.   Nose: Nose normal.   Eyes: Conjunctivae and EOM are normal.   Neck:   Normal range of motion.  Cardiovascular:  Normal rate and intact distal pulses.           Pulmonary/Chest: No respiratory distress.   Abdominal: Abdomen is soft. There is no abdominal tenderness. There is no rebound and no guarding.   Musculoskeletal:         General: No tenderness or edema. Normal range of motion.      Cervical back: Normal range of motion.     Neurological: She is alert and oriented to person, place, and time.   Skin: Skin is warm and dry.   Psychiatric: She has a normal mood and affect. Her behavior is normal.         ED Course   Obtain Fetal nonstress test (NST)    Date/Time: 10/29/2024 2:33 PM    Performed by: Zahira Altamirano MD  Authorized by: Zahira Altamirano MD    Nonstress Test:     Variability:  6-25 BPM    Decelerations:  Variable    Accelerations:  15 bpm    Baseline:  140    Uterine Irritability: No      Contractions:  Not present  Biophysical Profile:     Nonstress Test Interpretation: appropriate for gestational age      Overall Impression:  Reassuring  Post-procedure:     Patient tolerance:  Patient tolerated the procedure well with no immediate complications    Labs Reviewed - No data to display       Imaging Results    None       Transabdominal Ultrasound for Maximum Vertical Pocket  10/29/2024         Maximum vertical pocket: 7 cm  Good fetal movement and tone demonstrated throughout exam and demonstrated to patient.    Impression: Adequate fluid, reassuring. Incidental BPP 8/8 within 5 minutes.       Medications -  No data to display  Medical Decision Making  Purvi Ann is a 33 y.o. [id number] at 24w0d who presents to the OB ED for fetal monitoring. Heart tones audible in 80s on handheld doppler in clinic, unsure if doppler was detecting maternal or fetal heart rate per report.    BP: (100)/(74) 100/74    NST: as above; intermittent variable decelerations  [place]: no ctx/irritability  MVP 7.11 cm    Patient without complaints and NST reactive with moderate variability and appropriate for gestational age. BSUS revealing adequate fluid and very active fetus with excellent tone, incidental BPP 8/8. Deemed stable for discharge at this time with ED return precautions given. Patient voices understanding and is in agreement with the plan.    Zahira Altamirano MD  OB/GYN PGY-1              Attending Attestation:   Physician Attestation Statement for Resident:  As the supervising MD   Physician Attestation Statement: I have personally seen and examined this patient.   I agree with the above history.  -:   As the supervising MD I agree with the above PE.     As the supervising MD I agree with the above treatment, course, plan, and disposition.   I was personally present during the critical portions of the procedure(s) performed by the resident and was immediately available in the ED to provide services and assistance as needed during the entire procedure.  I have reviewed and agree with the residents interpretation of the following: lab data.  I have reviewed the following: old records at this facility.            Attending ED Notes:   I have personally seen and examined the patient. I agree with the resident's note . Questions welcomed and answered to patient satisfaction.      @ 24w0d  presenting for NST  NST: appropriate for gestational age  USG: good FM and subjectively normal fluid  Reassurance provided    Agree with discharge to home, and given the following instructions: Resume normal activities, encouraged to  hydrate well (3L or 100oz of fluids daily). Return to the OB ED for acute concerns such as vaginal bleeding, frequent or painful contractions, loss of fluid or decreased fetal movement.     Return to clinic as scheduled.     [place] Joshi MD  10/30/2024 12:53 AM                                 Clinical Impression:  Final diagnoses:  [Z3A.24] 24 weeks gestation of pregnancy  [Z36.89] NST (non-stress test) reactive on fetal surveillance (Primary)          ED Disposition Condition    Discharge Stable          ED Prescriptions    None       Follow-up Information       Follow up With Specialties Details Why Contact Info    [facility] - Emergency Dept (Obstetrics) Emergency Medicine Go to  If symptoms worsen [address] [facility] [id number] [phone]    Ellen Han MD Obstetrics and Gynecology In 4 weeks  [address] [address]  [phone]               Zahira Altamirano MD  Resident  10/29/24 1803       [place] Rose MD  10/30/24 0056     (3) slightly limited

## 2024-10-29 NOTE — DISCHARGE INSTRUCTIONS
Call clinic [phone] or L & D after hours at [phone] for vaginal bleeding, leakage of fluids, contractions 4-5 in one hour, decreased fetal movements ( 10 kicks in 2 hours), headache not relieved by Tylenol, blurry vision, or temp of 100.4 or greater.  Begin doing fetal kick counts, at least 10 movements in 2 hours starting at 28 weeks gestation.  Keep next clinic appointment

## 2024-11-05 ENCOUNTER — PATIENT MESSAGE (OUTPATIENT)
Dept: OBSTETRICS AND GYNECOLOGY | Facility: CLINIC | Age: 34
End: 2024-11-05
Payer: MEDICAID

## 2024-11-11 ENCOUNTER — PROCEDURE VISIT (OUTPATIENT)
Dept: MATERNAL FETAL MEDICINE | Facility: CLINIC | Age: 34
End: 2024-11-11
Payer: MEDICAID

## 2024-11-11 DIAGNOSIS — Z36.2 ENCOUNTER FOR FOLLOW-UP ULTRASOUND OF FETAL ANATOMY: ICD-10-CM

## 2024-11-11 PROCEDURE — 76816 OB US FOLLOW-UP PER FETUS: CPT | Mod: PBBFAC | Performed by: OBSTETRICS & GYNECOLOGY

## 2024-11-12 ENCOUNTER — PATIENT MESSAGE (OUTPATIENT)
Dept: OTHER | Facility: OTHER | Age: 34
End: 2024-11-12
Payer: MEDICAID

## 2024-11-26 ENCOUNTER — ROUTINE PRENATAL (OUTPATIENT)
Dept: OBSTETRICS AND GYNECOLOGY | Facility: CLINIC | Age: 34
End: 2024-11-26
Payer: MEDICAID

## 2024-11-26 ENCOUNTER — PATIENT MESSAGE (OUTPATIENT)
Dept: OTHER | Facility: OTHER | Age: 34
End: 2024-11-26
Payer: MEDICAID

## 2024-11-26 VITALS
WEIGHT: 252.63 LBS | SYSTOLIC BLOOD PRESSURE: 112 MMHG | DIASTOLIC BLOOD PRESSURE: 74 MMHG | BODY MASS INDEX: 40.78 KG/M2

## 2024-11-26 DIAGNOSIS — Z86.19 HISTORY OF HERPES GENITALIS: ICD-10-CM

## 2024-11-26 DIAGNOSIS — Z34.03 NORMAL FIRST PREGNANCY CONFIRMED, CURRENTLY IN THIRD TRIMESTER: Primary | ICD-10-CM

## 2024-11-26 DIAGNOSIS — R63.8 INCREASED BMI: ICD-10-CM

## 2024-11-26 PROCEDURE — 99999 PR PBB SHADOW E&M-EST. PATIENT-LVL II: CPT | Mod: PBBFAC,,, | Performed by: STUDENT IN AN ORGANIZED HEALTH CARE EDUCATION/TRAINING PROGRAM

## 2024-11-26 PROCEDURE — 99212 OFFICE O/P EST SF 10 MIN: CPT | Mod: S$PBB,TH,, | Performed by: STUDENT IN AN ORGANIZED HEALTH CARE EDUCATION/TRAINING PROGRAM

## 2024-11-26 PROCEDURE — 99212 OFFICE O/P EST SF 10 MIN: CPT | Mod: PBBFAC,TH | Performed by: STUDENT IN AN ORGANIZED HEALTH CARE EDUCATION/TRAINING PROGRAM

## 2024-11-26 NOTE — PROGRESS NOTES
Pregnancy dating, labs, ultrasound reports, prenatal testing, and problem list; prior records and results; and available outside records were reviewed and updated in EMR.  Pertinent findings were noted below.    Reason for Visit   Routine Prenatal Visit    HPI   34 y.o., at 28w0d by Estimated Date of Delivery: 2/18/25    Here for CHRISTOPHER. Had some left sided pelvic pain over the weekend. Has since resolved. No constipation.  No contractions, vaginal bleeding, leakage of fluid. Reports normal fetal movement. Undecided for contraception. Has breast pump.      Exam   /74   Wt 114.6 kg (252 lb 10.4 oz)   LMP 05/13/2024 (Exact Date)   BMI 40.78 kg/m²     TWG: 3 pounds    GENERAL: No acute distress  ABD: Gravid    Assessment and Plan   Normal first pregnancy confirmed, currently in third trimester    Increased BMI    History of herpes genitalis      -- Tdap today.  -- RTC in 2 weeks.      Ellen Han MD

## 2024-11-26 NOTE — LETTER
November 26, 2024      [facility]  [address] [address]  Phone: [phone]  Fax: [phone]       Patient: Purvi Ann   YOB: 1990  Date of Visit: 11/26/2024    To Whom It May Concern:    SOY Ann  was at [facility] on 11/26/2024. The patient may return to work/school on 11/27/2024 with no restrictions. If you have any questions or concerns, or if I can be of further assistance, please do not hesitate to contact me.    Sincerely,    Ellen Han MD

## 2024-12-10 ENCOUNTER — PATIENT MESSAGE (OUTPATIENT)
Dept: OTHER | Facility: OTHER | Age: 34
End: 2024-12-10
Payer: MEDICAID

## 2024-12-12 ENCOUNTER — ROUTINE PRENATAL (OUTPATIENT)
Dept: OBSTETRICS AND GYNECOLOGY | Facility: CLINIC | Age: 34
End: 2024-12-12
Payer: MEDICAID

## 2024-12-12 VITALS
DIASTOLIC BLOOD PRESSURE: 84 MMHG | SYSTOLIC BLOOD PRESSURE: 118 MMHG | WEIGHT: 249.81 LBS | BODY MASS INDEX: 40.32 KG/M2

## 2024-12-12 DIAGNOSIS — O99.891 BACK PAIN AFFECTING PREGNANCY IN THIRD TRIMESTER: ICD-10-CM

## 2024-12-12 DIAGNOSIS — R63.8 INCREASED BMI: ICD-10-CM

## 2024-12-12 DIAGNOSIS — M54.9 BACK PAIN AFFECTING PREGNANCY IN THIRD TRIMESTER: ICD-10-CM

## 2024-12-12 DIAGNOSIS — Z34.03 NORMAL FIRST PREGNANCY CONFIRMED, CURRENTLY IN THIRD TRIMESTER: Primary | ICD-10-CM

## 2024-12-12 PROCEDURE — 99213 OFFICE O/P EST LOW 20 MIN: CPT | Mod: PBBFAC,TH,PN | Performed by: STUDENT IN AN ORGANIZED HEALTH CARE EDUCATION/TRAINING PROGRAM

## 2024-12-12 PROCEDURE — 99999 PR PBB SHADOW E&M-EST. PATIENT-LVL III: CPT | Mod: PBBFAC,,, | Performed by: STUDENT IN AN ORGANIZED HEALTH CARE EDUCATION/TRAINING PROGRAM

## 2024-12-12 NOTE — LETTER
December 12, 2024    Purvi Ann  [address] [facility] [place] [address]              [facility]  [address] [place] [address]  [address]  [place] [address]    To Whom It May Concern:    Ms. Ann is currently under our care for pregnancy.  Estimated Date of Delivery: 2/18/2025    Notification of General Job Limitations for Pregnancy    These are general guidelines for pregnant women in the workplace:    15-20 pound limit for pushing, pulling and lifting  It is recommended that expectant mothers that work in standing positions at repetitive tasks for 3 hours or more should take frequent rest breaks.   Expectant mothers that sit for extended periods of time should stand and walk around every two hours. This will improve circulation, reduce eyestrain, and relax back muscles.     Please call our office at [phone] if you have any further questions.          Sincerely,    Ellen Han MD

## 2024-12-18 ENCOUNTER — CLINICAL SUPPORT (OUTPATIENT)
Dept: REHABILITATION | Facility: OTHER | Age: 34
End: 2024-12-18
Attending: STUDENT IN AN ORGANIZED HEALTH CARE EDUCATION/TRAINING PROGRAM
Payer: MEDICAID

## 2024-12-18 DIAGNOSIS — O99.891 BACK PAIN AFFECTING PREGNANCY IN THIRD TRIMESTER: ICD-10-CM

## 2024-12-18 DIAGNOSIS — M54.41 ACUTE RIGHT-SIDED LOW BACK PAIN WITH RIGHT-SIDED SCIATICA: ICD-10-CM

## 2024-12-18 DIAGNOSIS — R29.3 ABNORMAL POSTURE: Primary | ICD-10-CM

## 2024-12-18 DIAGNOSIS — R53.1 DECREASED STRENGTH: ICD-10-CM

## 2024-12-18 DIAGNOSIS — M54.9 BACK PAIN AFFECTING PREGNANCY IN THIRD TRIMESTER: ICD-10-CM

## 2024-12-18 PROCEDURE — 97161 PT EVAL LOW COMPLEX 20 MIN: CPT | Mod: PN | Performed by: PHYSICAL THERAPIST

## 2024-12-18 PROCEDURE — 97110 THERAPEUTIC EXERCISES: CPT | Mod: KX,PN | Performed by: PHYSICAL THERAPIST

## 2024-12-18 NOTE — PLAN OF CARE
"[facility]   Physical Therapy Initial Evaluation      Name: Aj Hernándezre  Clinic Number: [id number]    Therapy Diagnosis:   Encounter Diagnoses   Name Primary?    Back pain affecting pregnancy in third trimester     Abnormal posture Yes    Decreased strength     Acute right-sided low back pain with right-sided sciatica         Physician: Ellen Han MD    Physician Orders: PT Eval and Treat   Medical Diagnosis from Referral: O99.891,M54.9 (ICD-10-CM) - Back pain affecting pregnancy in third trimester   Evaluation Date: 12/18/2024  Authorization Period Expiration: 12/12/2025  Plan of Care Expiration: 2/18/2025  Progress Note Due: 1/18/2025  Visit # / Visits authorized: 1/ 1   FOTO: First 12/18/2024      Time In: 8:15am  Time Out: 9:02am  Total Appointment Time (timed & untimed codes): 47 minutes    Precautions:  pregnant 3rd trimester Due Feb 18th   Subjective     Date of onset: Mid October 2024     History of current condition - AJ reports: Report lower back pain. Some times it up in the Left-sided upper back. She will feel it if she is moving around too much, standing for too long. Unsure if its her bad posture because she is pregnant. If the back  isnt hurting, she will get sciatic pain on the Right side. "Feels like someone pinched something" She will have to immediately get off the leg and shift her weight. This will radiated to mid posterior thigh. The pain will subside after a few seconds and she will fine This will occur when she is standing or sitting for too long on the toilet or an uncomfortable chair. She reports he feet will also fall asleep if she sits for too long. This is only the Right side. No other history of low back pain.     Pt is Due Feb 18th. This is her first pregnancy.      Medical History:   Past Medical History:   Diagnosis Date    Allergy     HSV anogenital infection 2022    Recurrent upper respiratory infection (URI)        Surgical " History:   Purvi Ann  has a past surgical history that includes No past surgeries.    Medications:   Purvi has a current medication list which includes the following prescription(s): aspirin, famotidine, guaifenesin, prenatal vit no.124/iron/folic, and valacyclovir, and the following Facility-Administered Medications: tdap.    Allergies:   Review of patient's allergies indicates:   Allergen Reactions    Ceftriaxone Edema     Tolerates penicillin with no issues    Percocet [oxycodone-acetaminophen]         Imaging, none    Prior Therapy: No prior PT for her back.   Social History: lives with their family  Occupation: Work for [employer]   Prior Level of Function: Independent  Current Level of Function: independent    Pain:  Current 0/10, worst 8/10, best 0/10   Location: right back   Description: Aching, Sharp, and radiating  Aggravating Factors: Sitting, Standing, and Walking  Easing Factors: heating pad    Pts goals: Would like to not have to call off from work.     Objective   Observation: pt appears well nourished and in no signs of distress  Posture: Pt stands with increased lumbar lordosis  Gait: pt ambulates with Right lumbar ext, Right shoulder lower than Left, decreased stance time on the Right, Right hip drop during Left stance phase.   Palpation: TTP SIJ    Functional Tests:  Squat: Just hard to do  Single leg balance: reduce balance on Right, not back pain   Single leg squat: Femoral add/IR B      Range of Motion/Strength:     Lumbar AROM Pain/Dysfunction with Movement   Flexion 50% limited Partially due to pregnancy   Extension 25% limited Pain on Right    Right side bending Full  Pain on R   Left side bending Full  No pain   Right rotation 25% limited Pain on R   Left rotation 25% limited Pain on R       L/E MMT Right Left Pain/Dysfunction with Movement   Hip Flexion/Hooklying 4-/5 4-/5    Hip Extension NT NT    Hip Abduction/PGM 3/5 3/5    Supine Hip IR 4+/5 4+/5    Supine  Hip ER 3+/5 3+/5      Pelvic Alignment:  Right posterior rotated innominate     Joint Mobility:   - SIJ: Hypomobility on the Right  - hip: Slight Right hip IR mobility deficits    Special Tests:   - Standing flexion test  + Supine to long sit test - Right short to long     CMS Impairment/Limitation/Restriction for FOTO Survey    Therapist reviewed FOTO scores for Aj Ann on 12/18/2024.   FOTO documents entered into [employer] - see Media section.    Intake 29 - no graph provided by FOTO       TREATMENT     Treatment Time In: 8:45  Treatment Time Out: 9:02  Total Treatment time separate from Evaluation: 17 minutes    AJ received the following manual therapy techniques: Joint mobilizations were applied to the: SIJ for 17 minutes, including:  MET rotated innominate x2  Pt educated on PLAN OF CARE and SIJ belt      Home Exercises and Patient Education Provided    Education provided:   - Pt educated on POC  - Pt educated on HEP  - Pt educated on anatomy and physiology of current condition as it relates to signs and symptoms.     Written Home Exercises Provided: yes.  Exercises were reviewed and AJ was able to demonstrate them prior to the end of the session.  AJ demonstrated good  understanding of the education provided.     See EMR under Patient Instructions for exercises provided 12/18/2024.    Assessment     Aj is a 34 y.o. female referred to outpatient Physical Therapy with a medical diagnosis of back pain affecting pregnancy. Patient presents with signs and symptoms of SIJ dysfunction. She reported improvement post manual. She was educated on obtaining and SIJ belt in order to assist with SIJ stabilization as the joint mobility will continue to worsen throughout pregnancy. PT to monitor response to mobilization and PT to repeat as needed.     Patient prognosis is Good.   Patient will benefit from skilled outpatient Physical Therapy to address the deficits stated above and in  the chart below, provide patient /family education, and to maximize patientt's level of independence.     Plan of care discussed with patient: Yes  Patient's spiritual, cultural and educational needs considered and patient is agreeable to the plan of care and goals as stated below:     Anticipated Barriers for therapy: pregnancy    Medical Necessity is demonstrated by the following  History  Co-morbidities and personal factors that may impact the plan of care [] LOW: no personal factors / co-morbidities  [x] MODERATE: 1-2 personal factors / co-morbidities  [] HIGH: 3+ personal factors / co-morbidities    Moderate / High Support Documentation:   Co-morbidities affecting plan of care: none    Personal Factors:   pregnancy     Examination  Body Structures and Functions, activity limitations and participation restrictions that may impact the plan of care [x] LOW: addressing 1-2 elements  [] MODERATE: 3+ elements  [] HIGH: 4+ elements (please support below)    Moderate / High Support Documentation: difficulty sitting or standing     Clinical Presentation [x] LOW: stable  [] MODERATE: Evolving  [] HIGH: Unstable     Decision Making/ Complexity Score: low         Goals:  STG (3 Weeks)  Pt will be independent with Initial home exercise program in order to facilitate Physical Therapy treatment  Pt will reduce worst pain to 4/10 in order to perform ADLs  Pt will have obtained a SIJ belt to reduce abnormal joint motion.     LTG(6weeks)  Pt will be independent c final home exercise program in order to DC to home program  Pt will improve FOTO limitation to TBD% indicative of overall improvement in function   Pt will improve worst pain to 1/10 in order to return to previous level of function       Plan     Plan of care Certification: 12/18/2024 to 2/18/\2025.    Outpatient Physical Therapy 1 times weekly for 6 weeks to include the following interventions: Gait Training, Manual Therapy, Moist Heat/ Ice, Neuromuscular Re-ed,  Patient Education, Therapeutic Activities, and Therapeutic Exercise.     Danielle Kemp, PT, DPT,DPT, OCS

## 2024-12-24 ENCOUNTER — PATIENT MESSAGE (OUTPATIENT)
Dept: OTHER | Facility: OTHER | Age: 34
End: 2024-12-24
Payer: MEDICAID

## 2024-12-26 ENCOUNTER — PROCEDURE VISIT (OUTPATIENT)
Dept: MATERNAL FETAL MEDICINE | Facility: CLINIC | Age: 34
End: 2024-12-26
Payer: MEDICAID

## 2024-12-26 ENCOUNTER — LAB VISIT (OUTPATIENT)
Dept: LAB | Facility: OTHER | Age: 34
End: 2024-12-26
Attending: STUDENT IN AN ORGANIZED HEALTH CARE EDUCATION/TRAINING PROGRAM
Payer: MEDICAID

## 2024-12-26 ENCOUNTER — ROUTINE PRENATAL (OUTPATIENT)
Dept: OBSTETRICS AND GYNECOLOGY | Facility: CLINIC | Age: 34
End: 2024-12-26
Payer: MEDICAID

## 2024-12-26 VITALS
DIASTOLIC BLOOD PRESSURE: 70 MMHG | SYSTOLIC BLOOD PRESSURE: 110 MMHG | BODY MASS INDEX: 40.49 KG/M2 | WEIGHT: 250.88 LBS

## 2024-12-26 DIAGNOSIS — Z3A.32 32 WEEKS GESTATION OF PREGNANCY: Primary | ICD-10-CM

## 2024-12-26 DIAGNOSIS — Z34.03 NORMAL FIRST PREGNANCY CONFIRMED, CURRENTLY IN THIRD TRIMESTER: ICD-10-CM

## 2024-12-26 DIAGNOSIS — R63.8 INCREASED BMI: ICD-10-CM

## 2024-12-26 LAB
BASOPHILS # BLD AUTO: 0.01 K/UL (ref 0–0.2)
BASOPHILS NFR BLD: 0.1 % (ref 0–1.9)
DIFFERENTIAL METHOD BLD: ABNORMAL
EOSINOPHIL # BLD AUTO: 0.1 K/UL (ref 0–0.5)
EOSINOPHIL NFR BLD: 0.6 % (ref 0–8)
ERYTHROCYTE [DISTWIDTH] IN BLOOD BY AUTOMATED COUNT: 13.2 % (ref 11.5–14.5)
HCT VFR BLD AUTO: 33.9 % (ref 37–48.5)
HGB BLD-MCNC: 10.8 G/DL (ref 12–16)
HIV 1+2 AB+HIV1 P24 AG SERPL QL IA: NEGATIVE
IMM GRANULOCYTES # BLD AUTO: 0.04 K/UL (ref 0–0.04)
IMM GRANULOCYTES NFR BLD AUTO: 0.5 % (ref 0–0.5)
LYMPHOCYTES # BLD AUTO: 2 K/UL (ref 1–4.8)
LYMPHOCYTES NFR BLD: 23.4 % (ref 18–48)
MCH RBC QN AUTO: 30.2 PG (ref 27–31)
MCHC RBC AUTO-ENTMCNC: 31.9 G/DL (ref 32–36)
MCV RBC AUTO: 95 FL (ref 82–98)
MONOCYTES # BLD AUTO: 0.3 K/UL (ref 0.3–1)
MONOCYTES NFR BLD: 3.2 % (ref 4–15)
NEUTROPHILS # BLD AUTO: 6.3 K/UL (ref 1.8–7.7)
NEUTROPHILS NFR BLD: 72.2 % (ref 38–73)
NRBC BLD-RTO: 0 /100 WBC
PLATELET # BLD AUTO: 291 K/UL (ref 150–450)
PMV BLD AUTO: 9.9 FL (ref 9.2–12.9)
RBC # BLD AUTO: 3.58 M/UL (ref 4–5.4)
TREPONEMA PALLIDUM IGG+IGM AB [PRESENCE] IN SERUM OR PLASMA BY IMMUNOASSAY: NONREACTIVE
WBC # BLD AUTO: 8.7 K/UL (ref 3.9–12.7)

## 2024-12-26 PROCEDURE — 99213 OFFICE O/P EST LOW 20 MIN: CPT | Mod: PBBFAC,TH | Performed by: FAMILY MEDICINE

## 2024-12-26 PROCEDURE — 87389 HIV-1 AG W/HIV-1&-2 AB AG IA: CPT | Performed by: STUDENT IN AN ORGANIZED HEALTH CARE EDUCATION/TRAINING PROGRAM

## 2024-12-26 PROCEDURE — 99999 PR PBB SHADOW E&M-EST. PATIENT-LVL III: CPT | Mod: PBBFAC,,, | Performed by: FAMILY MEDICINE

## 2024-12-26 PROCEDURE — 85025 COMPLETE CBC W/AUTO DIFF WBC: CPT | Performed by: STUDENT IN AN ORGANIZED HEALTH CARE EDUCATION/TRAINING PROGRAM

## 2024-12-26 PROCEDURE — 86593 SYPHILIS TEST NON-TREP QUANT: CPT | Performed by: STUDENT IN AN ORGANIZED HEALTH CARE EDUCATION/TRAINING PROGRAM

## 2024-12-26 PROCEDURE — 76816 OB US FOLLOW-UP PER FETUS: CPT | Mod: PBBFAC | Performed by: OBSTETRICS & GYNECOLOGY

## 2024-12-26 PROCEDURE — 36415 COLL VENOUS BLD VENIPUNCTURE: CPT | Performed by: STUDENT IN AN ORGANIZED HEALTH CARE EDUCATION/TRAINING PROGRAM

## 2024-12-26 NOTE — PROGRESS NOTES
Reason for visit: Routine Prenatal Visit      HPI:   34 y.o., at 32w2d by Estimated Date of Delivery: 25    3t labs and growth scan today  Unsure about rsv vaccine still thinking about it    - Contractions: none  - Bleeding: none  - Loss of fluid: none  - Fetal movement: present discussed FMC  - Nausea: none  - Vomiting: none  - Headache: mild int asx with them resolve on their own, bp normal today      Reviewed:    Past medical, surgical, social, family, and obstetric history: Reviewed and updated in EMR.  Medications: Reviewed and updated in EMR.  Allergies: Ceftriaxone and Percocet [oxycodone-acetaminophen]    Pregnancy dating, labs, ultrasound reports, prenatal testing, and problem list: Reviewed and updated in EMR.  Outside records: na  Independent interpretation of tests: na  Discussion with another healthcare professional: na      Vitals: /70   Wt 113.8 kg (250 lb 14.1 oz)   LMP 2024 (Exact Date)   BMI 40.49 kg/m²     Physical exam:  GENERAL: No acute distress  ABD: Gravid      Assessment and Plan:    32 weeks gestation of pregnancy    Normal first pregnancy confirmed, currently in third trimester         labor precautions given  Follow-up: 2 weeks      I spent a total of 20 minutes on the day of the visit. This includes face to face time and non-face to face time preparing to see the patient (eg, review of tests), Obtaining and/or reviewing separately obtained history, Documenting clinical information in the electronic or other health record, Independently interpreting results and communicating results to the patient/family/caregiver, or Care coordination.

## 2024-12-26 NOTE — PATIENT INSTRUCTIONS
[facility] DEL[facility]ERY PHONE NUMBER, [phone] (OPEN 24/7, LOCATED ON 6TH FLOOR OF HOSPITAL)  [address] PHONE NUMBER, [phone] (OPEN MON-FRI, 8a-5p)

## 2024-12-28 ENCOUNTER — TELEPHONE (OUTPATIENT)
Dept: OBSTETRICS AND GYNECOLOGY | Facility: OTHER | Age: 34
End: 2024-12-28
Payer: MEDICAID

## 2025-01-09 ENCOUNTER — ROUTINE PRENATAL (OUTPATIENT)
Dept: OBSTETRICS AND GYNECOLOGY | Facility: CLINIC | Age: 35
End: 2025-01-09
Payer: MEDICAID

## 2025-01-09 VITALS
SYSTOLIC BLOOD PRESSURE: 114 MMHG | BODY MASS INDEX: 40.81 KG/M2 | DIASTOLIC BLOOD PRESSURE: 68 MMHG | WEIGHT: 252.88 LBS

## 2025-01-09 DIAGNOSIS — Z86.19 HISTORY OF POSITIVE PCR FOR HERPES SIMPLEX VIRUS TYPE 2 (HSV-2) DNA: ICD-10-CM

## 2025-01-09 DIAGNOSIS — R63.8 INCREASED BMI: ICD-10-CM

## 2025-01-09 DIAGNOSIS — Z34.03 NORMAL FIRST PREGNANCY CONFIRMED, CURRENTLY IN THIRD TRIMESTER: Primary | ICD-10-CM

## 2025-01-09 PROCEDURE — 99999 PR PBB SHADOW E&M-EST. PATIENT-LVL II: CPT | Mod: PBBFAC,,, | Performed by: STUDENT IN AN ORGANIZED HEALTH CARE EDUCATION/TRAINING PROGRAM

## 2025-01-09 PROCEDURE — 99212 OFFICE O/P EST SF 10 MIN: CPT | Mod: S$PBB,TH,, | Performed by: STUDENT IN AN ORGANIZED HEALTH CARE EDUCATION/TRAINING PROGRAM

## 2025-01-09 PROCEDURE — 99212 OFFICE O/P EST SF 10 MIN: CPT | Mod: PBBFAC,TH,PN | Performed by: STUDENT IN AN ORGANIZED HEALTH CARE EDUCATION/TRAINING PROGRAM

## 2025-01-09 RX ORDER — VALACYCLOVIR HYDROCHLORIDE 500 MG/1
500 TABLET, FILM COATED ORAL 2 TIMES DAILY
Qty: 60 TABLET | Refills: 0 | Status: SHIPPED | OUTPATIENT
Start: 2025-01-09 | End: 2025-02-08

## 2025-01-09 RX ORDER — FAMOTIDINE 20 MG/1
20 TABLET, FILM COATED ORAL 2 TIMES DAILY
Qty: 60 TABLET | Refills: 0 | Status: SHIPPED | OUTPATIENT
Start: 2025-01-09

## 2025-01-09 RX ORDER — FERROUS SULFATE 325(65) MG
325 TABLET, DELAYED RELEASE (ENTERIC COATED) ORAL
Qty: 90 TABLET | Refills: 0 | Status: SHIPPED | OUTPATIENT
Start: 2025-01-09

## 2025-01-09 NOTE — PROGRESS NOTES
Pregnancy dating, labs, ultrasound reports, prenatal testing, and problem list; prior records and results; and available outside records were reviewed and updated in EMR.  Pertinent findings were noted below.    Reason for Visit   Routine Prenatal Visit    HPI   34 y.o., at 34w2d by Estimated Date of Delivery: 25    Here for CHRISTOPHER. Having a lot of heartburn. Has also been craving ice, baby powder. Has not been eating the baby powder but smelling it or smelling laundry detergent and eating a popsicle. Not taking iron, just PNV.  No contractions, vaginal bleeding, leakage of fluid. Reports normal fetal movement.      Exam   /68   Wt 114.7 kg (252 lb 13.9 oz)   LMP 2024 (Exact Date)   BMI 40.81 kg/m²     TW pounds    GENERAL: No acute distress  ABD: Gravid    Assessment and Plan   Normal first pregnancy confirmed, currently in third trimester    History of positive PCR for herpes simplex virus type 2 (HSV-2) DNA  -     valACYclovir (VALTREX) 500 MG tablet; Take 1 tablet (500 mg total) by mouth 2 (two) times daily.  Dispense: 60 tablet; Refill: 0    Increased BMI    Other orders  -     ferrous sulfate 325 (65 FE) MG EC tablet; Take 1 tablet (325 mg total) by mouth 3 (three) times daily with meals.  Dispense: 90 tablet; Refill: 0  -     famotidine (PEPCID) 20 MG tablet; Take 1 tablet (20 mg total) by mouth 2 (two) times daily.  Dispense: 60 tablet; Refill: 0      -- Last H/H 10.8/33.9. Recommend iron supplementation. Rx sent.  -- Pepcid for heartburn.  -- Start valtrex at 36 weeks.  -- Still considering RSV vaccine.  -- RTC in 2 weeks. GBS swab at next visit.      Ellen Han MD

## 2025-01-14 ENCOUNTER — PATIENT MESSAGE (OUTPATIENT)
Dept: OTHER | Facility: OTHER | Age: 35
End: 2025-01-14
Payer: MEDICAID

## 2025-01-28 ENCOUNTER — ROUTINE PRENATAL (OUTPATIENT)
Dept: OBSTETRICS AND GYNECOLOGY | Facility: CLINIC | Age: 35
End: 2025-01-28
Payer: MEDICAID

## 2025-01-28 VITALS
BODY MASS INDEX: 41.49 KG/M2 | DIASTOLIC BLOOD PRESSURE: 80 MMHG | WEIGHT: 257.06 LBS | SYSTOLIC BLOOD PRESSURE: 122 MMHG

## 2025-01-28 DIAGNOSIS — Z34.00 SUPERVISION OF NORMAL FIRST PREGNANCY, ANTEPARTUM: Primary | ICD-10-CM

## 2025-01-28 PROBLEM — N91.2 AMENORRHEA: Status: RESOLVED | Noted: 2024-07-10 | Resolved: 2025-01-28

## 2025-01-28 PROCEDURE — 87081 CULTURE SCREEN ONLY: CPT | Performed by: ADVANCED PRACTICE MIDWIFE

## 2025-01-28 PROCEDURE — 99999 PR PBB SHADOW E&M-EST. PATIENT-LVL II: CPT | Mod: PBBFAC,,, | Performed by: ADVANCED PRACTICE MIDWIFE

## 2025-01-28 PROCEDURE — 99212 OFFICE O/P EST SF 10 MIN: CPT | Mod: PBBFAC,TH | Performed by: ADVANCED PRACTICE MIDWIFE

## 2025-01-28 NOTE — PROGRESS NOTES
Reason for visit: Routine Prenatal Visit      HPI:   34 y.o., at 37w0d by Estimated Date of Delivery: 2/18/25    Pt presents for routine OB visit. Patient reports she is feeling well and heartburn still persists.    - Contractions: No  - Bleeding: No  - Loss of fluid: No  - Fetal movement: Yes  - Nausea: Yes  - Vomiting: No  - Headache: No      Reviewed:    Past medical, surgical, social, family, and obstetric history: Reviewed and updated in EMR.  Medications: Reviewed and updated in EMR.  Allergies: Ceftriaxone and Percocet [oxycodone-acetaminophen]    Pregnancy dating, labs, ultrasound reports, prenatal testing, and problem list: Reviewed and updated in EMR.  Outside records: N/A  Independent interpretation of tests: N/A  Discussion with another healthcare professional: N/A      Vitals: /80   Wt 116.6 kg (257 lb 0.9 oz)   LMP 05/13/2024 (Exact Date)   BMI 41.49 kg/m²     Physical exam:  GENERAL: No acute distress  ABD: Gravid      Assessment and Plan:    Supervision of normal first pregnancy, antepartum        GBS collected  Consents done  Pt reports she is taking her daily valtrex  Discussed heartburn, pt is taking her pepcid  Discussed options of IOL, pt undecided at this time, advised to discuss with Dr. Han at next visit    Follow-up: 1 week      I spent a total of 20 minutes on the day of the visit. This includes face to face time and non-face to face time preparing to see the patient (eg, review of tests), Obtaining and/or reviewing separately obtained history, Documenting clinical information in the electronic or other health record, Independently interpreting results and communicating results to the patient/family/caregiver, or Care coordination.       LEESA Salazar    I have seen the patient, reviewed the  PA student  assessment, plan, and progress note. I have personally interviewed and examined the patient at bedside and: agree with the findings.. Date of Service:  01/28/2025 10:03  AM      Tahira Knapp, HENRY  Obstetrics

## 2025-01-31 LAB — BACTERIA SPEC AEROBE CULT: NORMAL

## 2025-02-02 ENCOUNTER — HOSPITAL ENCOUNTER (EMERGENCY)
Facility: OTHER | Age: 35
Discharge: HOME | End: 2025-02-02
Attending: STUDENT IN AN ORGANIZED HEALTH CARE EDUCATION/TRAINING PROGRAM
Payer: MEDICAID

## 2025-02-02 VITALS
OXYGEN SATURATION: 98 % | DIASTOLIC BLOOD PRESSURE: 91 MMHG | SYSTOLIC BLOOD PRESSURE: 137 MMHG | RESPIRATION RATE: 18 BRPM | TEMPERATURE: 98 F | HEART RATE: 81 BPM

## 2025-02-02 DIAGNOSIS — Z3A.37 37 WEEKS GESTATION OF PREGNANCY: Primary | ICD-10-CM

## 2025-02-02 DIAGNOSIS — R03.0 ELEVATED BLOOD PRESSURE READING: ICD-10-CM

## 2025-02-02 DIAGNOSIS — O47.9 UTERINE CONTRACTIONS DURING PREGNANCY: ICD-10-CM

## 2025-02-02 DIAGNOSIS — R60.9 SWELLING: ICD-10-CM

## 2025-02-02 LAB
ALBUMIN SERPL BCP-MCNC: 2.9 G/DL (ref 3.5–5.2)
ALP SERPL-CCNC: 119 U/L (ref 40–150)
ALT SERPL W/O P-5'-P-CCNC: 14 U/L (ref 10–44)
ANION GAP SERPL CALC-SCNC: 10 MMOL/L (ref 8–16)
AST SERPL-CCNC: 13 U/L (ref 10–40)
BASOPHILS # BLD AUTO: 0.01 K/UL (ref 0–0.2)
BASOPHILS NFR BLD: 0.1 % (ref 0–1.9)
BILIRUB SERPL-MCNC: 0.3 MG/DL (ref 0.1–1)
BUN SERPL-MCNC: 6 MG/DL (ref 6–20)
CALCIUM SERPL-MCNC: 9.1 MG/DL (ref 8.7–10.5)
CHLORIDE SERPL-SCNC: 110 MMOL/L (ref 95–110)
CO2 SERPL-SCNC: 17 MMOL/L (ref 23–29)
CREAT SERPL-MCNC: 0.7 MG/DL (ref 0.5–1.4)
CREAT UR-MCNC: 221.8 MG/DL (ref 15–325)
DIFFERENTIAL METHOD BLD: ABNORMAL
EOSINOPHIL # BLD AUTO: 0 K/UL (ref 0–0.5)
EOSINOPHIL NFR BLD: 0.5 % (ref 0–8)
ERYTHROCYTE [DISTWIDTH] IN BLOOD BY AUTOMATED COUNT: 13.8 % (ref 11.5–14.5)
EST. GFR  (NO RACE VARIABLE): >60 ML/MIN/1.73 M^2
GLUCOSE SERPL-MCNC: 99 MG/DL (ref 70–110)
HCT VFR BLD AUTO: 32.1 % (ref 37–48.5)
HGB BLD-MCNC: 10.4 G/DL (ref 12–16)
IMM GRANULOCYTES # BLD AUTO: 0.01 K/UL (ref 0–0.04)
IMM GRANULOCYTES NFR BLD AUTO: 0.1 % (ref 0–0.5)
LYMPHOCYTES # BLD AUTO: 1.9 K/UL (ref 1–4.8)
LYMPHOCYTES NFR BLD: 23.4 % (ref 18–48)
MCH RBC QN AUTO: 29.7 PG (ref 27–31)
MCHC RBC AUTO-ENTMCNC: 32.4 G/DL (ref 32–36)
MCV RBC AUTO: 92 FL (ref 82–98)
MONOCYTES # BLD AUTO: 0.3 K/UL (ref 0.3–1)
MONOCYTES NFR BLD: 4.3 % (ref 4–15)
NEUTROPHILS # BLD AUTO: 5.6 K/UL (ref 1.8–7.7)
NEUTROPHILS NFR BLD: 71.6 % (ref 38–73)
NRBC BLD-RTO: 0 /100 WBC
PLATELET # BLD AUTO: 360 K/UL (ref 150–450)
PMV BLD AUTO: 9.9 FL (ref 9.2–12.9)
POTASSIUM SERPL-SCNC: 3.7 MMOL/L (ref 3.5–5.1)
PROT SERPL-MCNC: 6.8 G/DL (ref 6–8.4)
PROT UR-MCNC: 32 MG/DL (ref 0–15)
PROT/CREAT UR: 0.14 MG/G{CREAT} (ref 0–0.2)
RBC # BLD AUTO: 3.5 M/UL (ref 4–5.4)
SODIUM SERPL-SCNC: 137 MMOL/L (ref 136–145)
WBC # BLD AUTO: 7.89 K/UL (ref 3.9–12.7)

## 2025-02-02 PROCEDURE — 25000003 PHARM REV CODE 250

## 2025-02-02 PROCEDURE — 59025 FETAL NON-STRESS TEST: CPT

## 2025-02-02 PROCEDURE — 80053 COMPREHEN METABOLIC PANEL: CPT

## 2025-02-02 PROCEDURE — 82570 ASSAY OF URINE CREATININE: CPT

## 2025-02-02 PROCEDURE — 59025 FETAL NON-STRESS TEST: CPT | Mod: 26,,, | Performed by: STUDENT IN AN ORGANIZED HEALTH CARE EDUCATION/TRAINING PROGRAM

## 2025-02-02 PROCEDURE — 85025 COMPLETE CBC W/AUTO DIFF WBC: CPT

## 2025-02-02 PROCEDURE — 99284 EMERGENCY DEPT VISIT MOD MDM: CPT | Mod: 25,,, | Performed by: STUDENT IN AN ORGANIZED HEALTH CARE EDUCATION/TRAINING PROGRAM

## 2025-02-02 PROCEDURE — 99284 EMERGENCY DEPT VISIT MOD MDM: CPT | Mod: 25

## 2025-02-02 RX ORDER — ACETAMINOPHEN 500 MG
1000 TABLET ORAL EVERY 6 HOURS PRN
Status: DISCONTINUED | OUTPATIENT
Start: 2025-02-02 | End: 2025-02-02 | Stop reason: HOSPADM

## 2025-02-02 RX ADMIN — ACETAMINOPHEN 1000 MG: 500 TABLET, FILM COATED ORAL at 11:02

## 2025-02-02 NOTE — ED PROVIDER NOTES
Encounter Date: 2025       History     Chief Complaint   Patient presents with    Contractions     Purvi Ann is a 34 y.o.  at 37w5d who presents to the OB ED today (2025) with CC of contractions for 1 hour.    Patient reports she was at [employer] when she began to contract for the first time. She notes having 5-6 contractions that are intense and painful in the past hour. She reports no vaginal bleeding and no leakage of fluid. She reports good fetal movement.    Additionally she notes lower extremity swelling. The swelling in worse in the right than the left. No pain in lower extremities,     She also notes SOB throughout pregnancy, no recent increase in SOB.     This pregnancy is complicated by HSV and Obesity.          Review of patient's allergies indicates:   Allergen Reactions    Ceftriaxone Edema     Tolerates penicillin with no issues    Percocet [oxycodone-acetaminophen]      Past Medical History:   Diagnosis Date    Allergy     HSV anogenital infection     Recurrent upper respiratory infection (URI)      Past Surgical History:   Procedure Laterality Date    NO PAST SURGERIES       Family History   Problem Relation Name Age of Onset    Colon cancer Father      Cancer Father  46        colon ca    Diabetes Mother      Hypertension Mother      Breast cancer Neg Hx      Eclampsia Neg Hx      Miscarriages / Stillbirths Neg Hx      Ovarian cancer Neg Hx       labor Neg Hx      Stroke Neg Hx      Cervical cancer Neg Hx      Vaginal cancer Neg Hx      Endometrial cancer Neg Hx       Social History     Tobacco Use    Smoking status: Never    Smokeless tobacco: Never   Substance Use Topics    Alcohol use: Not Currently     Comment: social    Drug use: No     Review of Systems   Constitutional:  Negative for fever.   HENT:  Negative for sore throat.    Respiratory:  Positive for shortness of breath.    Cardiovascular:  Positive for leg swelling. Negative for chest pain.    Gastrointestinal:  Positive for abdominal pain. Negative for diarrhea, nausea and vomiting.   Genitourinary:  Negative for dysuria, frequency, hematuria, urgency, vaginal bleeding and vaginal discharge.   Musculoskeletal:  Negative for back pain.   Skin:  Negative for rash.   Neurological:  Negative for weakness and headaches.   Hematological:  Does not bruise/bleed easily.       Physical Exam     Initial Vitals [02/02/25 1110]   BP Pulse Resp Temp SpO2   (!) 137/91 81 18 98.1 °F (36.7 °C) 98 %      MAP       --         Physical Exam    Vitals reviewed.  Constitutional: She appears well-developed and well-nourished.   HENT:   Head: Normocephalic and atraumatic.   Eyes: EOM are normal.   Neck:   Normal range of motion.  Cardiovascular:  Normal rate.           Pulmonary/Chest: No respiratory distress.   Abdominal: Abdomen is soft. There is no abdominal tenderness.   Musculoskeletal:         General: Normal range of motion.      Cervical back: Normal range of motion.     Neurological: She is alert and oriented to person, place, and time.   Trace edema at lower extremities. R>L. No pain with flexion or calf squeeze bilaterally. Sensation symmetric and intact.    Skin: Skin is warm and dry.   Psychiatric: She has a normal mood and affect.         ED Course   Obtain Fetal nonstress test (NST)    Date/Time: 2/2/2025 12:37 PM    Performed by: Sienna Carlos MD  Authorized by: Sienna Carlos MD    Nonstress Test:     Variability:  6-25 BPM    Decelerations:  None    Accelerations:  15 bpm    Baseline:  120    Contractions:  Not present  Biophysical Profile:     Nonstress Test Interpretation: reactive      Overall Impression:  Reassuring    Labs Reviewed   COMPREHENSIVE METABOLIC PANEL - Abnormal       Result Value    Sodium 137      Potassium 3.7      Chloride 110      CO2 17 (*)     Glucose 99      BUN 6      Creatinine 0.7      Calcium 9.1      Total Protein 6.8      Albumin 2.9 (*)     Total Bilirubin 0.3       Alkaline Phosphatase 119      AST 13      ALT 14      eGFR >60      Anion Gap 10     CBC W/ AUTO DIFFERENTIAL - Abnormal    WBC 7.89      RBC 3.50 (*)     Hemoglobin 10.4 (*)     Hematocrit 32.1 (*)     MCV 92      MCH 29.7      MCHC 32.4      RDW 13.8      Platelets 360      MPV 9.9      Immature Granulocytes 0.1      Gran # (ANC) 5.6      Immature Grans (Abs) 0.01      Lymph # 1.9      Mono # 0.3      Eos # 0.0      Baso # 0.01      nRBC 0      Gran % 71.6      Lymph % 23.4      Mono % 4.3      Eosinophil % 0.5      Basophil % 0.1      Differential Method Automated     PROTEIN / CREATININE RATIO, URINE - Abnormal    Protein, Urine Random 32 (*)     Creatinine, Urine 221.8      Prot/Creat Ratio, Urine 0.14      Narrative:     Specimen Source->Urine          Imaging Results              US Lower Extremity Veins Right (Final result)  Result time 02/02/25 12:08:25      Final result by Shaheed Raman MD (02/02/25 12:08:25)                   Impression:      No evidence of deep venous thrombosis in the right lower extremity.    Right lower extremity subcutaneous edema.      Electronically signed by: Shaheed Raman MD  Date:    02/02/2025  Time:    12:08               Narrative:    EXAMINATION:  US LOWER EXTREMITY VEINS RIGHT    CLINICAL HISTORY:  Edema, unspecified    TECHNIQUE:  Duplex and color flow Doppler evaluation and graded compression of the right lower extremity veins was performed.    COMPARISON:  09/28/2023    FINDINGS:  Duplex and color flow Doppler evaluation does not reveal any evidence of acute venous thrombosis in the common femoral, superficial femoral, greater saphenous, popliteal, peroneal, anterior tibial and posterior tibial veins of the right lower extremity.  There is no reflux to suggest valvular incompetence.  There is right lower extremity subcutaneous edema.                                       Medications - No data to display  Medical Decision Making  Purvi Ann is a  34 y.o.  at 37w5d who presents to the OB ED today (2025) with CC of contractions.    Temp:  [98.1 °F (36.7 °C)] 98.1 °F (36.7 °C)  Pulse:  [81] 81  Resp:  [18] 18  SpO2:  [98 %] 98 %  BP: (137)/(91) 137/91      Contractions  - 5-6 contractions x 1 hour, no VB/LoF/Good FM  - cl/th/hi @ 1100  - [place]: quiet  - NST: 120 mod ren, +accels, -decels  - Patient notes contractions subsided over 1 hour in KEKE, no recheck completed      Lower Extremity Swelling   - Trace edema bilaterally, R>L  - No pain, sensation intact  - RLE US > No evidence of deep venous thrombosis in the right lower extremity. Right lower extremity subcutaneous edema.  - Suspect physiologic edema of pregnancy at this time      Elevated BP  - First pressure in KEKE 137/91, followed by mild-range pressures  - Pre-E labs plts 360 AST/ALT 13/14 Cr 0.7 PC Ratio 0.14  - Per chart review, no other elevated pressures  - Has follow up blood pressure check in clinic on   - Patient stable for discharge at this time  - ED return precautions given  - She voiced understanding and is in agreement with the plan    Sienna Carlos MD  OBGYN   PGY-1        Amount and/or Complexity of Data Reviewed  Labs: ordered.    Risk  OTC drugs.              Attending Attestation:   Physician Attestation Statement for Resident:  As the supervising MD   Physician Attestation Statement: I have personally seen and examined this patient.   I agree with the above history.  -:   As the supervising MD I agree with the above PE.     As the supervising MD I agree with the above treatment, course, plan, and disposition.   I was personally present during the critical portions of the procedure(s) performed by the resident and was immediately available in the ED to provide services and assistance as needed during the entire procedure.  I have reviewed and agree with the residents interpretation of the following: lab data.  I have reviewed the following: old records at this  facility.                      I agree with the above edited resident note. Pt seen and examined, chart and labs reviewed.    Briefly, 35 YO  at 37w5d presenting with contractions. SVE unchanged cl/th/hi. Noted to have low mild range BP, PreE labs wnl. Given no prior history of elevated BP and no readings >4 hours apart, she was discharged home with strict instructions to check BP twice daily and return for any BP >140/90. She has follow up early this week with Dr. Han.     All questions answered. Stable for d/c home with outpatient follow up.    Yolanda Thacker MD   [facility] Hospitalist                   Clinical Impression:  Final diagnoses:  [Z3A.37] 37 weeks gestation of pregnancy (Primary)  [O47.9] Uterine contractions during pregnancy  [R03.0] Elevated blood pressure reading          ED Disposition Condition    Discharge Stable          ED Prescriptions    None       Follow-up Information    None          Sienna Carlos MD  Resident  [phone]       Yolanda Thacker MD  [phone]

## 2025-02-02 NOTE — DISCHARGE INSTRUCTIONS
Contact your primary OB or after hours at [phone] if you experience any of the following:    Contractions every 3-5 minutes for 2 or more hours.   A sudden gush or constant leaking of fluid.  Heavy vaginal bleeding.   If you're not feeling your baby move as much or not at all.  If you experience a constant headache, blurry vision, pain underneath your right rib, or sudden swelling of your hands, feet, and face.     Remember to stay hydrated; drink 8-10 bottles of water a day.     Maintain all follow-up appointments.

## 2025-02-04 ENCOUNTER — TELEPHONE (OUTPATIENT)
Dept: OBSTETRICS AND GYNECOLOGY | Facility: CLINIC | Age: 35
End: 2025-02-04

## 2025-02-04 ENCOUNTER — ROUTINE PRENATAL (OUTPATIENT)
Dept: OBSTETRICS AND GYNECOLOGY | Facility: CLINIC | Age: 35
End: 2025-02-04
Payer: MEDICAID

## 2025-02-04 VITALS — BODY MASS INDEX: 40.92 KG/M2 | DIASTOLIC BLOOD PRESSURE: 86 MMHG | WEIGHT: 253.5 LBS | SYSTOLIC BLOOD PRESSURE: 136 MMHG

## 2025-02-04 DIAGNOSIS — Z34.00 SUPERVISION OF NORMAL FIRST PREGNANCY, ANTEPARTUM: Primary | ICD-10-CM

## 2025-02-04 PROCEDURE — 99212 OFFICE O/P EST SF 10 MIN: CPT | Mod: TH,S$PBB,, | Performed by: NURSE PRACTITIONER

## 2025-02-04 PROCEDURE — 99999 PR PBB SHADOW E&M-EST. PATIENT-LVL III: CPT | Mod: PBBFAC,,, | Performed by: NURSE PRACTITIONER

## 2025-02-04 PROCEDURE — 99213 OFFICE O/P EST LOW 20 MIN: CPT | Mod: PBBFAC,TH,PN | Performed by: NURSE PRACTITIONER

## 2025-02-04 NOTE — PROGRESS NOTES
Here for routine OB appt at 38w0d, with complaints of DOLL yesterday.  Denies any vision changes or RUQ pain. She is having LUQ discomfort.   Reports good FM.  Denies LOF, denies VB, denies contractions.  Reviewed warning signs of Labor and Preeclampsia.  Daily FM counts reinforced.  Peds- [facility]. Plans to breastfeed.   She desires induction at 39 weeks. Will coordinate scheduling with Dr. Han.

## 2025-02-05 RX ORDER — FAMOTIDINE 20 MG/1
20 TABLET, FILM COATED ORAL 2 TIMES DAILY
Qty: 180 TABLET | Refills: 2 | Status: SHIPPED | OUTPATIENT
Start: 2025-02-05

## 2025-02-05 NOTE — TELEPHONE ENCOUNTER
Refill Routing Note   Medication(s) are not appropriate for processing by [facility] for the following reason(s):        Outside of protocol-current pregnancy    [facility] action(s):  Route               Appointments  past 12m or future 3m with PCP    Date Provider   Last Visit   1/9/2025 Ellen Han MD   Next Visit   Visit date not found Ellen Han MD   ED visits in past 90 days: 1        Note composed:10:09 AM 02/05/2025

## 2025-02-11 ENCOUNTER — PATIENT MESSAGE (OUTPATIENT)
Dept: OBSTETRICS AND GYNECOLOGY | Facility: OTHER | Age: 35
End: 2025-02-11
Payer: MEDICAID

## 2025-02-12 ENCOUNTER — ANESTHESIA (OUTPATIENT)
Dept: OBSTETRICS AND GYNECOLOGY | Facility: OTHER | Age: 35
End: 2025-02-12
Payer: MEDICAID

## 2025-02-12 ENCOUNTER — ANESTHESIA EVENT (OUTPATIENT)
Dept: OBSTETRICS AND GYNECOLOGY | Facility: OTHER | Age: 35
End: 2025-02-12
Payer: MEDICAID

## 2025-02-12 ENCOUNTER — HOSPITAL ENCOUNTER (INPATIENT)
Facility: OTHER | Age: 35
LOS: 4 days | Discharge: HOME | End: 2025-02-16
Attending: STUDENT IN AN ORGANIZED HEALTH CARE EDUCATION/TRAINING PROGRAM | Admitting: STUDENT IN AN ORGANIZED HEALTH CARE EDUCATION/TRAINING PROGRAM
Payer: MEDICAID

## 2025-02-12 DIAGNOSIS — Z34.90 ENCOUNTER FOR INDUCTION OF LABOR: ICD-10-CM

## 2025-02-12 DIAGNOSIS — Z98.891 STATUS POST PRIMARY LOW TRANSVERSE CESAREAN SECTION: Primary | ICD-10-CM

## 2025-02-12 PROBLEM — O99.019 ANEMIA DURING PREGNANCY: Status: ACTIVE | Noted: 2025-02-12

## 2025-02-12 PROBLEM — O99.210 OBESITY AFFECTING PREGNANCY, ANTEPARTUM: Status: ACTIVE | Noted: 2025-02-12

## 2025-02-12 LAB
ABO + RH BLD: NORMAL
BASOPHILS # BLD AUTO: 0.01 K/UL (ref 0–0.2)
BASOPHILS NFR BLD: 0.1 % (ref 0–1.9)
BLD GP AB SCN CELLS X3 SERPL QL: NORMAL
DIFFERENTIAL METHOD BLD: ABNORMAL
EOSINOPHIL # BLD AUTO: 0.1 K/UL (ref 0–0.5)
EOSINOPHIL NFR BLD: 0.7 % (ref 0–8)
ERYTHROCYTE [DISTWIDTH] IN BLOOD BY AUTOMATED COUNT: 14.2 % (ref 11.5–14.5)
HCT VFR BLD AUTO: 32.4 % (ref 37–48.5)
HGB BLD-MCNC: 10.6 G/DL (ref 12–16)
IMM GRANULOCYTES # BLD AUTO: 0.03 K/UL (ref 0–0.04)
IMM GRANULOCYTES NFR BLD AUTO: 0.3 % (ref 0–0.5)
LYMPHOCYTES # BLD AUTO: 2.1 K/UL (ref 1–4.8)
LYMPHOCYTES NFR BLD: 21.6 % (ref 18–48)
MCH RBC QN AUTO: 30.1 PG (ref 27–31)
MCHC RBC AUTO-ENTMCNC: 32.7 G/DL (ref 32–36)
MCV RBC AUTO: 92 FL (ref 82–98)
MONOCYTES # BLD AUTO: 0.5 K/UL (ref 0.3–1)
MONOCYTES NFR BLD: 4.7 % (ref 4–15)
NEUTROPHILS # BLD AUTO: 7 K/UL (ref 1.8–7.7)
NEUTROPHILS NFR BLD: 72.6 % (ref 38–73)
NRBC BLD-RTO: 0 /100 WBC
PLATELET # BLD AUTO: 326 K/UL (ref 150–450)
PMV BLD AUTO: 9.9 FL (ref 9.2–12.9)
RBC # BLD AUTO: 3.52 M/UL (ref 4–5.4)
TREPONEMA PALLIDUM IGG+IGM AB [PRESENCE] IN SERUM OR PLASMA BY IMMUNOASSAY: NONREACTIVE
WBC # BLD AUTO: 9.65 K/UL (ref 3.9–12.7)

## 2025-02-12 PROCEDURE — 25000003 PHARM REV CODE 250: Performed by: STUDENT IN AN ORGANIZED HEALTH CARE EDUCATION/TRAINING PROGRAM

## 2025-02-12 PROCEDURE — 27000181 HC CABLE, IUPC

## 2025-02-12 PROCEDURE — 27200710 HC EPIDURAL INFUSION PUMP SET: Performed by: ANESTHESIOLOGY

## 2025-02-12 PROCEDURE — 63600175 PHARM REV CODE 636 W HCPCS: Performed by: STUDENT IN AN ORGANIZED HEALTH CARE EDUCATION/TRAINING PROGRAM

## 2025-02-12 PROCEDURE — 62326 NJX INTERLAMINAR LMBR/SAC: CPT

## 2025-02-12 PROCEDURE — 72100002 HC LABOR CARE, 1ST 8 HOURS

## 2025-02-12 PROCEDURE — 85025 COMPLETE CBC W/AUTO DIFF WBC: CPT

## 2025-02-12 PROCEDURE — 63600175 PHARM REV CODE 636 W HCPCS

## 2025-02-12 PROCEDURE — 51702 INSERT TEMP BLADDER CATH: CPT

## 2025-02-12 PROCEDURE — 25000003 PHARM REV CODE 250

## 2025-02-12 PROCEDURE — 72100003 HC LABOR CARE, EA. ADDL. 8 HRS

## 2025-02-12 PROCEDURE — 4A1HXCZ MONITORING OF PRODUCTS OF CONCEPTION, CARDIAC RATE, EXTERNAL APPROACH: ICD-10-PCS | Performed by: OBSTETRICS & GYNECOLOGY

## 2025-02-12 PROCEDURE — 86593 SYPHILIS TEST NON-TREP QUANT: CPT

## 2025-02-12 PROCEDURE — C1751 CATH, INF, PER/CENT/MIDLINE: HCPCS | Performed by: ANESTHESIOLOGY

## 2025-02-12 PROCEDURE — 11000001 HC ACUTE MED/SURG PRIVATE ROOM

## 2025-02-12 PROCEDURE — 86900 BLOOD TYPING SEROLOGIC ABO: CPT

## 2025-02-12 RX ORDER — OXYTOCIN-SODIUM CHLORIDE 0.9% IV SOLN 30 UNIT/500ML 30-0.9/5 UT/ML-%
10 SOLUTION INTRAVENOUS ONCE AS NEEDED
Status: DISCONTINUED | OUTPATIENT
Start: 2025-02-12 | End: 2025-02-13

## 2025-02-12 RX ORDER — SODIUM CHLORIDE 9 MG/ML
INJECTION, SOLUTION INTRAVENOUS
Status: DISCONTINUED | OUTPATIENT
Start: 2025-02-12 | End: 2025-02-13

## 2025-02-12 RX ORDER — OXYTOCIN-SODIUM CHLORIDE 0.9% IV SOLN 30 UNIT/500ML 30-0.9/5 UT/ML-%
0-32 SOLUTION INTRAVENOUS CONTINUOUS
Status: DISCONTINUED | OUTPATIENT
Start: 2025-02-12 | End: 2025-02-13

## 2025-02-12 RX ORDER — PROCHLORPERAZINE EDISYLATE 5 MG/ML
5 INJECTION INTRAMUSCULAR; INTRAVENOUS ONCE
Status: COMPLETED | OUTPATIENT
Start: 2025-02-12 | End: 2025-02-12

## 2025-02-12 RX ORDER — FENTANYL/BUPIVACAINE/NS/PF 2MCG/ML-.1
PLASTIC BAG, INJECTION (ML) INJECTION
Status: DISCONTINUED | OUTPATIENT
Start: 2025-02-12 | End: 2025-02-13

## 2025-02-12 RX ORDER — OXYTOCIN-SODIUM CHLORIDE 0.9% IV SOLN 30 UNIT/500ML 30-0.9/5 UT/ML-%
95 SOLUTION INTRAVENOUS CONTINUOUS PRN
Status: DISCONTINUED | OUTPATIENT
Start: 2025-02-12 | End: 2025-02-13

## 2025-02-12 RX ORDER — MISOPROSTOL 200 UG/1
800 TABLET ORAL ONCE AS NEEDED
Status: DISCONTINUED | OUTPATIENT
Start: 2025-02-12 | End: 2025-02-13

## 2025-02-12 RX ORDER — BUPIVACAINE HYDROCHLORIDE 2.5 MG/ML
INJECTION, SOLUTION EPIDURAL; INFILTRATION; INTRACAUDAL
Status: DISPENSED
Start: 2025-02-12 | End: 2025-02-13

## 2025-02-12 RX ORDER — TRANEXAMIC ACID 10 MG/ML
1000 INJECTION, SOLUTION INTRAVENOUS EVERY 30 MIN PRN
Status: DISCONTINUED | OUTPATIENT
Start: 2025-02-12 | End: 2025-02-13

## 2025-02-12 RX ORDER — CALCIUM CARBONATE 200(500)MG
500 TABLET,CHEWABLE ORAL 3 TIMES DAILY PRN
Status: DISCONTINUED | OUTPATIENT
Start: 2025-02-12 | End: 2025-02-13

## 2025-02-12 RX ORDER — OXYTOCIN 10 [USP'U]/ML
10 INJECTION, SOLUTION INTRAMUSCULAR; INTRAVENOUS ONCE AS NEEDED
Status: DISCONTINUED | OUTPATIENT
Start: 2025-02-12 | End: 2025-02-13

## 2025-02-12 RX ORDER — ONDANSETRON 8 MG/1
8 TABLET, ORALLY DISINTEGRATING ORAL EVERY 8 HOURS PRN
Status: DISCONTINUED | OUTPATIENT
Start: 2025-02-12 | End: 2025-02-13

## 2025-02-12 RX ORDER — DIPHENOXYLATE HYDROCHLORIDE AND ATROPINE SULFATE 2.5; .025 MG/1; MG/1
2 TABLET ORAL EVERY 6 HOURS PRN
Status: DISCONTINUED | OUTPATIENT
Start: 2025-02-12 | End: 2025-02-13

## 2025-02-12 RX ORDER — OXYTOCIN-SODIUM CHLORIDE 0.9% IV SOLN 30 UNIT/500ML 30-0.9/5 UT/ML-%
95 SOLUTION INTRAVENOUS ONCE AS NEEDED
Status: DISCONTINUED | OUTPATIENT
Start: 2025-02-12 | End: 2025-02-13

## 2025-02-12 RX ORDER — SODIUM CHLORIDE, SODIUM LACTATE, POTASSIUM CHLORIDE, CALCIUM CHLORIDE 600; 310; 30; 20 MG/100ML; MG/100ML; MG/100ML; MG/100ML
INJECTION, SOLUTION INTRAVENOUS CONTINUOUS
Status: DISCONTINUED | OUTPATIENT
Start: 2025-02-12 | End: 2025-02-13

## 2025-02-12 RX ORDER — LIDOCAINE HYDROCHLORIDE 10 MG/ML
10 INJECTION, SOLUTION INFILTRATION; PERINEURAL ONCE AS NEEDED
Status: DISCONTINUED | OUTPATIENT
Start: 2025-02-12 | End: 2025-02-13

## 2025-02-12 RX ORDER — METHYLERGONOVINE MALEATE 0.2 MG/ML
200 INJECTION INTRAVENOUS ONCE AS NEEDED
Status: DISCONTINUED | OUTPATIENT
Start: 2025-02-12 | End: 2025-02-13

## 2025-02-12 RX ORDER — CHLOROPROCAINE HYDROCHLORIDE 20 MG/ML
INJECTION, SOLUTION EPIDURAL; INFILTRATION; INTRACAUDAL; PERINEURAL
Status: DISCONTINUED | OUTPATIENT
Start: 2025-02-12 | End: 2025-02-13

## 2025-02-12 RX ORDER — TERBUTALINE SULFATE 1 MG/ML
INJECTION SUBCUTANEOUS
Status: COMPLETED
Start: 2025-02-12 | End: 2025-02-12

## 2025-02-12 RX ORDER — SIMETHICONE 80 MG
1 TABLET,CHEWABLE ORAL 4 TIMES DAILY PRN
Status: DISCONTINUED | OUTPATIENT
Start: 2025-02-12 | End: 2025-02-13

## 2025-02-12 RX ORDER — LIDOCAINE HYDROCHLORIDE AND EPINEPHRINE 15; 5 MG/ML; UG/ML
INJECTION, SOLUTION EPIDURAL
Status: DISCONTINUED | OUTPATIENT
Start: 2025-02-12 | End: 2025-02-13

## 2025-02-12 RX ORDER — CHLOROPROCAINE HYDROCHLORIDE 20 MG/ML
INJECTION, SOLUTION EPIDURAL; INFILTRATION; INTRACAUDAL; PERINEURAL
Status: DISCONTINUED | OUTPATIENT
Start: 2025-02-12 | End: 2025-02-12

## 2025-02-12 RX ORDER — SODIUM CHLORIDE 0.9 % (FLUSH) 0.9 %
2 SYRINGE (ML) INJECTION
Status: DISCONTINUED | OUTPATIENT
Start: 2025-02-12 | End: 2025-02-13

## 2025-02-12 RX ORDER — CEFAZOLIN 2 G/1
2 INJECTION, POWDER, FOR SOLUTION INTRAMUSCULAR; INTRAVENOUS ONCE AS NEEDED
Status: DISCONTINUED | OUTPATIENT
Start: 2025-02-12 | End: 2025-02-13

## 2025-02-12 RX ORDER — CARBOPROST TROMETHAMINE 250 UG/ML
250 INJECTION, SOLUTION INTRAMUSCULAR
Status: DISCONTINUED | OUTPATIENT
Start: 2025-02-12 | End: 2025-02-13

## 2025-02-12 RX ADMIN — BUPIVACAINE HYDROCHLORIDE 8 ML: 2.5 INJECTION, SOLUTION EPIDURAL; INFILTRATION; INTRACAUDAL; PERINEURAL at 04:02

## 2025-02-12 RX ADMIN — PROCHLORPERAZINE EDISYLATE 5 MG: 5 INJECTION INTRAMUSCULAR; INTRAVENOUS at 08:02

## 2025-02-12 RX ADMIN — TERBUTALINE SULFATE 0.25 MG: 1 INJECTION SUBCUTANEOUS at 02:02

## 2025-02-12 RX ADMIN — FENTANYL CITRATE 8 ML/HR: 50 INJECTION INTRAMUSCULAR; INTRAVENOUS at 10:02

## 2025-02-12 RX ADMIN — Medication 4 MILLI-UNITS/MIN: at 12:02

## 2025-02-12 RX ADMIN — ONDANSETRON 8 MG: 8 TABLET, ORALLY DISINTEGRATING ORAL at 02:02

## 2025-02-12 RX ADMIN — CHLOROPROCAINE HYDROCHLORIDE 5 ML: 20 INJECTION, SOLUTION EPIDURAL; INFILTRATION; INTRACAUDAL; PERINEURAL at 02:02

## 2025-02-12 RX ADMIN — SODIUM CHLORIDE, POTASSIUM CHLORIDE, SODIUM LACTATE AND CALCIUM CHLORIDE 1000 ML: 600; 310; 30; 20 INJECTION, SOLUTION INTRAVENOUS at 10:02

## 2025-02-12 RX ADMIN — FENTANYL CITRATE 3 ML: 50 INJECTION INTRAMUSCULAR; INTRAVENOUS at 10:02

## 2025-02-12 RX ADMIN — LIDOCAINE HYDROCHLORIDE,EPINEPHRINE BITARTRATE 2 ML: 15; .005 INJECTION, SOLUTION EPIDURAL; INFILTRATION; INTRACAUDAL; PERINEURAL at 10:02

## 2025-02-12 RX ADMIN — MISOPROSTOL 50 MCG: 100 TABLET ORAL at 05:02

## 2025-02-12 NOTE — ANESTHESIA PROCEDURE NOTES
CSE    Patient location during procedure: OB  Start time: 2/12/2025 10:29 AM  Timeout: 2/12/2025 10:25 AM  End time: 2/12/2025 10:42 AM    Reason for block: labor analgesia requested by patient and obstetrician    Staffing  Authorizing Provider: Ayanna Cruz MD  Performing Provider: Blaise Franklin MD    Staffing  Performed by: Blaise Franklin MD  Authorized by: Ayanna Cruz MD    Preanesthetic Checklist  Completed: patient identified, IV checked, site marked, risks and benefits discussed, surgical consent, monitors and equipment checked, pre-op evaluation and timeout performed  CSE  Patient position: sitting  Prep: ChloraPrep  Patient monitoring: frequent blood pressure checks and continuous pulse ox  Approach: midline  Spinal Needle  Needle type: Quincke   Needle gauge: 25 G  Needle length: 3.5 in  Epidural Needle  Injection technique: ALLIE air  Needle type: Tuohy   Needle gauge: 17 G  Needle length: 3.5 in  Needle insertion depth: 8.5 cm  Location: L3-4  Needle localization: anatomical landmarks   Catheter  Catheter type: Qwaq  Catheter size: 18 G  Catheter at skin depth: 13 cm  Test dose: lidocaine 1.5% with Epi 1-to-200,000 and negative  Test dose: 2 mL  Additional Documentation: incremental injection, negative aspiration for CSF, negative aspiration for heme and no paresthesia on injection

## 2025-02-12 NOTE — PROGRESS NOTES
"LABOR NOTE (Late entry from 0900)    S:  Complaints: No.  Epidural Working:  not applicable    MD to bedside for routine cervical check     O: /72   Pulse 83   Temp 98.2 °F (36.8 °C)   Resp 17   Ht 5' 6" (1.676 m)   Wt 115 kg (253 lb 8.5 oz)   LMP 05/13/2024 (Exact Date)   SpO2 100%   Breastfeeding No   BMI 40.92 kg/m²     FHT: 125 bpm, mod ren, +accels Cat 1 (reassuring)  CTX: difficult to interpret on monitor, irregular appearing  SVE: 0.5/60/-4    TIMELINE:   0900: 0.5/60/-4, [facility] in place, s/p cytotec (0600)    PLAN:      Continue Close Maternal/Fetal Monitoring  Patient desires epidural, anesthesia advised will recheck once comfortable. Plan if unchanged for second dose of vaginal cytotec  Recheck 4 hours or PRN    Ramandeep Haywood MD  [facility]   OBGYN PGY-1        "

## 2025-02-12 NOTE — H&P
HISTORY AND PHYSICAL                                                OBSTETRICS          Subjective:       Purvi Ann is a 34 y.o.  female with IUP at 39w1d weeks gestation who presents for scheduled IOL.    Patient denies contractions, denies vaginal bleeding, denies LOF.   Fetal Movement: normal.    This IUP is complicated by Obesity (ppBMI 40), h/o HSV (neg spec), and Anemia.    Review of Systems   Constitutional:  Negative for chills and fever.   Respiratory:  Negative for shortness of breath.    Cardiovascular:  Negative for chest pain.   Gastrointestinal:  Negative for abdominal pain, diarrhea and vomiting.   Genitourinary:  Negative for vaginal bleeding and vaginal discharge.   Neurological:  Negative for headaches.   Psychiatric/Behavioral:  Negative for depression. The patient is not nervous/anxious.        PMHx:   Past Medical History:   Diagnosis Date    Allergy     HSV anogenital infection     Recurrent upper respiratory infection (URI)        PSHx:   Past Surgical History:   Procedure Laterality Date    NO PAST SURGERIES         All:   Review of patient's allergies indicates:   Allergen Reactions    Ceftriaxone Edema     Tolerates penicillin with no issues    Percocet [oxycodone-acetaminophen]        Meds:   Facility-Administered Medications Prior to Admission   Medication Dose Route Frequency Provider Last Rate Last Admin    Tdap (BOOSTRIX) vaccine injection 0.5 mL  0.5 mL Intramuscular vaccine x 1 dose          Medications Prior to Admission   Medication Sig Dispense Refill Last Dose/Taking    aspirin (ECOTRIN) 81 MG EC tablet Take 1 tablet (81 mg total) by mouth once daily.       famotidine (PEPCID) 20 MG tablet TAKE 1 TABLET(20 MG) BY MOUTH TWICE DAILY 180 tablet 2     ferrous sulfate 325 (65 FE) MG EC tablet Take 1 tablet (325 mg total) by mouth 3 (three) times daily with meals. 90 tablet 0     guaiFENesin (MUCINEX) 600 mg 12 hr tablet Take 1,200 mg by mouth Daily.        prenatal vit no.124/iron/folic (PRENATAL VITAMIN ORAL) Take 1 Dose by mouth Daily.       valACYclovir (VALTREX) 500 MG tablet Take 1 tablet (500 mg total) by mouth 2 (two) times daily. 60 tablet 0        SH:   Social History     Socioeconomic History    Marital status: Significant Other   Tobacco Use    Smoking status: Never    Smokeless tobacco: Never   Substance and Sexual Activity    Alcohol use: Not Currently     Comment: social    Drug use: No    Sexual activity: Not Currently     Partners: Male     Birth control/protection: None     Social Drivers of Health     Financial Resource Strain: High Risk (3/19/2024)    Overall Financial Resource Strain (CARDIA)     Difficulty of Paying Living Expenses: Very hard   Food Insecurity: Food Insecurity Present (3/19/2024)    Hunger Vital Sign     Worried About Running Out of Food in the Last Year: Sometimes true     Ran Out of Food in the Last Year: Sometimes true   Transportation Needs: No Transportation Needs (3/19/2024)    PRAPARE - Transportation     Lack of Transportation (Medical): No     Lack of Transportation (Non-Medical): No   Physical Activity: Unknown (3/19/2024)    Exercise Vital Sign     Days of Exercise per Week: 5 days   Stress: No Stress Concern Present (3/19/2024)    [employer] - Occupational Stress Questionnaire     Feeling of Stress : Only a little   Housing Stability: High Risk (3/19/2024)    Housing Stability Vital Sign     Unable to Pay for Housing in the Last Year: Yes     Unstable Housing in the Last Year: No       FH:   Family History   Problem Relation Name Age of Onset    Colon cancer Father      Cancer Father  46        colon ca    Diabetes Mother      Hypertension Mother      Breast cancer Neg Hx      Eclampsia Neg Hx      Miscarriages / Stillbirths Neg Hx      Ovarian cancer Neg Hx       labor Neg Hx      Stroke Neg Hx      Cervical cancer Neg Hx      Vaginal cancer Neg Hx      Endometrial cancer Neg Hx  "        OBHx:   OB History    Para Term  AB Living   1 0 0 0 0 0   SAB IAB Ectopic Multiple Live Births   0 0 0 0 0      # Outcome Date GA Lbr [place]/2nd Weight Sex Type Anes PTL Lv   1 Current                Objective:       /75   Pulse 80   Temp 98.2 °F (36.8 °C) (Oral)   Resp 16   Ht 5' 6" (1.676 m)   Wt 115 kg (253 lb 8.5 oz)   LMP 2024 (Exact Date)   SpO2 100%   Breastfeeding No   BMI 40.92 kg/m²     Vitals:    25 0445 25 0450 25 0530   BP: 118/74 118/74 117/75   Pulse: 96 86 80   Resp: 16     Temp: 98.2 °F (36.8 °C)     TempSrc: Oral     SpO2: 100% 100% 100%   Weight: 115 kg (253 lb 8.5 oz)     Height: 5' 6" (1.676 m)         General: NAD, alert, oriented, cooperative  HEENT: NCAT, EOM grossly intact  Lungs: Normal WOB  Heart: regular rate  Abdomen: gravid, soft, nondistended, nontender, no rebound or guarding  Extremities: no edema    FHT: 140 bpm, moderate BTBV, +accels, -decels; Cat 1 (reassuring)  Enhaut: no ctx noted  Presentation: cephalic by ultrasound    Cervix: 0.5/50/-4  SSE: Negative for any lesions     EFW by Leopold's: n/a    Maternal pelvis unproven     Recent Growth Scan: 2092g (EFW 37%) @ 32w2d    Lab Review  Blood Type A POS  GBBS: negative  Rubella: Immune  RPR: negative  HIV: negative  HepB: negative       Assessment:       39w1d weeks gestation with Obesity (ppBMI 40), h/o HSV (neg spec), and Anemia who presents for scheduled IOL.    Active Hospital Problems    Diagnosis  POA    *Encounter for induction of labor [Z34.90]  Not Applicable    Obesity affecting pregnancy, antepartum [O99.210]  Yes    Anemia during pregnancy [O99.019]  Yes    History of herpes genitalis [Z86.19]  Yes      Resolved Hospital Problems   No resolved problems to display.          Plan:     IOL   Risks, benefits, alternatives and possible complications have been discussed in detail with the patient.   - Consents signed and to chart  - Admit to Labor and Delivery unit  - " Induction agent: cervical huynh balloon and cytotec  - Epidural per Anesthesia  - Draw CBC, T&S  - Notify Staff  - Recheck in 4 hrs or PRN  - Post-Partum Hemorrhage risk - medium  - Postpartum lovenox: yes given ppBMI >40.    2. Obesity  - PrePreg BMI 40  - Encourage ambulation  - Lovenox: 40 mg BID    3. Anemia   - H/H as above  - asymptomatic  - iron/colace postpartum        Saritha Houston MD PGY2  Obstetrics and Gynecology

## 2025-02-12 NOTE — ED NOTES
34 y.o.  39w1d by 8wk US      Dx: IOL, Obesity (ppBMI 40), h/o HSV (neg spec), Anemia    PPHRisk: medium  SVE: 0.5/60/-4 @ [id number]   Mem: I  US: vtx  Kenn: **  Meds: Solis (0530), Cytotec (0600)   BC: ASK  Labs:A POS/RI/HIVnr/Trepnr/GBBSneg      [ ] OB - Guille  [ ] Labs  [ ] Boy- NEEDS circ consents  [x] Consents

## 2025-02-12 NOTE — ANESTHESIA PREPROCEDURE EVALUATION
[facility]  Anesthesia Pre-Operative Evaluation         Patient Name: Purvi Ann  YOB: 1990  MRN: [id number]    2025      Purvi Ann is a 34 y.o. female  @ 39w1d who presents scheduled IOL. IUP c/b HSV and morbid obesity (BMI 41)    Patient denies cardiopulmonary disease, bleeding disorders, or spine pathology.     OB History    Para Term  AB Living   1 0 0 0 0 0   SAB IAB Ectopic Multiple Live Births   0 0 0 0        # Outcome Date GA Lbr [place]/2nd Weight Sex Type Anes PTL Lv   1 Current                Review of patient's allergies indicates:   Allergen Reactions    Ceftriaxone Edema     Tolerates penicillin with no issues    Percocet [oxycodone-acetaminophen]        Wt Readings from Last 1 Encounters:   25 0857 115 kg (253 lb 8.5 oz)       BP Readings from Last 3 Encounters:   25 136/86   25 (!) 137/91   25 122/80       Patient Active Problem List   Diagnosis    Chronic non-seasonal allergic rhinitis    Allergic conjunctivitis, bilateral    Increased BMI    Constipation    Refused influenza vaccine    Weight gain    Adjustment disorder with depressed mood    Caregiver stress    Stress at work    Normal first pregnancy confirmed, currently in second trimester    History of herpes genitalis    Abnormal posture    Decreased strength    Acute right-sided low back pain with right-sided sciatica    Encounter for induction of labor       Past Surgical History:   Procedure Laterality Date    NO PAST SURGERIES         Social History     Socioeconomic History    Marital status: Significant Other   Tobacco Use    Smoking status: Never    Smokeless tobacco: Never   Substance and Sexual Activity    Alcohol use: Not Currently     Comment: social    Drug use: No    Sexual activity: Not Currently     Partners: Male     Birth control/protection: None     Social Drivers of Health     Financial Resource Strain: High  "Risk (3/19/2024)    Overall Financial Resource Strain (CARDIA)     Difficulty of Paying Living Expenses: Very hard   Food Insecurity: Food Insecurity Present (3/19/2024)    Hunger Vital Sign     Worried About Running Out of Food in the Last Year: Sometimes true     Ran Out of Food in the Last Year: Sometimes true   Transportation Needs: No Transportation Needs (3/19/2024)    PRAPARE - Transportation     Lack of Transportation (Medical): No     Lack of Transportation (Non-Medical): No   Physical Activity: Unknown (3/19/2024)    Exercise Vital Sign     Days of Exercise per Week: 5 days   Stress: No Stress Concern Present (3/19/2024)    [employer] [place] [employer] - Occupational Stress Questionnaire     Feeling of Stress : Only a little   Housing Stability: High Risk (3/19/2024)    Housing Stability Vital Sign     Unable to Pay for Housing in the Last Year: Yes     Unstable Housing in the Last Year: No         Chemistry        Component Value Date/Time     02/02/2025 1125    K 3.7 02/02/2025 1125     02/02/2025 1125    CO2 17 (L) 02/02/2025 1125    BUN 6 02/02/2025 1125    CREATININE 0.7 02/02/2025 1125    GLU 99 02/02/2025 1125        Component Value Date/Time    CALCIUM 9.1 02/02/2025 1125    ALKPHOS 119 02/02/2025 1125    AST 13 02/02/2025 1125    ALT 14 02/02/2025 1125    BILITOT 0.3 02/02/2025 1125    ESTGFRAFRICA >60 05/02/2022 1023    EGFRNONAA >60 05/02/2022 1023            Lab Results   Component Value Date    WBC 7.89 02/02/2025    HGB 10.4 (L) 02/02/2025    HCT 32.1 (L) 02/02/2025    MCV 92 02/02/2025     02/02/2025       No results for input(s): "PT", "INR", "PROTIME", "APTT" in the last 72 hours.            Pre-op Assessment    I have reviewed the Patient Summary Reports.       I have reviewed the Medications.     Review of Systems  Anesthesia Hx:  No problems with previous Anesthesia   Neg history of prior surgery.          Denies Family Hx of Anesthesia complications.    " Denies Personal Hx of Anesthesia complications.                    Social:  Non-Smoker       Hematology/Oncology:  Hematology Normal   Oncology Normal                                   Cardiovascular:  Cardiovascular Normal                                              Pulmonary:  Pulmonary Normal                       Renal/:  Renal/ Normal                 Hepatic/GI:  Hepatic/GI Normal                    Musculoskeletal:  Musculoskeletal Normal                Neurological:  Neurology Normal                                      Endocrine:  Endocrine Normal            Psych:  Psychiatric Normal                    Physical Exam  General: Well nourished, Cooperative, Alert and Oriented    Airway:  Mallampati: II   Mouth Opening: Normal  TM Distance: Normal  Neck ROM: Normal ROM    Dental:  Intact    Chest/Lungs:  Normal Respiratory Rate, Clear to auscultation    Heart:  Rate: Normal  Rhythm: Regular Rhythm        Anesthesia Plan  Type of Anesthesia, risks & benefits discussed:    Anesthesia Type: Gen ETT, Epidural, Spinal, CSE  Intra-op Monitoring Plan: Standard ASA Monitors  Post Op Pain Control Plan: multimodal analgesia, epidural analgesia and intrathecal opioid  Induction:  IV  Airway Plan: Video, Post-Induction  Informed Consent: Informed consent signed with the Patient and all parties understand the risks and agree with anesthesia plan.  All questions answered. Patient consented to blood products? Yes  ASA Score: 3  Day of Surgery Review of History & Physical: H&P Update referred to the surgeon/provider.    Ready For Surgery From Anesthesia Perspective.     .

## 2025-02-12 NOTE — CARE UPDATE
MD to bedside for prolonged deceleration. Patient s/p AROM with scant fluid, 4/60/-2. Pitocin (4) paused and fluid bolus ran. FSE and IUPC placed, noted uterine tachysystole and abdomen firm on exam. Called for terbutatline, given at 1450. Deceleration x6 min decision made to roll to OR to reassess FHT. In OR FHT improved to 110s and abdomen softened on exam. Dr Han at bedside, discussed proceeding with IOL vs CS. Patient would like to wait and reassess proceeding with IOL. Discussed if recurrent prolonged deceleration remote from delivery would recommend CS, patient in agreement with plan.     Pat Bach MD  PGY 4  Obstetrics and Gynecology

## 2025-02-12 NOTE — PROGRESS NOTES
"LABOR NOTE    S:  Complaints: No.  Epidural Working:  Yes    Resident to bedside for routine cervical check     O: /66   Pulse 81   Temp 98.2 °F (36.8 °C)   Resp 17   Ht 5' 6" (1.676 m)   Wt 115 kg (253 lb 8.5 oz)   LMP 05/13/2024 (Exact Date)   SpO2 100%   Breastfeeding No   BMI 40.92 kg/m²     FHT: 120 bpm, mod ren, +accels/-decels Cat 1 (reassuring)  CTX: 2-3 minutes, will initiate pitocin at this time  SVE: 4/60/-3    TIMELINE:   0900: 0.5/60/-4, Solis in place, s/p cytotec (0600)  1215: 4/60/-3; will initiate pitocin at this time    PLAN:      Continue Close Maternal/Fetal Monitoring  Pitocin initiated at this time  Recheck 2 hours for possible AROM or PRN    Kourtney Bowles MD  Obstetrics and Gynecology - PGY2         "

## 2025-02-12 NOTE — PROGRESS NOTES
02/12/25 1452   [facility] Note - Strip   Strip Reviewed by [facility] Nurse? Yes   [facility] Note - Communication   [place] Nurse Communicated with Bedside Nurse Regarding: Fetal Status   [facility] Note - Notification   Nurse Notified? Yes   Name of Nurse Kelsey   Doctor Notified? No

## 2025-02-13 PROBLEM — Z98.891 STATUS POST PRIMARY LOW TRANSVERSE CESAREAN SECTION: Status: ACTIVE | Noted: 2025-02-13

## 2025-02-13 PROCEDURE — 25000003 PHARM REV CODE 250

## 2025-02-13 PROCEDURE — 10907ZC DRAINAGE OF AMNIOTIC FLUID, THERAPEUTIC FROM PRODUCTS OF CONCEPTION, VIA NATURAL OR ARTIFICIAL OPENING: ICD-10-PCS | Performed by: OBSTETRICS & GYNECOLOGY

## 2025-02-13 PROCEDURE — 71000033 HC RECOVERY, INTIAL HOUR: Performed by: OBSTETRICS & GYNECOLOGY

## 2025-02-13 PROCEDURE — 36004725 HC OB OR TIME LEV III - EA ADD 15 MIN: Performed by: OBSTETRICS & GYNECOLOGY

## 2025-02-13 PROCEDURE — 37000009 HC ANESTHESIA EA ADD 15 MINS: Performed by: OBSTETRICS & GYNECOLOGY

## 2025-02-13 PROCEDURE — 37000008 HC ANESTHESIA 1ST 15 MINUTES: Performed by: OBSTETRICS & GYNECOLOGY

## 2025-02-13 PROCEDURE — 63600175 PHARM REV CODE 636 W HCPCS

## 2025-02-13 PROCEDURE — 3E0DXGC INTRODUCTION OF OTHER THERAPEUTIC SUBSTANCE INTO MOUTH AND PHARYNX, EXTERNAL APPROACH: ICD-10-PCS | Performed by: OBSTETRICS & GYNECOLOGY

## 2025-02-13 PROCEDURE — 63600175 PHARM REV CODE 636 W HCPCS: Mod: JZ,TB

## 2025-02-13 PROCEDURE — 72100003 HC LABOR CARE, EA. ADDL. 8 HRS

## 2025-02-13 PROCEDURE — 71000039 HC RECOVERY, EACH ADD'L HOUR: Performed by: OBSTETRICS & GYNECOLOGY

## 2025-02-13 PROCEDURE — 11000001 HC ACUTE MED/SURG PRIVATE ROOM

## 2025-02-13 PROCEDURE — 36004724 HC OB OR TIME LEV III - 1ST 15 MIN: Performed by: OBSTETRICS & GYNECOLOGY

## 2025-02-13 RX ORDER — OXYTOCIN 10 [USP'U]/ML
10 INJECTION, SOLUTION INTRAMUSCULAR; INTRAVENOUS ONCE AS NEEDED
Status: DISCONTINUED | OUTPATIENT
Start: 2025-02-13 | End: 2025-02-16 | Stop reason: HOSPADM

## 2025-02-13 RX ORDER — BISACODYL 10 MG/1
10 SUPPOSITORY RECTAL ONCE AS NEEDED
Status: DISCONTINUED | OUTPATIENT
Start: 2025-02-13 | End: 2025-02-16 | Stop reason: HOSPADM

## 2025-02-13 RX ORDER — MISOPROSTOL 200 UG/1
800 TABLET ORAL ONCE AS NEEDED
Status: DISCONTINUED | OUTPATIENT
Start: 2025-02-13 | End: 2025-02-16 | Stop reason: HOSPADM

## 2025-02-13 RX ORDER — IBUPROFEN 600 MG/1
600 TABLET ORAL
Status: DISCONTINUED | OUTPATIENT
Start: 2025-02-14 | End: 2025-02-16 | Stop reason: HOSPADM

## 2025-02-13 RX ORDER — OXYCODONE AND ACETAMINOPHEN 5; 325 MG/1; MG/1
1 TABLET ORAL EVERY 4 HOURS PRN
Status: CANCELLED | OUTPATIENT
Start: 2025-02-13

## 2025-02-13 RX ORDER — TRANEXAMIC ACID 10 MG/ML
1000 INJECTION, SOLUTION INTRAVENOUS EVERY 30 MIN PRN
Status: DISCONTINUED | OUTPATIENT
Start: 2025-02-13 | End: 2025-02-16 | Stop reason: HOSPADM

## 2025-02-13 RX ORDER — OXYTOCIN-SODIUM CHLORIDE 0.9% IV SOLN 30 UNIT/500ML 30-0.9/5 UT/ML-%
95 SOLUTION INTRAVENOUS CONTINUOUS PRN
Status: DISCONTINUED | OUTPATIENT
Start: 2025-02-13 | End: 2025-02-16 | Stop reason: HOSPADM

## 2025-02-13 RX ORDER — ADHESIVE BANDAGE
30 BANDAGE TOPICAL 2 TIMES DAILY PRN
Status: DISCONTINUED | OUTPATIENT
Start: 2025-02-14 | End: 2025-02-16 | Stop reason: HOSPADM

## 2025-02-13 RX ORDER — SODIUM CHLORIDE, SODIUM LACTATE, POTASSIUM CHLORIDE, CALCIUM CHLORIDE 600; 310; 30; 20 MG/100ML; MG/100ML; MG/100ML; MG/100ML
INJECTION, SOLUTION INTRAVENOUS CONTINUOUS PRN
Status: DISCONTINUED | OUTPATIENT
Start: 2025-02-13 | End: 2025-02-13

## 2025-02-13 RX ORDER — FAMOTIDINE 10 MG/ML
20 INJECTION INTRAVENOUS ONCE
Status: COMPLETED | OUTPATIENT
Start: 2025-02-13 | End: 2025-02-13

## 2025-02-13 RX ORDER — DIPHENOXYLATE HYDROCHLORIDE AND ATROPINE SULFATE 2.5; .025 MG/1; MG/1
2 TABLET ORAL EVERY 6 HOURS PRN
Status: DISCONTINUED | OUTPATIENT
Start: 2025-02-13 | End: 2025-02-16 | Stop reason: HOSPADM

## 2025-02-13 RX ORDER — PRENATAL WITH FERROUS FUM AND FOLIC ACID 3080; 920; 120; 400; 22; 1.84; 3; 20; 10; 1; 12; 200; 27; 25; 2 [IU]/1; [IU]/1; MG/1; [IU]/1; MG/1; MG/1; MG/1; MG/1; MG/1; MG/1; UG/1; MG/1; MG/1; MG/1; MG/1
1 TABLET ORAL DAILY
Status: DISCONTINUED | OUTPATIENT
Start: 2025-02-13 | End: 2025-02-16 | Stop reason: HOSPADM

## 2025-02-13 RX ORDER — FENTANYL CITRATE 50 UG/ML
INJECTION, SOLUTION INTRAMUSCULAR; INTRAVENOUS
Status: DISCONTINUED | OUTPATIENT
Start: 2025-02-13 | End: 2025-02-13

## 2025-02-13 RX ORDER — DIPHENHYDRAMINE HCL 25 MG
25 CAPSULE ORAL EVERY 4 HOURS PRN
Status: DISCONTINUED | OUTPATIENT
Start: 2025-02-13 | End: 2025-02-16 | Stop reason: HOSPADM

## 2025-02-13 RX ORDER — KETOROLAC TROMETHAMINE 30 MG/ML
30 INJECTION, SOLUTION INTRAMUSCULAR; INTRAVENOUS EVERY 6 HOURS
Status: COMPLETED | OUTPATIENT
Start: 2025-02-13 | End: 2025-02-14

## 2025-02-13 RX ORDER — SIMETHICONE 80 MG
1 TABLET,CHEWABLE ORAL EVERY 6 HOURS PRN
Status: DISCONTINUED | OUTPATIENT
Start: 2025-02-13 | End: 2025-02-16 | Stop reason: HOSPADM

## 2025-02-13 RX ORDER — METHYLERGONOVINE MALEATE 0.2 MG/ML
200 INJECTION INTRAVENOUS ONCE AS NEEDED
Status: DISCONTINUED | OUTPATIENT
Start: 2025-02-13 | End: 2025-02-16 | Stop reason: HOSPADM

## 2025-02-13 RX ORDER — SODIUM CHLORIDE 0.9 % (FLUSH) 0.9 %
10 SYRINGE (ML) INJECTION
Status: DISCONTINUED | OUTPATIENT
Start: 2025-02-13 | End: 2025-02-16 | Stop reason: HOSPADM

## 2025-02-13 RX ORDER — OXYCODONE AND ACETAMINOPHEN 10; 325 MG/1; MG/1
1 TABLET ORAL EVERY 4 HOURS PRN
Status: CANCELLED | OUTPATIENT
Start: 2025-02-13

## 2025-02-13 RX ORDER — DEXAMETHASONE SODIUM PHOSPHATE 4 MG/ML
INJECTION, SOLUTION INTRA-ARTICULAR; INTRALESIONAL; INTRAMUSCULAR; INTRAVENOUS; SOFT TISSUE
Status: DISCONTINUED | OUTPATIENT
Start: 2025-02-13 | End: 2025-02-13

## 2025-02-13 RX ORDER — FENTANYL/BUPIVACAINE/NS/PF 2MCG/ML-.1
PLASTIC BAG, INJECTION (ML) INJECTION
Status: DISPENSED
Start: 2025-02-13 | End: 2025-02-13

## 2025-02-13 RX ORDER — KETOROLAC TROMETHAMINE 30 MG/ML
INJECTION, SOLUTION INTRAMUSCULAR; INTRAVENOUS
Status: DISCONTINUED | OUTPATIENT
Start: 2025-02-13 | End: 2025-02-13

## 2025-02-13 RX ORDER — LIDOCAINE HYDROCHLORIDE 20 MG/ML
INJECTION, SOLUTION EPIDURAL; INFILTRATION; INTRACAUDAL; PERINEURAL
Status: DISCONTINUED | OUTPATIENT
Start: 2025-02-13 | End: 2025-02-13

## 2025-02-13 RX ORDER — AMOXICILLIN 250 MG
1 CAPSULE ORAL NIGHTLY PRN
Status: DISCONTINUED | OUTPATIENT
Start: 2025-02-13 | End: 2025-02-16 | Stop reason: HOSPADM

## 2025-02-13 RX ORDER — SODIUM CITRATE AND CITRIC ACID MONOHYDRATE 334; 500 MG/5ML; MG/5ML
30 SOLUTION ORAL ONCE
Status: COMPLETED | OUTPATIENT
Start: 2025-02-13 | End: 2025-02-13

## 2025-02-13 RX ORDER — OXYCODONE HYDROCHLORIDE 10 MG/1
10 TABLET ORAL EVERY 6 HOURS PRN
Status: DISCONTINUED | OUTPATIENT
Start: 2025-02-13 | End: 2025-02-16 | Stop reason: HOSPADM

## 2025-02-13 RX ORDER — OXYCODONE HYDROCHLORIDE 5 MG/1
5 TABLET ORAL EVERY 6 HOURS PRN
Status: DISCONTINUED | OUTPATIENT
Start: 2025-02-13 | End: 2025-02-16 | Stop reason: HOSPADM

## 2025-02-13 RX ORDER — OXYTOCIN 10 [USP'U]/ML
INJECTION, SOLUTION INTRAMUSCULAR; INTRAVENOUS
Status: DISCONTINUED | OUTPATIENT
Start: 2025-02-13 | End: 2025-02-13

## 2025-02-13 RX ORDER — CARBOPROST TROMETHAMINE 250 UG/ML
250 INJECTION, SOLUTION INTRAMUSCULAR
Status: DISCONTINUED | OUTPATIENT
Start: 2025-02-13 | End: 2025-02-16 | Stop reason: HOSPADM

## 2025-02-13 RX ORDER — SODIUM BICARBONATE 1 MEQ/ML
SYRINGE (ML) INTRAVENOUS
Status: DISCONTINUED | OUTPATIENT
Start: 2025-02-13 | End: 2025-02-13

## 2025-02-13 RX ORDER — MORPHINE SULFATE 0.5 MG/ML
INJECTION, SOLUTION EPIDURAL; INTRATHECAL; INTRAVENOUS
Status: DISCONTINUED | OUTPATIENT
Start: 2025-02-13 | End: 2025-02-13

## 2025-02-13 RX ORDER — OXYTOCIN-SODIUM CHLORIDE 0.9% IV SOLN 30 UNIT/500ML 30-0.9/5 UT/ML-%
95 SOLUTION INTRAVENOUS ONCE AS NEEDED
Status: DISCONTINUED | OUTPATIENT
Start: 2025-02-13 | End: 2025-02-16 | Stop reason: HOSPADM

## 2025-02-13 RX ORDER — PHENYLEPHRINE HYDROCHLORIDE 10 MG/ML
INJECTION INTRAVENOUS
Status: DISCONTINUED | OUTPATIENT
Start: 2025-02-13 | End: 2025-02-13

## 2025-02-13 RX ORDER — OXYTOCIN-SODIUM CHLORIDE 0.9% IV SOLN 30 UNIT/500ML 30-0.9/5 UT/ML-%
10 SOLUTION INTRAVENOUS ONCE AS NEEDED
Status: DISCONTINUED | OUTPATIENT
Start: 2025-02-13 | End: 2025-02-16 | Stop reason: HOSPADM

## 2025-02-13 RX ORDER — ONDANSETRON 8 MG/1
8 TABLET, ORALLY DISINTEGRATING ORAL EVERY 8 HOURS PRN
Status: DISCONTINUED | OUTPATIENT
Start: 2025-02-13 | End: 2025-02-16 | Stop reason: HOSPADM

## 2025-02-13 RX ORDER — ACETAMINOPHEN 325 MG/1
650 TABLET ORAL EVERY 6 HOURS
Status: DISCONTINUED | OUTPATIENT
Start: 2025-02-13 | End: 2025-02-16 | Stop reason: HOSPADM

## 2025-02-13 RX ORDER — TERBUTALINE SULFATE 1 MG/ML
INJECTION SUBCUTANEOUS
Status: DISPENSED
Start: 2025-02-13 | End: 2025-02-13

## 2025-02-13 RX ORDER — DOCUSATE SODIUM 100 MG/1
200 CAPSULE, LIQUID FILLED ORAL 2 TIMES DAILY
Status: DISCONTINUED | OUTPATIENT
Start: 2025-02-13 | End: 2025-02-16 | Stop reason: HOSPADM

## 2025-02-13 RX ORDER — ONDANSETRON HYDROCHLORIDE 2 MG/ML
INJECTION, SOLUTION INTRAMUSCULAR; INTRAVENOUS
Status: DISCONTINUED | OUTPATIENT
Start: 2025-02-13 | End: 2025-02-13

## 2025-02-13 RX ADMIN — ACETAMINOPHEN 650 MG: 325 TABLET, FILM COATED ORAL at 05:02

## 2025-02-13 RX ADMIN — LIDOCAINE HYDROCHLORIDE 3 ML: 20 INJECTION, SOLUTION EPIDURAL; INFILTRATION; INTRACAUDAL at 06:02

## 2025-02-13 RX ADMIN — SODIUM BICARBONATE 0.3 MEQ: 84 INJECTION, SOLUTION INTRAVENOUS at 06:02

## 2025-02-13 RX ADMIN — DIPHENHYDRAMINE HYDROCHLORIDE 25 MG: 25 CAPSULE ORAL at 01:02

## 2025-02-13 RX ADMIN — FAMOTIDINE 20 MG: 10 INJECTION, SOLUTION INTRAVENOUS at 06:02

## 2025-02-13 RX ADMIN — KETOROLAC TROMETHAMINE 30 MG: 30 INJECTION, SOLUTION INTRAMUSCULAR at 07:02

## 2025-02-13 RX ADMIN — ACETAMINOPHEN 650 MG: 325 TABLET, FILM COATED ORAL at 11:02

## 2025-02-13 RX ADMIN — DIPHENHYDRAMINE HYDROCHLORIDE 25 MG: 25 CAPSULE ORAL at 09:02

## 2025-02-13 RX ADMIN — Medication 100 ML: at 07:02

## 2025-02-13 RX ADMIN — OXYTOCIN 10 UNITS: 10 INJECTION, SOLUTION INTRAMUSCULAR; INTRAVENOUS at 07:02

## 2025-02-13 RX ADMIN — DEXAMETHASONE SODIUM PHOSPHATE 4 MG: 4 INJECTION, SOLUTION INTRA-ARTICULAR; INTRALESIONAL; INTRAMUSCULAR; INTRAVENOUS; SOFT TISSUE at 06:02

## 2025-02-13 RX ADMIN — FENTANYL CITRATE 100 MCG: 50 INJECTION, SOLUTION INTRAMUSCULAR; INTRAVENOUS at 06:02

## 2025-02-13 RX ADMIN — Medication 350 ML: at 07:02

## 2025-02-13 RX ADMIN — AZITHROMYCIN MONOHYDRATE 500 MG: 500 INJECTION, POWDER, LYOPHILIZED, FOR SOLUTION INTRAVENOUS at 06:02

## 2025-02-13 RX ADMIN — KETOROLAC TROMETHAMINE 30 MG: 30 INJECTION, SOLUTION INTRAMUSCULAR; INTRAVENOUS at 11:02

## 2025-02-13 RX ADMIN — ONDANSETRON 4 MG: 2 INJECTION INTRAMUSCULAR; INTRAVENOUS at 06:02

## 2025-02-13 RX ADMIN — LIDOCAINE HYDROCHLORIDE 2 ML: 20 INJECTION, SOLUTION EPIDURAL; INFILTRATION; INTRACAUDAL at 06:02

## 2025-02-13 RX ADMIN — ACETAMINOPHEN 650 MG: 325 TABLET, FILM COATED ORAL at 12:02

## 2025-02-13 RX ADMIN — Medication 50 ML: at 07:02

## 2025-02-13 RX ADMIN — SODIUM CHLORIDE, SODIUM LACTATE, POTASSIUM CHLORIDE, AND CALCIUM CHLORIDE: 600; 310; 30; 20 INJECTION, SOLUTION INTRAVENOUS at 06:02

## 2025-02-13 RX ADMIN — KETOROLAC TROMETHAMINE 30 MG: 30 INJECTION, SOLUTION INTRAMUSCULAR; INTRAVENOUS at 05:02

## 2025-02-13 RX ADMIN — DOCUSATE SODIUM 200 MG: 100 CAPSULE, LIQUID FILLED ORAL at 08:02

## 2025-02-13 RX ADMIN — KETOROLAC TROMETHAMINE 30 MG: 30 INJECTION, SOLUTION INTRAMUSCULAR; INTRAVENOUS at 12:02

## 2025-02-13 RX ADMIN — LIDOCAINE HYDROCHLORIDE 5 ML: 20 INJECTION, SOLUTION EPIDURAL; INFILTRATION; INTRACAUDAL at 06:02

## 2025-02-13 RX ADMIN — SODIUM CITRATE AND CITRIC ACID MONOHYDRATE 30 ML: 500; 334 SOLUTION ORAL at 06:02

## 2025-02-13 RX ADMIN — DEXTROSE 2 G: 50 INJECTION, SOLUTION INTRAVENOUS at 06:02

## 2025-02-13 RX ADMIN — SODIUM BICARBONATE 0.2 MEQ: 84 INJECTION, SOLUTION INTRAVENOUS at 06:02

## 2025-02-13 RX ADMIN — PHENYLEPHRINE HYDROCHLORIDE 100 MCG: 10 INJECTION INTRAVENOUS at 06:02

## 2025-02-13 RX ADMIN — SODIUM BICARBONATE 0.5 MEQ: 84 INJECTION, SOLUTION INTRAVENOUS at 06:02

## 2025-02-13 RX ADMIN — MORPHINE SULFATE 1 MG: 0.5 INJECTION, SOLUTION EPIDURAL; INTRATHECAL; INTRAVENOUS at 07:02

## 2025-02-13 NOTE — TRANSFER OF CARE
"Anesthesia Transfer of Care Note    Patient: Purvi Ann    Procedure(s) Performed: Procedure(s) (LRB):   SECTION (N/A)    Patient location: Labor and Delivery    Anesthesia Type: general    Transport from OR: Transported from OR on room air with adequate spontaneous ventilation    Post pain: adequate analgesia    Post assessment: no apparent anesthetic complications and tolerated procedure well    Post vital signs: stable    Level of consciousness: awake and alert    Nausea/Vomiting: no nausea/vomiting    Complications: none    Transfer of care protocol was followed      Last vitals: Visit Vitals  /70   Pulse 93   Temp 37.1 °C (98.7 °F) (Axillary)   Resp 17   Ht 5' 6" (1.676 m)   Wt 115 kg (253 lb 8.5 oz)   LMP 2024 (Exact Date)   SpO2 98%   Breastfeeding Unknown   BMI 40.92 kg/m²     "

## 2025-02-13 NOTE — PLAN OF CARE
Pt doing well.  Urine output in huynh catheter adequate. Bonding well with infant. Pain well controlled with rx meds.

## 2025-02-13 NOTE — PROGRESS NOTES
"LABOR NOTE    S:  Complaints: No.  Epidural Working:  Yes  Resident to bedside for routine cervical check     O: /76   Pulse 96   Temp 98.4 °F (36.9 °C) (Oral)   Resp 16   Ht 5' 6" (1.676 m)   Wt 115 kg (253 lb 8.5 oz)   LMP 05/13/2024 (Exact Date)   SpO2 98%   Breastfeeding No   BMI 40.92 kg/m²     FHT: 130 bpm, mod variability, +accels/+ intermittent late decels Cat 2 (overall reassuring)  CTX: q 2-3 minutes  SVE: 5/90/-2    TIMELINE:   0900: 0.5/60/-4, Solis in place, s/p cytotec (0600)  1215: 4/60/-3; will initiate pitocin at this time  1430: 4/60/-3, AROM clear  1450: terbutaline given  1500: prolonged decel, pitocin paused  1630: pitocin restarted  1900: 5/70/-2  2345: 5/90/-2, pit at 8 > decreased to 6 due to late decelerations    PLAN:    Continue Close Maternal/Fetal Monitoring  Continue pitocin augmentation per protocol  Recheck 2 hours for possible AROM or PRN    Zahira Altamirano MD  OB/GYN PGY-1        "

## 2025-02-13 NOTE — PLAN OF CARE
VSS. Pt states good pain control with epidural. FSE and IUPC in place for continuous monitoring. Solis draining to gravity. Pt safety maintained. Partner and mother at bedside providing support. POC reviewed with pt and family. All questions answered. Report given to JULIETH Johnson.

## 2025-02-13 NOTE — PLAN OF CARE
Problem: Adult Inpatient Plan of Care  Goal: Plan of Care Review  Outcome: Progressing  Goal: Patient-Specific Goal (Individualized)  Outcome: Progressing  Goal: Absence of Hospital-Acquired Illness or Injury  Outcome: Progressing  Goal: Optimal Comfort and Wellbeing  Outcome: Progressing  Goal: Readiness for Transition of Care  Outcome: Progressing     Problem: Bariatric Environmental Safety  Goal: Safety Maintained with Care  Outcome: Progressing     Problem:  Fall Injury Risk  Goal: Absence of Fall, Infant Drop and Related Injury  Outcome: Progressing     Problem: Infection  Goal: Absence of Infection Signs and Symptoms  Outcome: Progressing     Problem: Labor  Goal: Hemostasis  Outcome: Progressing  Goal: Stable Fetal Wellbeing  Outcome: Progressing  Goal: Effective Progression to Delivery  Outcome: Progressing  Goal: Absence of Infection Signs and Symptoms  Outcome: Progressing  Goal: Acceptable Pain Control  Outcome: Progressing  Goal: Normal Uterine Contraction Pattern  Outcome: Progressing     Problem: Anesthesia/Analgesia, Neuraxial  Goal: Safe, Effective Infusion Delivery  Outcome: Progressing  Goal: Stable Patient-Fetal Status  Outcome: Progressing  Goal: Absence of Infection Signs and Symptoms  Outcome: Progressing  Goal: Nausea and Vomiting Relief  Outcome: Progressing  Goal: Effective Pain Control  Outcome: Progressing  Goal: Effective Oxygenation and Ventilation  Outcome: Progressing  Goal: Baseline Motor Function Return  Outcome: Progressing  Goal: Effective Urinary Elimination  Outcome: Progressing

## 2025-02-13 NOTE — PROGRESS NOTES
"LABOR NOTE    S:  Complaints: No.  Epidural Working:  Yes  Resident to bedside for cervical check in setting of non-reactive strip and patient reporting increased rectal pressure    O: /74   Pulse 96   Temp 99.5 °F (37.5 °C) (Oral)   Resp 17   Ht 5' 6" (1.676 m)   Wt 115 kg (253 lb 8.5 oz)   LMP 05/13/2024 (Exact Date)   SpO2 98%   Breastfeeding No   BMI 40.92 kg/m²     FHT: 120 bpm, min to mod variability, no accels, no decels, reactivity and variability improved with scalp stimulation Cat 1 (reassuring)  CTX: q 2-3 minutes  SVE: 5/90/-2    TIMELINE:   0900: 0.5/60/-4, Solis in place, s/p cytotec (0600)  1215: 4/60/-3; will initiate pitocin at this time  1430: 4/60/-3, AROM clear  1450: terbutaline given  1500: prolonged decel, pitocin paused  1630: pitocin restarted  1900: 5/70/-2  2345: 5/90/-2, pit at 8 > decreased to 6 due to late decelerations  0230: 5/90/-2, pit at 10 > decreased to 8, MVUs 90s  0500: 5/90/-2, pit at 8    PLAN:    Continue Close Maternal/Fetal Monitoring  Continue pitocin augmentation per protocol    Concerns regarding failed IOL and difficulty titrating pitocin 2/2 fetal intolerance previously discussed with patient, see progress note from 2/13 0230 for further information. Patient desires to continue IOL at this time, ok to continue IOL as long as maternal/fetal status remains reassuring. FHT improved, reactive and reassuring following scalp stimulation c/w likely fetal sleep cycle    Zahira Altamirano MD  OB/GYN PGY-1  "

## 2025-02-13 NOTE — NURSING
RN offered to discontinue huynh catheter and to get pt cleaned up and up walking. Pt declined and stated she can't feel parts of her legs. and requested to rest and to discontinue it later.

## 2025-02-13 NOTE — CARE UPDATE
OB team (Dr. Burns, Dr. Sheehan PGY3, myself) to bedside along with several RNs due to prolonged FHT deceleration to 90s for six minutes. Pitocin paused, IV fluid bolus initiated, patient repositioned to hands and knees. Terbutaline called to room due to back to back contractions on monitor but not administered as FHT improved. Patient has been ruptured and on pitocin for 15 hours, contractions have not been adequate per IUPC as there has been difficulty with pitocin uptitration due to fetal intolerance. Discussed failed induction of labor and fetal intolerance of labor remote from delivery. Recommend proceeding with  at this time. Risks, benefits, alternatives discussed. Patient voiced understanding and is agreeable to proceeding with pLTCS. Charge, staff, anesthesia aware. Case request placed. To OR when ready, sooner if FHT again become non-reassuring.    Zahira Altamirano MD  OB/GYN PGY-1

## 2025-02-13 NOTE — L&D DELIVERY NOTE
[facility] - Labor & Delivery   Section   Operative Note    SUMMARY     Date of Procedure: 2025     Procedure: Procedure(s) (LRB):   SECTION (N/A)    Surgeons and Role:     * Deana Burns MD - Primary     * Zahira Altamirano MD - Resident - Assisting    Procedure:   1. Primary  Section via Pfannenstiel skin incision    Indications:   1. Failed induction of labor  2. Fetal intolerance of labor    Pre-operative Diagnosis:   1. IUP at 39w2d pregnancy  2. Obesity (pre-pregnancy BMI 40)  3. HSV (negative speculum exam on admit)  4. Anemia    Post-operative Diagnosis:   Same as above s/p pLTCS    Anesthesia: Epidural/Spinal anesthesia    Findings:    1. Single viable  male infant, with APGARS 8/9, weight 3110g.   2. Normal appearing uterus, ovaries, and fallopian tubes.  3. Deep extension of left angle of hysterotomy.  4. Normal placenta.   5. Excellent hemostasis noted following closure of each tissue layer.    Estimated Blood Loss:  730 mL           Total IV Fluids: See anesthesia report.      UOP: See anesthesia report.    Specimens: Placenta, discarded       Complications:  None; patient tolerated the procedure well.           Disposition: PACU - hemodynamically stable.           Condition: stable    Procedure Details:   The patient was seen in LDR3 where a discussion was had regarding failed induction of labor and fetal intolerance of labor remote from delivery. Please see care update by resident Dr. Altamirano for further details. The risks, benefits, complications, treatment options, and expected outcomes were discussed with the patient.  The patient concurred with the proposed plan, giving informed consent. The patient was taken to operating room, identified as [place] Nikole Ann and the procedure verified as  delivery. A time out was held and the above information confirmed.    Patient arrived to operating room with epidural anesthesia and Solis catheter  already in place. The patient was prepped and draped in the usual sterile manner while placed in a dorsal supine position with a left lateral tilt. Preoperative antibiotics were administered, and an Allis test was performed yielding adequate anesthesia.  A Pfannenstiel incision was made and carried down through the subcutaneous tissue to the fascia. Fascial incision was made and extended transversely. The fascia was grasped with Kocher clamps and  from the underlying rectus tissue superiorly and inferiorly. The peritoneum was identified, found to be free of adherent bowel and entered sharply by elevating the peritoneum using hemostats and scalpel in thin sections. Peritoneal incision was extended longitudinally. The bladder blade was inserted to keep the bladder out of the operative field. A low transverse uterine incision was made with knife and extended with cephalocaudad traction. The amniotic sac was noted to be previously ruptured with clear fluid, and the infant was noted to be in vertex position. The fetal head was brought to the incision and elevated out of the pelvis. The patient delivered a single viable male infant without difficulty.  Infant weighed 3110 grams with Apgar scores of 8/9 at one and five minutes respectively. The umbilical cord was clamped and cut after an intentional one minute delay. The placenta was removed intact and appeared normal and was discarded. The uterus was exteriorized. The uterine outline, tubes and ovaries appeared normal. A deep extension was noted along the left angle of the hysterotomy without extension into the uterine arteries. The extension was repaired in running locked fashion using Chromic suture. An additional figure-of-eight suture was placed along the extension for further hemostasis with excellent hemostasis observed. The uterine incision was then closed with running locked sutures of Chromic. Hemostasis was observed. The abdominal cavity was irrigated.  "The uterus was returned to the abdominal cavity. Incision was reinspected and good hemostasis was noted. The bilateral abdominal gutters were irrigated to remove clots. The muscle was reapproximated with a mattress suture and a single interrupted suture of Chromic. The fascia was then reapproximated with running sutures of PDS. The subcutaneous fat was irrigated and made hemostatic using Bovie cautery. The subcutaneous fat was then reapproximated in running fashion using 2-0 Vicryl. The skin was reapproximated with 4-0 Monocryl. The wound was covered with a silver bandage.     Instrument, sponge, and needle counts were correct prior the abdominal closure and at the conclusion of the case.    Pt tolerated procedure well and was in stable condition after the procedure.    **NOTE: This patient IS a candidate for trial of labor after  delivery**    Zahira Altamirano MD  OB/GYN PGY-1         Specimens:   Specimen (24h ago, onward)      None            VTE Risk Mitigation (From admission, onward)           Ordered     IP VTE HIGH RISK PATIENT  Once         25 0732     Place sequential compression device  Until discontinued         25 0732     Place sequential compression device  Until discontinued         25 0418                       Delivery Information for Elías Ann    Birth information:  YOB: 2024   Time of birth: 6:57 AM   Sex: male   Head Delivery Date/Time: 2025  6:57 AM   Delivery type: , Low Transverse   Gestational Age: 39w2d       Delivery Providers    Delivering clinician: Deana Burns MD   Provider Role    Zahira Altamirano MD Carter, Toki W, RN Toups, Jamie, ST Turner, Teiraney A, RN               Measurements    Weight: 3110 g  Weight (lbs): 6 lb 13.7 oz  Length: 49.5 cm  Length (in): 19.5"  Head circumference: 34.5 cm  Chest circumference: 30.5 cm         Apgars    Living status: Living  Apgar Component Scores:  1 min.:  5 " min.:  10 min.:  15 min.:  20 min.:    Skin color:  0  1       Heart rate:  2  2       Reflex irritability:  2  2       Muscle tone:  2  2       Respiratory effort:  2  2       Total:  8  9                Operative Delivery    Forceps attempted?: No  Vacuum extractor attempted?: No         Shoulder Dystocia    Shoulder dystocia present?: No           Presentation    Presentation: Vertex  Position: Occiput Anterior           Interventions/Resuscitation    Method: Bulb Suctioning, Tactile Stimulation, Deep Suctioning       Cord    Vessels: 3 vessels  Complications: None  Delayed Cord Clamping?: Yes  Cord Clamped Date/Time: 2025  6:58 AM  Cord Blood Disposition: Discarded  Gases Sent?: No  Stem Cell Collection (by MD): No       Placenta    Placenta delivery date/time: 2025 0659  Placenta removal: Manual removal  Placenta appearance: Intact           Labor Events:       labor: No     Labor Onset Date/Time:         Dilation Complete Date/Time:         Start Pushing Date/Time:         Start Pushing Date/Time:       Rupture Date/Time: 25 1436        Rupture type: ARM (Artificial Rupture)        Fluid Amount:       Fluid Color: Clear              steroids: None     Antibiotics given for GBS: No     Induction: balloon dilation (Solis);misoprostol     Indications for induction:  Elective     Augmentation: oxytocin;amniotomy     Indications for augmentation: Ineffective Contraction Pattern     Labor complications:       Additional complications:          Cervical ripenin2025 5:59 AM      Misoprostol          Delivery:      Episiotomy:       Indication for Episiotomy:       Perineal Lacerations:   Repaired:      Periurethral Laceration:   Repaired:     Labial Laceration:   Repaired:     Sulcus Laceration:   Repaired:     Vaginal Laceration:   Repaired:     Cervical Laceration:   Repaired:     Repair suture:       Repair # of packets:       Last Value - EBL - Nursing (mL):       Sum - EBL  - Nursing (mL): 730     Last Value - EBL - Anesthesia (mL): 730     Calculated QBL (mL): 730     Running total QBL (mL): 730     Vaginal Sweep Performed:       Surgicount Correct:       Vaginal Packing:   Quantity:       Other providers:       Anesthesia    Method: Epidural          Details (if applicable):  Trial of Labor Yes    Categorization: Primary    Priority: Urgent   Indications for : Fetal heart rate abnormalities   Incision Type: low transverse     Additional  information:  Forceps:    Vacuum:    Breech:    Observed anomalies    Other (Comments):

## 2025-02-14 PROBLEM — D62 ABLA (ACUTE BLOOD LOSS ANEMIA): Status: ACTIVE | Noted: 2025-02-14

## 2025-02-14 PROBLEM — Z34.90 ENCOUNTER FOR INDUCTION OF LABOR: Status: RESOLVED | Noted: 2025-02-12 | Resolved: 2025-02-14

## 2025-02-14 LAB
BASOPHILS # BLD AUTO: 0.03 K/UL (ref 0–0.2)
BASOPHILS NFR BLD: 0.2 % (ref 0–1.9)
DIFFERENTIAL METHOD BLD: ABNORMAL
EOSINOPHIL # BLD AUTO: 0 K/UL (ref 0–0.5)
EOSINOPHIL NFR BLD: 0.2 % (ref 0–8)
ERYTHROCYTE [DISTWIDTH] IN BLOOD BY AUTOMATED COUNT: 14.6 % (ref 11.5–14.5)
HCT VFR BLD AUTO: 25.9 % (ref 37–48.5)
HGB BLD-MCNC: 8.3 G/DL (ref 12–16)
IMM GRANULOCYTES # BLD AUTO: 0.08 K/UL (ref 0–0.04)
IMM GRANULOCYTES NFR BLD AUTO: 0.5 % (ref 0–0.5)
LYMPHOCYTES # BLD AUTO: 2.2 K/UL (ref 1–4.8)
LYMPHOCYTES NFR BLD: 14.6 % (ref 18–48)
MCH RBC QN AUTO: 29.6 PG (ref 27–31)
MCHC RBC AUTO-ENTMCNC: 32 G/DL (ref 32–36)
MCV RBC AUTO: 93 FL (ref 82–98)
MONOCYTES # BLD AUTO: 0.9 K/UL (ref 0.3–1)
MONOCYTES NFR BLD: 6 % (ref 4–15)
NEUTROPHILS # BLD AUTO: 11.8 K/UL (ref 1.8–7.7)
NEUTROPHILS NFR BLD: 78.5 % (ref 38–73)
NRBC BLD-RTO: 0 /100 WBC
PLATELET # BLD AUTO: 268 K/UL (ref 150–450)
PMV BLD AUTO: 9.8 FL (ref 9.2–12.9)
RBC # BLD AUTO: 2.8 M/UL (ref 4–5.4)
WBC # BLD AUTO: 15 K/UL (ref 3.9–12.7)

## 2025-02-14 PROCEDURE — 11000001 HC ACUTE MED/SURG PRIVATE ROOM

## 2025-02-14 PROCEDURE — 63600175 PHARM REV CODE 636 W HCPCS

## 2025-02-14 PROCEDURE — 25000003 PHARM REV CODE 250

## 2025-02-14 PROCEDURE — 99232 SBSQ HOSP IP/OBS MODERATE 35: CPT | Mod: ,,, | Performed by: OBSTETRICS & GYNECOLOGY

## 2025-02-14 PROCEDURE — 63600175 PHARM REV CODE 636 W HCPCS: Mod: JZ,TB

## 2025-02-14 PROCEDURE — 85025 COMPLETE CBC W/AUTO DIFF WBC: CPT

## 2025-02-14 PROCEDURE — 36415 COLL VENOUS BLD VENIPUNCTURE: CPT

## 2025-02-14 RX ORDER — DOCUSATE SODIUM 100 MG/1
200 CAPSULE, LIQUID FILLED ORAL 2 TIMES DAILY
Qty: 30 CAPSULE | Refills: 1 | Status: SHIPPED | OUTPATIENT
Start: 2025-02-14

## 2025-02-14 RX ORDER — OXYCODONE HYDROCHLORIDE 5 MG/1
5 TABLET ORAL EVERY 4 HOURS PRN
Qty: 15 TABLET | Refills: 0 | Status: SHIPPED | OUTPATIENT
Start: 2025-02-14

## 2025-02-14 RX ORDER — POLYETHYLENE GLYCOL 3350 17 G/17G
17 POWDER, FOR SOLUTION ORAL DAILY
Status: DISCONTINUED | OUTPATIENT
Start: 2025-02-14 | End: 2025-02-16 | Stop reason: HOSPADM

## 2025-02-14 RX ORDER — IBUPROFEN 600 MG/1
600 TABLET ORAL EVERY 6 HOURS
Qty: 30 TABLET | Refills: 1 | Status: SHIPPED | OUTPATIENT
Start: 2025-02-15

## 2025-02-14 RX ORDER — FERROUS SULFATE 325(65) MG
325 TABLET, DELAYED RELEASE (ENTERIC COATED) ORAL DAILY
Qty: 30 TABLET | Refills: 1 | Status: SHIPPED | OUTPATIENT
Start: 2025-02-14 | End: 2025-04-15

## 2025-02-14 RX ORDER — LANOLIN ALCOHOL/MO/W.PET/CERES
1 CREAM (GRAM) TOPICAL DAILY
Status: DISCONTINUED | OUTPATIENT
Start: 2025-02-14 | End: 2025-02-16 | Stop reason: HOSPADM

## 2025-02-14 RX ORDER — ACETAMINOPHEN 325 MG/1
650 TABLET ORAL EVERY 6 HOURS
Qty: 30 TABLET | Refills: 1 | Status: SHIPPED | OUTPATIENT
Start: 2025-02-15

## 2025-02-14 RX ORDER — ENOXAPARIN SODIUM 100 MG/ML
40 INJECTION SUBCUTANEOUS EVERY 12 HOURS
Status: DISCONTINUED | OUTPATIENT
Start: 2025-02-14 | End: 2025-02-16 | Stop reason: HOSPADM

## 2025-02-14 RX ADMIN — ENOXAPARIN SODIUM 40 MG: 40 INJECTION SUBCUTANEOUS at 08:02

## 2025-02-14 RX ADMIN — KETOROLAC TROMETHAMINE 30 MG: 30 INJECTION, SOLUTION INTRAMUSCULAR; INTRAVENOUS at 05:02

## 2025-02-14 RX ADMIN — PRENATAL VIT W/ FE FUMARATE-FA TAB 27-0.8 MG 1 TABLET: 27-0.8 TAB at 08:02

## 2025-02-14 RX ADMIN — ACETAMINOPHEN 650 MG: 325 TABLET, FILM COATED ORAL at 12:02

## 2025-02-14 RX ADMIN — ENOXAPARIN SODIUM 40 MG: 40 INJECTION SUBCUTANEOUS at 09:02

## 2025-02-14 RX ADMIN — IBUPROFEN 600 MG: 600 TABLET ORAL at 11:02

## 2025-02-14 RX ADMIN — ACETAMINOPHEN 650 MG: 325 TABLET, FILM COATED ORAL at 05:02

## 2025-02-14 RX ADMIN — DIPHENHYDRAMINE HYDROCHLORIDE 25 MG: 25 CAPSULE ORAL at 08:02

## 2025-02-14 RX ADMIN — ACETAMINOPHEN 650 MG: 325 TABLET, FILM COATED ORAL at 11:02

## 2025-02-14 RX ADMIN — IBUPROFEN 600 MG: 600 TABLET ORAL at 12:02

## 2025-02-14 RX ADMIN — IBUPROFEN 600 MG: 600 TABLET ORAL at 05:02

## 2025-02-14 RX ADMIN — DOCUSATE SODIUM 200 MG: 100 CAPSULE, LIQUID FILLED ORAL at 09:02

## 2025-02-14 RX ADMIN — FERROUS SULFATE TAB 325 MG (65 MG ELEMENTAL FE) 1 EACH: 325 (65 FE) TAB at 08:02

## 2025-02-14 RX ADMIN — POLYETHYLENE GLYCOL 3350 17 G: 17 POWDER, FOR SOLUTION ORAL at 08:02

## 2025-02-14 RX ADMIN — DOCUSATE SODIUM 200 MG: 100 CAPSULE, LIQUID FILLED ORAL at 08:02

## 2025-02-14 NOTE — ANESTHESIA POSTPROCEDURE EVALUATION
Anesthesia Post Evaluation    Patient: Purvi Ann    Procedure(s) Performed: Procedure(s) (LRB):   SECTION (N/A)    Final Anesthesia Type: CSE      Patient location during evaluation: floor  Patient participation: Yes- Able to Participate  Level of consciousness: awake and alert and oriented  Post-procedure vital signs: reviewed and stable  Pain management: adequate  Airway patency: patent  LUDIN mitigation strategies: Multimodal analgesia  PONV status at discharge: No PONV  Anesthetic complications: no      Cardiovascular status: hemodynamically stable and blood pressure returned to baseline  Respiratory status: unassisted  Hydration status: euvolemic  Follow-up not needed.              Vitals Value Taken Time   /77 25 0856   Temp 36.9 °C (98.5 °F) 25 0856   Pulse 109 25 0856   Resp 18 25 0856   SpO2 96 % 25 0856         Event Time   Out of Recovery 2025 11:25:00         Pain/Renee Score: Pain Rating Prior to Med Admin: 2 (2025  5:40 AM)  Pain Rating Post Med Admin: 0 (2025  5:45 PM)

## 2025-02-14 NOTE — PROGRESS NOTES
"POSTPARTUM PROGRESS NOTE    Subjective:     PPD/POD#: 1   Procedure: Primary LTCS   EGA: 39w2d   N/V: No   F/C: No   Abd Pain: Mild, well-controlled with oral pain medication   Lochia: Mild   Voiding: Yes   Ambulating: Yes   Bowel fnc: No   Contraception: Declines     Patient reports that she is feeling well this AM. She reports that her pain is well controlled with oral pain medications. Patient reports that she has not yet passed flatus and is requested miralax this AM.    Objective:      Temp:  [97.6 °F (36.4 °C)-99.7 °F (37.6 °C)] 97.9 °F (36.6 °C)  Pulse:  [] 100  Resp:  [18-19] 18  SpO2:  [96 %-100 %] 99 %  BP: ()/(54-82) 103/67    Abdomen: Soft, appropriately tender   Uterus: Firm, no fundal tenderness   Incision: Bandage in place without shadowing     Lab Review    No results for input(s): "NA", "K", "CL", "CO2", "BUN", "CREATININE", "GLU", "PROT", "BILITOT", "ALKPHOS", "ALT", "AST", "MG", "PHOS" in the last 168 hours.    Recent Labs   Lab 02/12/25  0444 02/14/25  0503   WBC 9.65 15.00*   HGB 10.6* 8.3*   HCT 32.4* 25.9*   MCV 92 93    268     I/O    Intake/Output Summary (Last 24 hours) at 2/14/2025 0644  Last data filed at 2/14/2025 0200  Gross per 24 hour   Intake 700 ml   Output 2810 ml   Net -2110 ml        Assessment and Plan:   Postpartum care:  - Patient doing well.  - Continue routine management and advances.    Obesity  - PrePreg BMI 40  - Encourage ambulation  - Lovenox: 40 mg BID    Anemia   - H/H as above  - QBL: 730cc  - asymptomatic  - iron/colace    Kourtney Bowles MD  Obstetrics and Gynecology - PGY2   "

## 2025-02-14 NOTE — LACTATION NOTE
Visited patient in room, baby sleeping on back in crib.  Patient stated she did not want to start to breastfeed until tomorrow.  Encouraged patient to call for assistance prn.

## 2025-02-14 NOTE — LACTATION NOTE
02/13/25 1620   Maternal Assessment   Breast Shape Bilateral:;round;wide   Breast Density Left:;soft   Areola Left:;elastic   Nipples Left:;flat;graspable   Maternal Infant Feeding   Maternal Emotional State relaxed;assist needed   Infant Positioning cradle   Signs of Milk Transfer infant jaw motion present   Pain with Feeding no   Comfort Measures Before/During Feeding maternal position adjusted;infant position adjusted   Latch Assistance yes  (moderate assistance providied to position and attach baby to L breast c a deep, comfortable latch)     Visited patient in room, family member holding baby in arms, pacifier slipped out of baby's mouth. L Patient described baby nursing well on the R breast but was unable to latch baby onto L breast, gave the baby the pacifier as he continued to give feeding cues.  Assisted patient to sit more upright in bed.  Blankets removed from baby and diaper changed, baby awake and fussy.  Moderate assistance provided to achieve a deep latch, L breast, cradle position, baby actively sucking, continues to nurse.  Basic education provided, Guide reviewed.  Encouraged to call for assistance prn.

## 2025-02-14 NOTE — PLAN OF CARE
"Plan of care:   Mother will nurse baby on cue " 8 or more in 24" and lengthen feedings until content; will achieve a deep, comfortable latch and observe for signs of milk transfer; will monitor baby's 24hr diaper counts; will call for assistance prn.   "

## 2025-02-15 PROCEDURE — 25000003 PHARM REV CODE 250

## 2025-02-15 PROCEDURE — 11000001 HC ACUTE MED/SURG PRIVATE ROOM

## 2025-02-15 PROCEDURE — 63600175 PHARM REV CODE 636 W HCPCS

## 2025-02-15 RX ORDER — GABAPENTIN 300 MG/1
300 CAPSULE ORAL 3 TIMES DAILY
Status: DISCONTINUED | OUTPATIENT
Start: 2025-02-15 | End: 2025-02-16 | Stop reason: HOSPADM

## 2025-02-15 RX ORDER — LIDOCAINE 50 MG/G
1 PATCH TOPICAL
Status: DISCONTINUED | OUTPATIENT
Start: 2025-02-15 | End: 2025-02-16 | Stop reason: HOSPADM

## 2025-02-15 RX ADMIN — GABAPENTIN 300 MG: 300 CAPSULE ORAL at 08:02

## 2025-02-15 RX ADMIN — OXYCODONE HYDROCHLORIDE 10 MG: 10 TABLET ORAL at 06:02

## 2025-02-15 RX ADMIN — IBUPROFEN 600 MG: 600 TABLET ORAL at 11:02

## 2025-02-15 RX ADMIN — ACETAMINOPHEN 650 MG: 325 TABLET, FILM COATED ORAL at 05:02

## 2025-02-15 RX ADMIN — ACETAMINOPHEN 650 MG: 325 TABLET, FILM COATED ORAL at 11:02

## 2025-02-15 RX ADMIN — DOCUSATE SODIUM 200 MG: 100 CAPSULE, LIQUID FILLED ORAL at 08:02

## 2025-02-15 RX ADMIN — PRENATAL VIT W/ FE FUMARATE-FA TAB 27-0.8 MG 1 TABLET: 27-0.8 TAB at 08:02

## 2025-02-15 RX ADMIN — OXYCODONE HYDROCHLORIDE 10 MG: 10 TABLET ORAL at 12:02

## 2025-02-15 RX ADMIN — SIMETHICONE 80 MG: 80 TABLET, CHEWABLE ORAL at 02:02

## 2025-02-15 RX ADMIN — ENOXAPARIN SODIUM 40 MG: 40 INJECTION SUBCUTANEOUS at 08:02

## 2025-02-15 RX ADMIN — SENNOSIDES AND DOCUSATE SODIUM 1 TABLET: 50; 8.6 TABLET ORAL at 06:02

## 2025-02-15 RX ADMIN — IBUPROFEN 600 MG: 600 TABLET ORAL at 05:02

## 2025-02-15 RX ADMIN — GABAPENTIN 300 MG: 300 CAPSULE ORAL at 09:02

## 2025-02-15 RX ADMIN — POLYETHYLENE GLYCOL 3350 17 G: 17 POWDER, FOR SOLUTION ORAL at 08:02

## 2025-02-15 RX ADMIN — IBUPROFEN 600 MG: 600 TABLET ORAL at 12:02

## 2025-02-15 RX ADMIN — DIPHENHYDRAMINE HYDROCHLORIDE 25 MG: 25 CAPSULE ORAL at 06:02

## 2025-02-15 RX ADMIN — ENOXAPARIN SODIUM 40 MG: 40 INJECTION SUBCUTANEOUS at 09:02

## 2025-02-15 RX ADMIN — ACETAMINOPHEN 650 MG: 325 TABLET, FILM COATED ORAL at 06:02

## 2025-02-15 RX ADMIN — FERROUS SULFATE TAB 325 MG (65 MG ELEMENTAL FE) 1 EACH: 325 (65 FE) TAB at 08:02

## 2025-02-15 RX ADMIN — IBUPROFEN 600 MG: 600 TABLET ORAL at 06:02

## 2025-02-15 RX ADMIN — LIDOCAINE 1 PATCH: 50 PATCH CUTANEOUS at 08:02

## 2025-02-15 RX ADMIN — GABAPENTIN 300 MG: 300 CAPSULE ORAL at 02:02

## 2025-02-15 RX ADMIN — ACETAMINOPHEN 650 MG: 325 TABLET, FILM COATED ORAL at 12:02

## 2025-02-15 RX ADMIN — DOCUSATE SODIUM 200 MG: 100 CAPSULE, LIQUID FILLED ORAL at 09:02

## 2025-02-15 NOTE — PROGRESS NOTES
"POSTPARTUM PROGRESS NOTE    Subjective:     PPD/POD#: 2   Procedure: Primary LTCS   EGA: 39w2d   N/V: No   F/C: No   Abd Pain: Mild, well-controlled with oral pain medication   Lochia: Mild   Voiding: Yes   Ambulating: Yes   Bowel fnc: No   Contraception: Declines     Patient reports that she is feeling well this AM. She reports that her pain is well controlled with oral pain medications. However reporting increased incisional "burning pain". Patient reports that she is constipated and requested miralax this AM.     Objective:      Temp:  [97.9 °F (36.6 °C)-98.5 °F (36.9 °C)] 97.9 °F (36.6 °C)  Pulse:  [] 95  Resp:  [16-18] 16  SpO2:  [96 %-98 %] 96 %  BP: (110-118)/(70-77) 110/70    Abdomen: Soft, appropriately tender   Uterus: Firm, no fundal tenderness   Incision: Bandage in place without shadowing     Lab Review    No results for input(s): "NA", "K", "CL", "CO2", "BUN", "CREATININE", "GLU", "PROT", "BILITOT", "ALKPHOS", "ALT", "AST", "MG", "PHOS" in the last 168 hours.    Recent Labs   Lab 02/12/25  0444 02/14/25  0503   WBC 9.65 15.00*   HGB 10.6* 8.3*   HCT 32.4* 25.9*   MCV 92 93    268     I/O  No intake or output data in the 24 hours ending 02/15/25 0620       Assessment and Plan:   Postpartum care:  - Patient doing well  - Patient reporting increased incisional pain, will add lidocaine patch & gabapentin   - Continue routine management and advances.    Obesity  - PrePreg BMI 40  - Encourage ambulation  - Lovenox: 40 mg BID    Anemia/ABLA  - H/H as above  - QBL: 730cc  - asymptomatic  - iron/colace    Ramandeep Haywood MD  [facility]   OBGYN PGY-1    "

## 2025-02-15 NOTE — PLAN OF CARE
Patient safety maintained, side rails up, bed low and locked position. Pt ambulating and voiding independently.  Pain well controlled with PRN pain medication. Fundus midline, firm, with moderate  lochia. Patient responding to infant cues.  Incision site dressing dry, and intact.  Mother at bedside and assisting in patient's care. Will continue to monitor.     Problem: Adult Inpatient Plan of Care  Goal: Plan of Care Review  Outcome: Progressing  Goal: Patient-Specific Goal (Individualized)  Outcome: Progressing  Goal: Absence of Hospital-Acquired Illness or Injury  Outcome: Progressing  Goal: Optimal Comfort and Wellbeing  Outcome: Progressing  Goal: Readiness for Transition of Care  Outcome: Progressing     Problem: Bariatric Environmental Safety  Goal: Safety Maintained with Care  Outcome: Progressing     Problem:  Fall Injury Risk  Goal: Absence of Fall, Infant Drop and Related Injury  Outcome: Progressing     Problem: Infection  Goal: Absence of Infection Signs and Symptoms  Outcome: Progressing     Problem: Skin Injury Risk Increased  Goal: Skin Health and Integrity  Outcome: Progressing     Problem: Fall Injury Risk  Goal: Absence of Fall and Fall-Related Injury  Outcome: Progressing     Problem: Pain Acute  Goal: Optimal Pain Control and Function  Outcome: Progressing     Problem: Fatigue  Goal: Improved Activity Tolerance  Outcome: Progressing     Problem: Anemia  Goal: Anemia Symptom Improvement  Outcome: Progressing     Problem: Wound  Goal: Optimal Coping  Outcome: Progressing  Goal: Optimal Functional Ability  Outcome: Progressing  Goal: Absence of Infection Signs and Symptoms  Outcome: Progressing  Goal: Improved Oral Intake  Outcome: Progressing  Goal: Optimal Pain Control and Function  Outcome: Progressing  Goal: Skin Health and Integrity  Outcome: Progressing  Goal: Optimal Wound Healing  Outcome: Progressing     Problem: Postpartum ( Delivery)  Goal: Successful Parent Role  Transition  Outcome: Progressing  Goal: Hemostasis  Outcome: Progressing  Goal: Effective Bowel Elimination  Outcome: Progressing  Goal: Fluid and Electrolyte Balance  Outcome: Progressing  Goal: Absence of Infection Signs and Symptoms  Outcome: Progressing  Goal: Anesthesia/Sedation Recovery  Outcome: Progressing  Goal: Optimal Pain Control and Function  Outcome: Progressing  Goal: Nausea and Vomiting Relief  Outcome: Progressing  Goal: Effective Urinary Elimination  Outcome: Progressing  Goal: Effective Oxygenation and Ventilation  Outcome: Progressing     Problem: Breastfeeding  Goal: Effective Breastfeeding  Outcome: Progressing     Problem: Labor  Goal: Hemostasis  Outcome: Met  Goal: Stable Fetal Wellbeing  Outcome: Met  Goal: Effective Progression to Delivery  Outcome: Met  Goal: Absence of Infection Signs and Symptoms  Outcome: Met  Goal: Acceptable Pain Control  Outcome: Met  Goal: Normal Uterine Contraction Pattern  Outcome: Met     Problem: Anesthesia/Analgesia, Neuraxial  Goal: Safe, Effective Infusion Delivery  Outcome: Met  Goal: Stable Patient-Fetal Status  Outcome: Met  Goal: Absence of Infection Signs and Symptoms  Outcome: Met  Goal: Nausea and Vomiting Relief  Outcome: Met  Goal: Effective Pain Control  Outcome: Met  Goal: Effective Oxygenation and Ventilation  Outcome: Met  Goal: Baseline Motor Function Return  Outcome: Met  Goal: Effective Urinary Elimination  Outcome: Met

## 2025-02-15 NOTE — LACTATION NOTE
02/14/25 1615   Maternal Assessment   Breast Shape Bilateral:;angled   Breast Density Bilateral:;soft   Areola Bilateral:;elastic   Nipples Bilateral:;inverted   Maternal Infant Feeding   Maternal Emotional State assist needed;relaxed   Infant Positioning clutch/football  (max help)   Signs of Milk Transfer infant jaw motion present   Latch Assistance yes   Equipment Type   Breast Pump Type double electric, hospital grade   Breast Pump Flange Type hard   Breast Pump Flange Size   (18 mm)   Breast Pumping   Breast Pumping Interventions post-feed pumping encouraged     Assisted pt with breastfeeding. Pt with inverted nipples. Several latch attempts on right breast. Baby able to latch with max assistance. Pt feels tugs at breast. Assisted pt with left breast. Baby was unable to sustain latch. Discuss feeding baby supplement and pumping for breastmilk production. Assisted pt with use of symphony breastpump using initiation setting.  A few drops expressed. Recommended pt to offer breast, if baby does not latch, supplement and pump. Educated pt on use of breastpump and cleaning of breast pump set up. Support given.

## 2025-02-15 NOTE — LACTATION NOTE
"Lactation Round: Pt on the phone when LC entered room. Pt tearful. LC asked Pt if there was anything that LC or nurse can help with at this time. Pt declined stating, "I just need to get myself together." LC reminded that that nurse and LC are available for support. LC placed contact number on board and encouraged Pt to call as needed. Nurse notified.   "

## 2025-02-15 NOTE — PLAN OF CARE
Pt doing well. Pt ambulating and voiding without difficulty. Pain moderately controlled with rx meds.

## 2025-02-16 ENCOUNTER — PATIENT MESSAGE (OUTPATIENT)
Dept: LACTATION | Facility: CLINIC | Age: 35
End: 2025-02-16
Payer: MEDICAID

## 2025-02-16 VITALS
RESPIRATION RATE: 18 BRPM | HEIGHT: 66 IN | OXYGEN SATURATION: 96 % | SYSTOLIC BLOOD PRESSURE: 122 MMHG | BODY MASS INDEX: 40.74 KG/M2 | HEART RATE: 102 BPM | DIASTOLIC BLOOD PRESSURE: 74 MMHG | WEIGHT: 253.5 LBS | TEMPERATURE: 98 F

## 2025-02-16 PROCEDURE — 25000003 PHARM REV CODE 250

## 2025-02-16 PROCEDURE — 63600175 PHARM REV CODE 636 W HCPCS

## 2025-02-16 RX ORDER — FERROUS SULFATE 325(65) MG
325 TABLET ORAL
Qty: 30 TABLET | Refills: 2 | Status: SHIPPED | OUTPATIENT
Start: 2025-02-16

## 2025-02-16 RX ORDER — GABAPENTIN 300 MG/1
300 CAPSULE ORAL 3 TIMES DAILY
Qty: 15 CAPSULE | Refills: 0 | Status: SHIPPED | OUTPATIENT
Start: 2025-02-16 | End: 2025-02-21

## 2025-02-16 RX ORDER — LIDOCAINE 50 MG/G
1 PATCH TOPICAL DAILY
Qty: 10 PATCH | Refills: 0 | Status: SHIPPED | OUTPATIENT
Start: 2025-02-16

## 2025-02-16 RX ADMIN — ACETAMINOPHEN 650 MG: 325 TABLET, FILM COATED ORAL at 12:02

## 2025-02-16 RX ADMIN — IBUPROFEN 600 MG: 600 TABLET ORAL at 12:02

## 2025-02-16 RX ADMIN — LIDOCAINE 1 PATCH: 50 PATCH CUTANEOUS at 10:02

## 2025-02-16 RX ADMIN — OXYCODONE HYDROCHLORIDE 5 MG: 5 TABLET ORAL at 02:02

## 2025-02-16 RX ADMIN — DOCUSATE SODIUM 200 MG: 100 CAPSULE, LIQUID FILLED ORAL at 08:02

## 2025-02-16 RX ADMIN — DIPHENHYDRAMINE HYDROCHLORIDE 25 MG: 25 CAPSULE ORAL at 02:02

## 2025-02-16 RX ADMIN — FERROUS SULFATE TAB 325 MG (65 MG ELEMENTAL FE) 1 EACH: 325 (65 FE) TAB at 08:02

## 2025-02-16 RX ADMIN — SIMETHICONE 80 MG: 80 TABLET, CHEWABLE ORAL at 01:02

## 2025-02-16 RX ADMIN — ACETAMINOPHEN 650 MG: 325 TABLET, FILM COATED ORAL at 05:02

## 2025-02-16 RX ADMIN — ENOXAPARIN SODIUM 40 MG: 40 INJECTION SUBCUTANEOUS at 08:02

## 2025-02-16 RX ADMIN — POLYETHYLENE GLYCOL 3350 17 G: 17 POWDER, FOR SOLUTION ORAL at 10:02

## 2025-02-16 RX ADMIN — GABAPENTIN 300 MG: 300 CAPSULE ORAL at 08:02

## 2025-02-16 RX ADMIN — IBUPROFEN 600 MG: 600 TABLET ORAL at 05:02

## 2025-02-16 RX ADMIN — PRENATAL VIT W/ FE FUMARATE-FA TAB 27-0.8 MG 1 TABLET: 27-0.8 TAB at 08:02

## 2025-02-16 NOTE — PROGRESS NOTES
Mother Baby Care Guide reviewed. Pt verbalized understanding that she will check her BP at least once a day via [employer] once discharged. Pt also verbalized understanding that she will call/come in through the [facility] for any SS of HTN or any severe range BP.

## 2025-02-16 NOTE — PLAN OF CARE
VSS. Pain controlled with oral pain medication. Formula feeding. Fundus firm and midline with light lochia rubra. LTV dressing in place, dry/intact with a small amount of drainage; no odor. Voiding spontaneously with adequate output. Passing gas. No concerns at this time.

## 2025-02-16 NOTE — PLAN OF CARE
Patient in no apparent distress. VSS. Ambulating without difficulty. No needs at this time. Discharge papers reviewed. All questions answered. Awaiting escort.    Problem: Adult Inpatient Plan of Care  Goal: Plan of Care Review  Outcome: Met  Goal: Patient-Specific Goal (Individualized)  Outcome: Met  Goal: Absence of Hospital-Acquired Illness or Injury  Outcome: Met  Goal: Optimal Comfort and Wellbeing  Outcome: Met  Goal: Readiness for Transition of Care  Outcome: Met     Problem: Bariatric Environmental Safety  Goal: Safety Maintained with Care  Outcome: Met     Problem:  Fall Injury Risk  Goal: Absence of Fall, Infant Drop and Related Injury  Outcome: Met     Problem: Infection  Goal: Absence of Infection Signs and Symptoms  Outcome: Met     Problem: Skin Injury Risk Increased  Goal: Skin Health and Integrity  Outcome: Met     Problem: Fall Injury Risk  Goal: Absence of Fall and Fall-Related Injury  Outcome: Met     Problem: Pain Acute  Goal: Optimal Pain Control and Function  Outcome: Met     Problem: Fatigue  Goal: Improved Activity Tolerance  Outcome: Met     Problem: Anemia  Goal: Anemia Symptom Improvement  Outcome: Met     Problem: Wound  Goal: Optimal Coping  Outcome: Met  Goal: Optimal Functional Ability  Outcome: Met  Goal: Absence of Infection Signs and Symptoms  Outcome: Met  Goal: Improved Oral Intake  Outcome: Met  Goal: Optimal Pain Control and Function  Outcome: Met  Goal: Skin Health and Integrity  Outcome: Met  Goal: Optimal Wound Healing  Outcome: Met     Problem: Postpartum ( Delivery)  Goal: Successful Parent Role Transition  Outcome: Met  Goal: Hemostasis  Outcome: Met  Goal: Effective Bowel Elimination  Outcome: Met  Goal: Fluid and Electrolyte Balance  Outcome: Met  Goal: Absence of Infection Signs and Symptoms  Outcome: Met  Goal: Anesthesia/Sedation Recovery  Outcome: Met  Goal: Optimal Pain Control and Function  Outcome: Met  Goal: Nausea and Vomiting Relief  Outcome:  Met  Goal: Effective Urinary Elimination  Outcome: Met  Goal: Effective Oxygenation and Ventilation  Outcome: Met     Problem: Breastfeeding  Goal: Effective Breastfeeding  Outcome: Met

## 2025-02-16 NOTE — PROGRESS NOTES
"POSTPARTUM PROGRESS NOTE    Subjective:     PPD/POD#: 3   Procedure: Primary LTCS   EGA: 39w2d   N/V: No   F/C: No   Abd Pain: Mild, well-controlled with oral pain medication   Lochia: Mild   Voiding: Yes   Ambulating: Yes   Bowel fnc: Yes   Contraception: Declines      Objective:      Temp:  [97.7 °F (36.5 °C)-98.5 °F (36.9 °C)] 97.7 °F (36.5 °C)  Pulse:  [] 93  Resp:  [15-18] 17  SpO2:  [97 %-98 %] 97 %  BP: (111-123)/(68-75) 123/75    Abdomen: Soft, appropriately tender   Uterus: Firm, no fundal tenderness   Incision: Bandage in place without shadowing   2+ BL nonpitting edema to ankles    Lab Review    No results for input(s): "NA", "K", "CL", "CO2", "BUN", "CREATININE", "GLU", "PROT", "BILITOT", "ALKPHOS", "ALT", "AST", "MG", "PHOS" in the last 168 hours.    Recent Labs   Lab 02/12/25  0444 02/14/25  0503   WBC 9.65 15.00*   HGB 10.6* 8.3*   HCT 32.4* 25.9*   MCV 92 93    268     I/O  No intake or output data in the 24 hours ending 02/16/25 0306       Assessment and Plan:   Postpartum care:  - Patient doing well  - Continue routine management and advances.    Obesity  - PrePreg BMI 40  - Encourage ambulation  - Lovenox: 40 mg BID    Anemia/ABLA  - H/H as above  - QBL: 730cc  - asymptomatic  - iron/colace    Melissa Carrillo MD  Obstetrics & Gynecology, PGY-1   "

## 2025-02-16 NOTE — LACTATION NOTE
Pt shared that she intends to pump and bottle feed. LC stressed the importance of pumping eight or more in twenty-four to build/maintain supply. LC encouraged Pt to pump 15-20 minutes, but not more than 30 minutes. Lactation discharge education completed. Plan of care is for pt to follow basic breastfeeding education, frequent feeding based on baby's cues, and to monitor baby's voids and stools. Breastfeeding guide, including First Alert survey, resource list, and lactation warmline phone number reviewed. Pt to notify doctor for maternal or infant concerns, as reviewed with LC. Pt verbalizes understanding and questions answered. Pt has rental pump for week. LC reviewed use and maintenance of [employer] Symphony pump. Pt aware to call and extend if needed beyond one week.

## 2025-02-16 NOTE — DISCHARGE SUMMARY
Delivery Discharge Summary  Obstetrics      Primary OB Clinician: Ellen Han MD     Admission date: 2025  Discharge date: 2025    Disposition: To home, self care    Discharge Diagnosis List:      Patient Active Problem List   Diagnosis    Chronic non-seasonal allergic rhinitis    Allergic conjunctivitis, bilateral    Increased BMI    Constipation    Refused influenza vaccine    Weight gain    Adjustment disorder with depressed mood    Caregiver stress    Stress at work    Normal first pregnancy confirmed, currently in second trimester    History of herpes genitalis    Abnormal posture    Decreased strength    Acute right-sided low back pain with right-sided sciatica    Obesity affecting pregnancy, antepartum    Anemia during pregnancy    s/p pLTCS (fetal intolerance/failed IOL)    ABLA (acute blood loss anemia)       Procedure: , due to failed IOL and Fetal intolerance to labor    Hospital Course:  Purvi Ann is a 34 y.o. now , POD #3 who was admitted on 2025 at 39w0d for IOL. Patient was subsequently admitted to labor and delivery unit with signed consents.     Labor course was complicated by failed IOL and fetal intolerance to labor, and decision was made to proceed with delivery via  which was performed without complications.    Please see delivery note for further details. Her postpartum course was complicated by obesity and the patient was given lovenox BID for DVT prophylaxis. The patient was also continued on iron and colace for acute blood loss anemia. On discharge day, patient's pain is controlled with oral pain medications. Pt is tolerating ambulation without SOB or CP, and regular diet without N/V. Reports lochia is mild. Denies any HA, vision changes, F/C, LE swelling. Denies any breast pain/soreness.    Pt in stable condition and ready for discharge. She has been instructed to start and/or continue medications and follow up with her  "obstetrics provider as listed below.    Pertinent studies:  CBC  Recent Labs   Lab 25  0444 25  0503   WBC 9.65 15.00*   HGB 10.6* 8.3*   HCT 32.4* 25.9*   MCV 92 93    268      Immunization History   Administered Date(s) Administered    COVID-19, MRNA, LN-S, PF (Pfizer) (Purple Cap) 2021, 2021    DTaP 1992, 1992, 1992, 1993, 1996    HIB 1992, 1992, 1992, 1993    HPV Quadrivalent 2008, 2008, 2008    Hepatitis B, Pediatric/Adolescent 2003, 2008, 2009, 2009    IPV 1992, 1992, 1993, 1996    MMR 1993, 1996    Meningococcal Conjugate (MCV4P) 2008    PPD Test 06/10/2009    Pneumococcal Polysaccharide - 23 Valent 2017    Td (ADULT) 2003    Td - PF (ADULT) 2003    Tdap 2009, 2024    Varicella 2003, 2008        Delivery:    Episiotomy:     Lacerations:     Repair suture:     Repair # of packets:     Blood loss (ml):       Birth information:  YOB: 2024   Time of birth: 6:57 AM   Sex: male   Delivery type: , Low Transverse   Gestational Age: 39w2d     Measurements    Weight: 3110 g  Weight (lbs): 6 lb 13.7 oz  Length: 49.5 cm  Length (in): 19.5"  Head circumference: 34.5 cm  Chest circumference: 30.5 cm         Delivery Clinician: Delivery Providers    Delivering clinician: Deana Bunrs MD   Provider Role    Zahira Altamirano MD Carter, Toki W, RN Toups, Jamie, ST Turner, Teiraney A, RN              Additional  information:  Forceps:    Vacuum:    Breech:    Observed anomalies      Living?:     Apgars    Living status: Living  Apgar Component Scores:  1 min.:  5 min.:  10 min.:  15 min.:  20 min.:    Skin color:  0  1       Heart rate:  2  2       Reflex irritability:  2  2       Muscle tone:  2  2       Respiratory effort:  2  2       Total:  8  9                Placenta: " Delivered:       appearance    Patient Instructions:   Current Discharge Medication List        START taking these medications    Details   acetaminophen (TYLENOL) 325 MG tablet Take 2 tablets (650 mg total) by mouth every 6 (six) hours.  Qty: 30 tablet, Refills: 1    Associated Diagnoses: Status post primary low transverse  section      docusate sodium (COLACE) 100 MG capsule Take 2 capsules (200 mg total) by mouth 2 (two) times daily.  Qty: 30 capsule, Refills: 1    Associated Diagnoses: Status post primary low transverse  section      gabapentin (NEURONTIN) 300 MG capsule Take 1 capsule (300 mg total) by mouth 3 (three) times daily. for 5 days  Qty: 15 capsule, Refills: 0      ibuprofen (ADVIL,MOTRIN) 600 MG tablet Take 1 tablet (600 mg total) by mouth every 6 (six) hours.  Qty: 30 tablet, Refills: 1    Associated Diagnoses: Status post primary low transverse  section      LIDOcaine (LIDODERM) 5 % Place 1 patch onto the skin once daily. Remove & Discard patch within 12 hours or as directed by MD  Qty: 10 patch, Refills: 0      oxyCODONE (ROXICODONE) 5 MG immediate release tablet Take 1 tablet (5 mg total) by mouth every 4 (four) hours as needed for Pain.  Qty: 15 tablet, Refills: 0    Comments: Quantity prescribed more than 7 day supply? No  Associated Diagnoses: Status post primary low transverse  section           CONTINUE these medications which have CHANGED    Details   ferrous sulfate 325 (65 FE) MG EC tablet Take 1 tablet (325 mg total) by mouth once daily.  Qty: 30 tablet, Refills: 1    Associated Diagnoses: Status post primary low transverse  section           CONTINUE these medications which have NOT CHANGED    Details   famotidine (PEPCID) 20 MG tablet TAKE 1 TABLET(20 MG) BY MOUTH TWICE DAILY  Qty: 180 tablet, Refills: 2    Comments: **Patient requests 90 days supply**      guaiFENesin (MUCINEX) 600 mg 12 hr tablet Take 1,200 mg by mouth Daily.      prenatal vit  no.124/iron/folic (PRENATAL VITAMIN ORAL) Take 1 Dose by mouth Daily.      valACYclovir (VALTREX) 500 MG tablet Take 1 tablet (500 mg total) by mouth 2 (two) times daily.  Qty: 60 tablet, Refills: 0    Associated Diagnoses: History of positive PCR for herpes simplex virus type 2 (HSV-2) DNA           STOP taking these medications       aspirin (ECOTRIN) 81 MG EC tablet Comments:   Reason for Stopping:               Discharge Procedure Orders   BREAST PUMP FOR HOME USE     Order Specific Question Answer Comments   Type of pump: Electric    Weight: 115 kg (253 lb 8.5 oz)    Length of need (1-99 months): 48      Diet Adult Regular     Diet Adult Regular     No driving until:   Order Comments: No driving until not taking narcotic pain medication.     Pelvic Rest   Order Comments: Pelvic rest until 6 weeks after discharge. Nothing in vagina -no sex, tampons, douching, etc.     Notify your health care provider if you experience any of the following:  temperature >100.4     Notify your health care provider if you experience any of the following:  persistent nausea and vomiting or diarrhea     Notify your health care provider if you experience any of the following:  severe uncontrolled pain     Notify your health care provider if you experience any of the following:  redness, tenderness, or signs of infection (pain, swelling, redness, odor or green/yellow discharge around incision site)     Notify your health care provider if you experience any of the following:  difficulty breathing or increased cough     Notify your health care provider if you experience any of the following:  severe persistent headache     Notify your health care provider if you experience any of the following:  worsening rash     Notify your health care provider if you experience any of the following:  persistent dizziness, light-headedness, or visual disturbances     Notify your health care provider if you experience any of the following:  increased  confusion or weakness     Notify your health care provider if you experience any of the following:   Order Comments: Heavy vaginal bleeding saturating more than 1 pad per hr for at least consecutive 2 hrs.     Lifting restrictions   Order Comments: No lifting more than 15 pounds for 6 weeks     No driving until:   Order Comments: - Not taking narcotic pain medications  - You feel that you can safely slam on the brakes     Pelvic Rest   Order Comments: Nothing in vagina until evaluation at postpartum visit (no sex, tampons, or douching)     No dressing needed     Notify your health care provider if you experience any of the following:  temperature >100.4     Notify your health care provider if you experience any of the following:  persistent nausea and vomiting or diarrhea     Notify your health care provider if you experience any of the following:  severe uncontrolled pain     Notify your health care provider if you experience any of the following:  redness, tenderness, or signs of infection (pain, swelling, redness, odor or green/yellow discharge around incision site)     Notify your health care provider if you experience any of the following:  difficulty breathing or increased cough     Notify your health care provider if you experience any of the following:  severe persistent headache     Notify your health care provider if you experience any of the following:  worsening rash     Notify your health care provider if you experience any of the following:  persistent dizziness, light-headedness, or visual disturbances     Notify your health care provider if you experience any of the following:  increased confusion or weakness     Notify your health care provider if you experience any of the following:   Order Comments: - Heavy vaginal bleeding soaking through more than 2 pads/hour for > 2 hours  - Severe abdominal pain  - Drainage from your incision  - Headache not relieved with Tylenol  - Vision changes  - Chest  pain or shortness or breath     Activity as tolerated     Shower on day dressing removed (No bath)   Order Comments: Do not submerge your incision in a bathtub until evaluation at postpartum visit        Follow-up Information       Ellen Han MD Follow up in 6 week(s).    Specialty: Obstetrics and Gynecology  Why: Post partum visit  Contact information:  [address] [address]  [phone]                            Flavia Fry MD  [facility]   OBGYN PGY3

## 2025-02-17 ENCOUNTER — PATIENT MESSAGE (OUTPATIENT)
Dept: OBSTETRICS AND GYNECOLOGY | Facility: OTHER | Age: 35
End: 2025-02-17
Payer: MEDICAID

## 2025-02-18 ENCOUNTER — E-VISIT (OUTPATIENT)
Dept: OBSTETRICS AND GYNECOLOGY | Facility: CLINIC | Age: 35
End: 2025-02-18
Payer: MEDICAID

## 2025-02-18 DIAGNOSIS — N39.498 OTHER URINARY INCONTINENCE: Primary | ICD-10-CM

## 2025-02-18 DIAGNOSIS — Z98.891 STATUS POST PRIMARY LOW TRANSVERSE CESAREAN SECTION: ICD-10-CM

## 2025-02-19 NOTE — PROGRESS NOTES
Patient ID: Purvi Ann is a 34 y.o. female.    Chief Complaint: General Illness (Entered automatically based on patient selection in [employer].)    The patient initiated a request through [employer] on 2025 for evaluation and management with a chief complaint of General Illness (Entered automatically based on patient selection in [employer].)     I evaluated the questionnaire submission on 2025 .    [facility]    2025  4:13 PM CST - Filed by Patient   What do you need help with? Other Concern   Do you agree to participate in an E-Visit? Yes   If you have any of the following symptoms, please present to your local emergency room or call 911:  I acknowledge   Do you have any of the following pregnancy-related conditions? None   What is the main issue you would like addressed today? Not being able to hold urine   Please describe your symptoms Instant urination   Where is your problem located? Bladder   How severe are your symptoms? Severe   Have you had these symptoms before? No   How long have you been having these symptoms? For a few days   Please list any medications or treatments you have used for your condition and indicate if it was effective or not. None   What makes this feel better? Nothing   What makes this feel worse? The urge to urinate   Are these symptoms related to a condition that you currently have? No   Please describe any probable cause for these symptoms    Provide any additional information you feel is important. I had a csection   Please attach any relevant images or files    Are you able to take your vital signs? Yes   Systolic Blood Pressure:    Diastolic Blood Pressure:    Weight:    Height:    Pulse:    Temperature:    Respiration rate:    Pulse Oxygen:          Encounter Diagnoses   Name Primary?    Other urinary incontinence Yes    Status post primary low transverse  section         Orders Placed This Encounter   Procedures     Urine Culture High Risk           Send normal result to authorizing provider's In Basket if patient is active on MyChart::   Yes            No follow-ups on file.      E-Visit Time Tracking:    Day 1 Time (in minutes): 5    Total Time (in minutes): 5

## 2025-03-03 ENCOUNTER — CLINICAL SUPPORT (OUTPATIENT)
Facility: CLINIC | Age: 35
End: 2025-03-03

## 2025-03-03 DIAGNOSIS — Z98.891 STATUS POST PRIMARY LOW TRANSVERSE CESAREAN SECTION: Primary | ICD-10-CM

## 2025-03-04 VITALS
SYSTOLIC BLOOD PRESSURE: 126 MMHG | TEMPERATURE: 99 F | DIASTOLIC BLOOD PRESSURE: 85 MMHG | OXYGEN SATURATION: 99 % | RESPIRATION RATE: 18 BRPM | HEART RATE: 86 BPM

## 2025-03-05 NOTE — PROGRESS NOTES
Post Visit Nursing Note  Maternal Note  In-Home Visit    [facility] Consent:      Home visit completed 2025. This is the initial visit to the home by a [facility] nurse.     Return Visit Needed: No   (If yes) Return visit date:     Maternal History:    Purvi Ann is a 34 y.o. female Black or , who delivered at 39 weeks 2 days GA via .    Purvi Ann experienced the following pregnancy and delivery complications during this most recent pregnancy: anemia, HSV.    Vaccine History:   Immunization History   Administered Date(s) Administered    COVID-19, MRNA, LN-S, PF (Pfizer) (Purple Cap) 2021, 2021    DTaP 1992, 1992, 1992, 1993, 1996    HIB 1992, 1992, 1992, 1993    HPV Quadrivalent 2008, 2008, 2008    Hepatitis B, Pediatric/Adolescent 2003, 2008, 2009, 2009    IPV 1992, 1992, 1993, 1996    MMR 1993, 1996    Meningococcal Conjugate (MCV4P) 2008    PPD Test 06/10/2009    Pneumococcal Polysaccharide - 23 Valent 2017    Td (ADULT) 2003    Td - PF (ADULT) 2003    Tdap 2009, 2024    Varicella 2003, 2008       Maternal Assessment:    Vital Signs During Home Visit:   /85 (BP Location: Right arm, Patient Position: Sitting)   Pulse 86   Temp 98.5 °F (36.9 °C) (Temporal)   Resp 18   LMP 2024 (Exact Date)   SpO2 99%   [place]  Depression Scale (EPDS) During Home Visit: 2  Feeding: breast and bottle  Relationship Risk: 0  Substance Use Risk: 0  Contraception: no method  Plan for Additional Children: Undecided    2 weeks s/p  postpartum mom recovering very well. Vitals wnl. Reports some incisional pain after having a very active day and lifting something too heavy. Takes motrin as needed, a PNV, and iron. Stopped taking gabapentin and  oxycodone last week. Pumping and formula feeding infant. Pumps 4x daily and infant also receives Similac 360 2-3oz every 3 hours for feeding. Denies any SOB,HA, chest pain, or blurry vision. Infant sleeping in bassinet, progressing to 4 hour stretches of sleep at night. Mom reports having all  essential supplies. [facility] established, SNAP appt this Friday. Plans to return to work by , works [place]. Infant will be in the care of grandmother and aunt. Reports being in a good mood, denies experiencing any baby blues or crying. Reports infant is an easy going baby and she has a lot of familial help. PP follow up appt to be scheduled with Dr Han. Contact information given for any further questions or concerns.      Home Environment:    Home is safe, equipped with smoke and CO2 detectors to ensure their well-being.      Referrals:    none    Education Materials Provided:    Post-birth warning signs  Baby warning signs  Nurse call line  Urgent care  Know your birth control options  Birth control guide  Smart snack guide  Healthy sleep  Care for baby's skin   recommended immunization schedule  20 ideas for bonding with infants  Tummy time   [facility] appreciation  WePlay  Infant Crying & Shaken Baby  Secure gun storage  Carseat safety   Carseat testing sites  Hear the Beep where you Sleep  Lead poisoning checklist  What does a safe sleep environment look like?  Medicine Safety   Home Safety Checklist  [place] Parentline  PSI postpartum mood disorders   [facility] Hotline  PSI Online support groups  Smoking cessation  Snuggles & Struggles

## 2025-03-11 ENCOUNTER — PATIENT MESSAGE (OUTPATIENT)
Facility: CLINIC | Age: 35
End: 2025-03-11
Payer: MEDICAID

## 2025-03-27 ENCOUNTER — TELEPHONE (OUTPATIENT)
Dept: OBSTETRICS AND GYNECOLOGY | Facility: CLINIC | Age: 35
End: 2025-03-27
Payer: MEDICAID

## 2025-04-07 ENCOUNTER — POSTPARTUM VISIT (OUTPATIENT)
Dept: OBSTETRICS AND GYNECOLOGY | Facility: CLINIC | Age: 35
End: 2025-04-07
Payer: MEDICAID

## 2025-04-07 VITALS
BODY MASS INDEX: 37.49 KG/M2 | WEIGHT: 233.25 LBS | DIASTOLIC BLOOD PRESSURE: 72 MMHG | SYSTOLIC BLOOD PRESSURE: 122 MMHG | HEIGHT: 66 IN

## 2025-04-07 DIAGNOSIS — Z12.4 PAP SMEAR FOR CERVICAL CANCER SCREENING: ICD-10-CM

## 2025-04-07 PROCEDURE — 87624 HPV HI-RISK TYP POOLED RSLT: CPT | Performed by: FAMILY MEDICINE

## 2025-04-07 PROCEDURE — 99213 OFFICE O/P EST LOW 20 MIN: CPT | Mod: PBBFAC | Performed by: FAMILY MEDICINE

## 2025-04-07 PROCEDURE — 99999 PR PBB SHADOW E&M-EST. PATIENT-LVL III: CPT | Mod: PBBFAC,,, | Performed by: FAMILY MEDICINE

## 2025-04-07 NOTE — PROGRESS NOTES
"  Postpartum Visit  Purvi Ann is a 34 y.o. female  is here for a postpartum visit. She is 7 weeks postpartum following a low cervical transverse  section, of a male infant weighinlb 13oz, with Anesthesia:  CSE . . The delivery was at 39w 2d.     Pregnancy was complicated by: Obesity (ppBMI 40), h/o HSV (neg spec), and Anemia. .      Still some discomfort around her c section scar- unsure if she will be able to return to work full duty. [place] lifting 50-70lb. Will reach out to disability about what paperwork needs to be done.     Still doing iron, will complete current bottle then stop.   Sometimes feels "clumsy" but no dizziness/weakness.     OB History    Para Term  AB Living   1 1 1 0 0 1   SAB IAB Ectopic Multiple Live Births   0 0 0 0 1      # Outcome Date GA Lbr [place]/2nd Weight Sex Type Anes PTL Lv   1 Term 25 39w2d  3.11 kg (6 lb 13.7 oz) M CS-LTranv EPI N ROSA       Postpartum course has been uncomplicated.  Bleeding no bleeding currently, 1-2 weeks ago had some bleeding return. Spotting to period like then back to spotting. Bowel/ bladder function is normal. Her last pap was 2024 LSIL/+HPV- no bx done during colpo.  Patient is not sexually active. Desired contraception method is none. Not SA currently.  Postpartum depression screening: 3.  Baby's course has been uncomplicated. Baby is feeding by breast (pumping)    ROS:  GENERAL: No fever, chills, fatigability.  VULVAR: No pain, no lesions and no itching.  VAGINAL: No relaxation, no itching, no discharge, no abnormal bleeding and no lesions.  ABDOMEN: No abdominal pain. Denies nausea. Denies vomiting. No diarrhea. No constipation  BREAST: Denies pain. No lumps. No discharge.  URINARY: No incontinence, no nocturia, no frequency and no dysuria.  CARDIOVASCULAR: No chest pain. No shortness of breath. No leg cramps.  NEUROLOGICAL: No headaches. No vision changes.      General " appearance - alert, well appearing, and in no distress, oriented to person, place, and time, and normal appearing weight  Mental status - alert, oriented to person, place, and time, normal mood, behavior, speech, dress, motor activity, and thought processes  Skin - coloration normal for race, good turgor, warm to touch, no rashes  Abdomen - soft, nontender, nondistended, no masses or organomegaly  Pfannenstiel incision: Clean, dry, intact - healing well. Sensitive to touch right aspect but no s/s infection  Pelvic -   External genitalia postpartum: normal, well-healed, without lesions or masses.  Normal female hair distribution. Adequate perineal body. Urethral meatus without lesions or prolapse. Urethra: no masses, tenderness, or scarring.  Bladder: without tenderness or masses.  Vaginal mucosa moist and pink, normal rugae, without lesions, abnormal discharge, or foul odor.  Cervix pink, no lesions, no cervical motion tenderness.  Uterus: midline, non tender, smooth, not enlarged, not prolapsed  No adnexal masses or tenderness.  Extremities - no edema, redness or tenderness in the calves or thighs      Purvi was seen today for postpartum care.    Diagnoses and all orders for this visit:    Postpartum care and examination    Pap smear for cervical cancer screening  -     Liquid-Based Pap Smear, Screening        Discussed contraception - pt desires nothing at this time. Will let us know if she would like to discuss in the future  Counseling regarding resuming normal activities of exercise and work.  Postpartum precautions reviewed    Routine follow up in 1 yr

## 2025-04-08 ENCOUNTER — PATIENT MESSAGE (OUTPATIENT)
Dept: OBSTETRICS AND GYNECOLOGY | Facility: CLINIC | Age: 35
End: 2025-04-08
Payer: MEDICAID

## 2025-04-11 ENCOUNTER — OFFICE VISIT (OUTPATIENT)
Dept: URGENT CARE | Facility: CLINIC | Age: 35
End: 2025-04-11
Payer: MEDICAID

## 2025-04-11 VITALS
DIASTOLIC BLOOD PRESSURE: 68 MMHG | SYSTOLIC BLOOD PRESSURE: 116 MMHG | HEART RATE: 78 BPM | BODY MASS INDEX: 37.45 KG/M2 | OXYGEN SATURATION: 98 % | WEIGHT: 233 LBS | TEMPERATURE: 98 F | HEIGHT: 66 IN | RESPIRATION RATE: 20 BRPM

## 2025-04-11 DIAGNOSIS — R06.02 SOB (SHORTNESS OF BREATH): ICD-10-CM

## 2025-04-11 DIAGNOSIS — Z91.09 ENVIRONMENTAL ALLERGIES: Primary | ICD-10-CM

## 2025-04-11 LAB
CTP QC/QA: YES
CTP QC/QA: YES
POC MOLECULAR INFLUENZA A AGN: NEGATIVE
POC MOLECULAR INFLUENZA B AGN: NEGATIVE
SARS CORONAVIRUS 2 ANTIGEN: NEGATIVE

## 2025-04-11 PROCEDURE — 71046 X-RAY EXAM CHEST 2 VIEWS: CPT | Mod: FY,S$GLB,, | Performed by: RADIOLOGY

## 2025-04-11 RX ORDER — ALBUTEROL SULFATE 0.83 MG/ML
2.5 SOLUTION RESPIRATORY (INHALATION)
Status: COMPLETED | OUTPATIENT
Start: 2025-04-11 | End: 2025-04-11

## 2025-04-11 RX ORDER — ALBUTEROL SULFATE 90 UG/1
2 INHALANT RESPIRATORY (INHALATION) EVERY 6 HOURS PRN
Qty: 18 G | Refills: 0 | Status: SHIPPED | OUTPATIENT
Start: 2025-04-11

## 2025-04-11 RX ADMIN — ALBUTEROL SULFATE 2.5 MG: 0.83 SOLUTION RESPIRATORY (INHALATION) at 09:04

## 2025-04-11 NOTE — LETTER
April 11, 2025      [facility] [address]  [place] [address]  Phone: [phone]  Fax: [phone]       Patient: Purvi Ann   YOB: 1990  Date of Visit: 04/11/2025    To Whom It May Concern:    SOY Ann  was at [facility] on 04/11/2025. The patient may return to work/school on 4/11/25 with no restrictions. If you have any questions or concerns, or if I can be of further assistance, please do not hesitate to contact me.    Sincerely,    Kortney Ca RT

## 2025-04-11 NOTE — PROGRESS NOTES
"Subjective:      Patient ID: Purvi Ann is a 34 y.o. female.    Vitals:  height is 5' 6" (1.676 m) and weight is 105.7 kg (233 lb). Her oral temperature is 98 °F (36.7 °C). Her blood pressure is 116/68 and her pulse is 78. Her respiration is 20 and oxygen saturation is 98%.     Chief Complaint: Shortness of Breath and cleaning aida room    34 yr old female presents to the Urgent Care with complaint of dry cough and shortness of breath x 2 days. Symptoms began after cleaning out a aida room. Patient denies any CP, abdominal pain, runny nose, congestion or fever at this time.    Cough  This is a new problem. The current episode started in the past 7 days. The problem has been unchanged. The cough is Non-productive. Associated symptoms include shortness of breath. Pertinent negatives include no chest pain, chills, ear congestion, ear pain, fever, headaches, heartburn, hemoptysis, myalgias, nasal congestion, postnasal drip, rash, rhinorrhea, sore throat, sweats, weight loss or wheezing. Her past medical history is significant for bronchitis and environmental allergies. There is no history of asthma, bronchiectasis, COPD, emphysema or pneumonia.       Constitution: Negative for chills, sweating, fatigue and fever.   HENT:  Negative for ear pain, postnasal drip and sore throat.    Cardiovascular:  Negative for chest pain and sob on exertion.   Respiratory:  Positive for cough and shortness of breath. Negative for sputum production, bloody sputum and wheezing.    Gastrointestinal:  Negative for heartburn.   Musculoskeletal:  Negative for muscle ache.   Skin:  Negative for rash.   Allergic/Immunologic: Positive for environmental allergies.   Neurological:  Negative for headaches.      Objective:     Physical Exam   Constitutional: She is oriented to person, place, and time. She appears well-developed. She is cooperative.  Non-toxic appearance. She does not appear ill. No distress.   HENT:   Head: " Normocephalic and atraumatic.   Ears:   Right Ear: Hearing, tympanic membrane, external ear and ear canal normal.   Left Ear: Hearing, tympanic membrane, external ear and ear canal normal.   Nose: Nose normal. No mucosal edema, rhinorrhea or nasal deformity. No epistaxis. Right sinus exhibits no maxillary sinus tenderness and no frontal sinus tenderness. Left sinus exhibits no maxillary sinus tenderness and no frontal sinus tenderness.   Mouth/Throat: Uvula is midline, oropharynx is clear and moist and mucous membranes are normal. No trismus in the jaw. Normal dentition. No uvula swelling. No oropharyngeal exudate, posterior oropharyngeal edema, posterior oropharyngeal erythema, tonsillar abscesses or cobblestoning.   Eyes: Conjunctivae and lids are normal. No scleral icterus.   Neck: Trachea normal and phonation normal. Neck supple. No edema present. No erythema present. No neck rigidity present.   Cardiovascular: Normal rate, regular rhythm, normal heart sounds and normal pulses.   Pulmonary/Chest: Effort normal and breath sounds normal. No stridor. No respiratory distress. She has no decreased breath sounds. She has no wheezes. She has no rhonchi. She has no rales.   Abdominal: Normal appearance.   Musculoskeletal: Normal range of motion.         General: No deformity. Normal range of motion.   Neurological: She is alert and oriented to person, place, and time. She exhibits normal muscle tone. Coordination normal.   Skin: Skin is warm, dry, intact, not diaphoretic and not pale. Capillary refill takes less than 2 seconds.   Psychiatric: Her speech is normal and behavior is normal. Judgment and thought content normal.   Nursing note and vitals reviewed.      Assessment:     1. Environmental allergies    2. SOB (shortness of breath)    FINDINGS:  The lungs are symmetrically expanded.  Linear opacities in the bilateral lower lung zones, scarring versus atelectasis.  No lobar consolidation.  No pleural effusion or  pneumothorax. Pulmonary vasculature is normal in appearance.  No free air under the diaphragm.     The cardiomediastinal silhouette is normal in size. The hilar and mediastinal contours are normal.     No acute fractures or dislocations.     Impression:     No acute cardiopulmonary disease    Plan:   DDx: Pneumonia, asthma, bronchitis, allergies    Breathing tx given in clinic. Patient report symptoms noted to have resolved. Chest x ray show no acute findings.   Discharged home with albuterol inhaler. Advised pt to f/u with PCP for further evaluation  ER precautions discussed.  Patient verbalized understanding and agreed with treatment plan.    Environmental allergies  -     albuterol (PROVENTIL HFA) 90 mcg/actuation inhaler; Inhale 2 puffs into the lungs every 6 (six) hours as needed for Wheezing. Rescue  Dispense: 18 g; Refill: 0    SOB (shortness of breath)  -     SARS Coronavirus 2 Antigen, POCT Manual Read  -     POCT Influenza A/B MOLECULAR  -     XR CHEST PA AND LATERAL; Future; Expected date: 04/11/2025  -     albuterol nebulizer solution 2.5 mg

## 2025-04-11 NOTE — PATIENT INSTRUCTIONS

## 2025-04-17 ENCOUNTER — OFFICE VISIT (OUTPATIENT)
Dept: URGENT CARE | Facility: CLINIC | Age: 35
End: 2025-04-17
Payer: MEDICAID

## 2025-04-17 VITALS
OXYGEN SATURATION: 98 % | RESPIRATION RATE: 20 BRPM | TEMPERATURE: 98 F | HEIGHT: 66 IN | WEIGHT: 233 LBS | DIASTOLIC BLOOD PRESSURE: 84 MMHG | HEART RATE: 70 BPM | SYSTOLIC BLOOD PRESSURE: 116 MMHG | BODY MASS INDEX: 37.45 KG/M2

## 2025-04-17 DIAGNOSIS — H65.199 ACUTE MIDDLE EAR EFFUSION, UNSPECIFIED LATERALITY: Primary | ICD-10-CM

## 2025-04-17 DIAGNOSIS — R07.89 FEELING OF CHEST TIGHTNESS: ICD-10-CM

## 2025-04-17 DIAGNOSIS — J01.90 ACUTE RHINOSINUSITIS: ICD-10-CM

## 2025-04-17 DIAGNOSIS — R53.83 FATIGUE, UNSPECIFIED TYPE: ICD-10-CM

## 2025-04-17 PROCEDURE — 99213 OFFICE O/P EST LOW 20 MIN: CPT | Mod: S$GLB,,,

## 2025-04-17 RX ORDER — IPRATROPIUM BROMIDE 21 UG/1
1 SPRAY, METERED NASAL 2 TIMES DAILY
Qty: 30 ML | Refills: 0 | Status: SHIPPED | OUTPATIENT
Start: 2025-04-17

## 2025-04-17 RX ORDER — CETIRIZINE HYDROCHLORIDE 10 MG/1
10 TABLET ORAL DAILY
Qty: 30 TABLET | Refills: 0 | Status: SHIPPED | OUTPATIENT
Start: 2025-04-17 | End: 2025-05-17

## 2025-04-17 RX ORDER — PREDNISONE 20 MG/1
20 TABLET ORAL 2 TIMES DAILY
Qty: 10 TABLET | Refills: 0 | Status: SHIPPED | OUTPATIENT
Start: 2025-04-17 | End: 2025-04-22

## 2025-04-17 NOTE — PROGRESS NOTES
"Subjective:      Patient ID: Purvi Ann is a 34 y.o. female.    Vitals:  height is 5' 6" (1.676 m) and weight is 105.7 kg (233 lb). Her oral temperature is 98 °F (36.7 °C). Her blood pressure is 116/84 and her pulse is 70. Her respiration is 20 and oxygen saturation is 98%.     Chief Complaint: Sinus Problem and seen 4/11    34-year-old female  presents to the clinic today with chief complaint of nasal congestion, headache, body aches, left ear pain, chills, chest tightness.  Symptoms started on 4/11/25 ago and have  not improved since her  visit, so she has returned back to clinic.  She is not currently breastfeeding, but has been struggling with fatigue due to new baby.  Patient has been using her albuterol inhaler, ibuprofen, dayquil.  She states she did feel better after she was seen but she did clean the house again with a mask and still having sx. Denies any recent ill exposures. Denies any recent travel.  She does have a history of seasonal allergies. Denies hx of asthma.  Denies any pain or radiation of pain. Denies fever,  chest pain, shortness of breath, wheezing, abdominal pain, nausea, vomiting, diarrhea, or rashes.      Sinus Problem  This is a recurrent problem. The current episode started in the past 7 days. The problem is unchanged. Associated symptoms include chills, congestion, ear pain and headaches. Pertinent negatives include no coughing, diaphoresis, shortness of breath, sinus pressure or sore throat.       Constitution: Positive for chills. Negative for activity change, appetite change, sweating, fatigue, fever, generalized weakness and international travel in last 60 days.   HENT:  Positive for ear pain and congestion. Negative for postnasal drip, sinus pain, sinus pressure, sore throat and trouble swallowing.    Cardiovascular:  Negative for chest pain.   Respiratory:  Positive for chest tightness. Negative for cough, sputum production, shortness of breath, wheezing and " asthma.    Gastrointestinal:  Negative for abdominal pain, nausea, vomiting and diarrhea.   Musculoskeletal:  Positive for muscle cramps. Negative for muscle ache.   Skin:  Negative for rash.   Allergic/Immunologic: Positive for environmental allergies and seasonal allergies. Negative for asthma.   Neurological:  Positive for headaches.      Objective:     Physical Exam   Constitutional: She is oriented to person, place, and time. She appears well-developed. She is cooperative.  Non-toxic appearance. She appears ill. No distress.   HENT:   Head: Normocephalic and atraumatic.   Ears:   Right Ear: Hearing, external ear and ear canal normal. A middle ear effusion is present.   Left Ear: Hearing, external ear and ear canal normal. A middle ear effusion is present.   Nose: Nose normal. No mucosal edema, rhinorrhea, purulent discharge or nasal deformity. No epistaxis. Right sinus exhibits no maxillary sinus tenderness and no frontal sinus tenderness. Left sinus exhibits no maxillary sinus tenderness and no frontal sinus tenderness.   Mouth/Throat: Uvula is midline, oropharynx is clear and moist and mucous membranes are normal. No trismus in the jaw. Normal dentition. No uvula swelling. Cobblestoning present. No oropharyngeal exudate, posterior oropharyngeal edema, posterior oropharyngeal erythema or tonsillar abscesses. Tonsils are 0 on the right. Tonsils are 0 on the left. No tonsillar exudate.   Right nasal turbinate swollen and erythematous.       Comments: Right nasal turbinate swollen and erythematous.   Eyes: Conjunctivae and lids are normal. No scleral icterus. Extraocular movement intact   Neck: Trachea normal and phonation normal. Neck supple. No edema present. No erythema present. No neck rigidity present.   Cardiovascular: Normal rate, regular rhythm, normal heart sounds and normal pulses.   Pulmonary/Chest: Effort normal and breath sounds normal. No stridor. No respiratory distress. She has no decreased breath  sounds. She has no wheezes. She has no rhonchi. She has no rales.   Abdominal: Normal appearance.   Musculoskeletal: Normal range of motion.         General: No deformity. Normal range of motion.   Lymphadenopathy:     She has no cervical adenopathy.   Neurological: She is alert and oriented to person, place, and time. She exhibits normal muscle tone. Coordination normal.   Skin: Skin is warm, dry, intact, not diaphoretic and not pale.   Psychiatric: Her speech is normal and behavior is normal. Judgment and thought content normal.   Nursing note and vitals reviewed.      Assessment:     1. Acute middle ear effusion, unspecified laterality    2. Feeling of chest tightness    3. Acute rhinosinusitis    4. Fatigue, unspecified type        Plan:       Acute middle ear effusion, unspecified laterality  -     cetirizine (ZYRTEC) 10 MG tablet; Take 1 tablet (10 mg total) by mouth once daily.  Dispense: 30 tablet; Refill: 0  -     predniSONE (DELTASONE) 20 MG tablet; Take 1 tablet (20 mg total) by mouth 2 (two) times daily. for 5 days  Dispense: 10 tablet; Refill: 0    Feeling of chest tightness  -     predniSONE (DELTASONE) 20 MG tablet; Take 1 tablet (20 mg total) by mouth 2 (two) times daily. for 5 days  Dispense: 10 tablet; Refill: 0    Acute rhinosinusitis  -     cetirizine (ZYRTEC) 10 MG tablet; Take 1 tablet (10 mg total) by mouth once daily.  Dispense: 30 tablet; Refill: 0  -     ipratropium (ATROVENT) 21 mcg (0.03 %) nasal spray; 1 spray by Each Nostril route 2 (two) times daily.  Dispense: 30 mL; Refill: 0    Fatigue, unspecified type

## 2025-04-17 NOTE — PATIENT INSTRUCTIONS
The CDC recommends should the patient develop a fever or starts to feel worse after they have returned to normal activities, they should return home and away from others for at least another 24 hours.     Please drink plenty of fluids.  Please get plenty of rest.  Please return here or go to the Emergency Department for any concerns or worsening of condition.  If you were prescribed prednisone, please take them to completion.  Recommended using albuterol inhaler at least once today and then starting tomorrow use as needed every 4-6 hours. Use atrovent nasal spray twice daily (use 30 minutes before bedtime at night) and take daily zyrtec as directed for five-ten days. Recommended taking vitamin D and zinc supplements for immune support.     Recommended for patient to drink hot tea with honey. Consider eating softer foods such as soup and broth for the next couple of days to prevent further throat irritation. Recommended for patient to refrain from acidic foods (such as tomatoes or caffeine) to prevent throat irritation for the next couple of days.   Recommend switching out tooth brushes once patient feels better. Recommend cleaning house using disinfectant and washing sheets once patient is healed. Recommend healthy diet, smoothie detox, and tumeric or ginger juice shots either while patient is sick or after patient has healed to help with overall inflammation and immunity support.   Recommend humidifier, hot showers for sinus drainage. Recommend elevating your head at night with two pillows to prevent post nasal drip and cough at night.   If not allergic, please take over the counter Tylenol (Acetaminophen) and/or Motrin (Ibuprofen) as directed for control of pain and/or fever.  Please follow up with your primary care doctor or specialist as needed.    If you  smoke, please stop smoking.

## 2025-04-21 ENCOUNTER — TELEPHONE (OUTPATIENT)
Dept: OBSTETRICS AND GYNECOLOGY | Facility: CLINIC | Age: 35
End: 2025-04-21
Payer: MEDICAID

## 2025-04-21 ENCOUNTER — RESULTS FOLLOW-UP (OUTPATIENT)
Dept: OBSTETRICS AND GYNECOLOGY | Facility: CLINIC | Age: 35
End: 2025-04-21

## 2025-04-21 NOTE — TELEPHONE ENCOUNTER
Tried contacting patient in regards to pap smear results. Left voicemail for patient to contact office back.

## 2025-04-21 NOTE — TELEPHONE ENCOUNTER
Patient was contacted advised of results, and scheduled procedure. Patient expressed understanding.

## 2025-04-22 ENCOUNTER — TELEPHONE (OUTPATIENT)
Dept: OBSTETRICS AND GYNECOLOGY | Facility: CLINIC | Age: 35
End: 2025-04-22
Payer: MEDICAID

## 2025-04-22 NOTE — TELEPHONE ENCOUNTER
----- Message from Huyen sent at 4/22/2025 12:01 PM CDT -----  Regarding: restriction note for work  Name of Who is Calling:pt What is the request in detail: pt had c section 2/13. She is asking if she can get a note from you for disability stating that she can return 6/1  She states her job wants her to  50 to 75 bags. She is asking if you can please place a note in her portal excusing her until 6/1. Any questions please call to assist Can the clinic reply by [facility]:What Number to Call Back if not in [facility]: [phone]

## 2025-05-02 ENCOUNTER — PATIENT MESSAGE (OUTPATIENT)
Dept: LACTATION | Facility: CLINIC | Age: 35
End: 2025-05-02
Payer: MEDICAID

## 2025-05-08 ENCOUNTER — PROCEDURE VISIT (OUTPATIENT)
Dept: OBSTETRICS AND GYNECOLOGY | Facility: CLINIC | Age: 35
End: 2025-05-08
Payer: MEDICAID

## 2025-05-08 VITALS
BODY MASS INDEX: 36.81 KG/M2 | WEIGHT: 229.06 LBS | SYSTOLIC BLOOD PRESSURE: 120 MMHG | DIASTOLIC BLOOD PRESSURE: 84 MMHG | HEIGHT: 66 IN

## 2025-05-08 DIAGNOSIS — N76.0 ACUTE VAGINITIS: ICD-10-CM

## 2025-05-08 DIAGNOSIS — B97.7 HIGH RISK HUMAN PAPILLOMA VIRUS (HPV) INFECTION OF CERVIX: Primary | ICD-10-CM

## 2025-05-08 DIAGNOSIS — N72 HIGH RISK HUMAN PAPILLOMA VIRUS (HPV) INFECTION OF CERVIX: Primary | ICD-10-CM

## 2025-05-08 PROCEDURE — 99499 UNLISTED E&M SERVICE: CPT | Mod: S$PBB,,, | Performed by: STUDENT IN AN ORGANIZED HEALTH CARE EDUCATION/TRAINING PROGRAM

## 2025-05-08 PROCEDURE — 57454 BX/CURETT OF CERVIX W/SCOPE: CPT | Mod: PBBFAC,PN | Performed by: STUDENT IN AN ORGANIZED HEALTH CARE EDUCATION/TRAINING PROGRAM

## 2025-05-08 PROCEDURE — 87086 URINE CULTURE/COLONY COUNT: CPT | Performed by: STUDENT IN AN ORGANIZED HEALTH CARE EDUCATION/TRAINING PROGRAM

## 2025-05-08 RX ORDER — AMOXICILLIN 500 MG/1
500 TABLET, FILM COATED ORAL EVERY 12 HOURS
COMMUNITY

## 2025-05-08 NOTE — PROCEDURES
Colposcopy    Date/Time: 2025 10:15 AM    Performed by: Ellen Han MD  Authorized by: Ellen Han MD    Consent obatined:  Prior to procedure the appropriate consent was completed and verified    Colposcopy Site:  Cervix  Acrowhite Lesion? Yes    Atypical Vessels: No    Transformation Zone Adequate?: Yes    Biopsy?: Yes         Location:  Cervix ((1 00))  ECC Performed?: Yes    LEEP Performed?: No    Estimated blood loss (cc):  2    COLPOSCOPY PROCEDURE NOTE:  2025     Purvi Ann is a 34 y.o. female   presents for colposcopy.  No LMP recorded (lmp unknown)..  Her most recent pap smear shows no abnormalities. HPV was other high risk positive.    The abnormal test findings were discussed, as well as HPV infection, need for colposcopy and possible biopsies to determine the plan of care, treatments available, the minimal risk of bleeding and infection with colposcopy, and alternatives to colposcopy and she agrees to proceed. Written consents signed. UPT is negative.    COLPOSCOPY EXAM:     Timeout performed     The vulva and vagina were grossly inspected and found to be normal.    No visible lesions, No mosaicism, No punctation, and No abnormal vasculature  Acetowhite lesion(s) at 1 o'clock  Squamocolumnar junction visualized: Yes.  Biopsy was taken at 1 o'clock.  ECC was performed.    Hemostasis was adequate with application of silver nitrate.  The speculum was removed.  The patient did tolerate the procedure well.    All collected specimens sent to pathology for histologic analysis.    Colposcopic impression: low-grade.    Post-colposcopy counseling:  The patient was instructed to manage post-colposcopy cramping with NSAIDs or Tylenol, or with a prescription per the medication card.  Avoid intercourse, douching, or tampons in the vagina for at least 2-3 days.  Expect a clumpy blackish discharge due to Monsel's solution application for several days.  Report heavy  bleeding, worsening pain or pain that does not respond to above medications, or foul-smelling vaginal discharge. HPV vaccine recommended according to FDA age guidelines.  Importance of follow-up stressed.      Follow up based on colposcopy results.    Ellen Han

## 2025-05-10 LAB — BACTERIA UR CULT: NO GROWTH

## 2025-05-13 ENCOUNTER — RESULTS FOLLOW-UP (OUTPATIENT)
Dept: OBSTETRICS AND GYNECOLOGY | Facility: CLINIC | Age: 35
End: 2025-05-13

## 2025-06-22 ENCOUNTER — PATIENT MESSAGE (OUTPATIENT)
Dept: OBSTETRICS AND GYNECOLOGY | Facility: OTHER | Age: 35
End: 2025-06-22
Payer: MEDICAID

## 2025-08-10 ENCOUNTER — PATIENT MESSAGE (OUTPATIENT)
Dept: LACTATION | Facility: CLINIC | Age: 35
End: 2025-08-10
Payer: MEDICAID

## 2025-08-13 ENCOUNTER — HOSPITAL ENCOUNTER (EMERGENCY)
Facility: OTHER | Age: 35
Discharge: HOME | End: 2025-08-13
Attending: EMERGENCY MEDICINE
Payer: MEDICAID

## 2025-08-13 VITALS
HEART RATE: 97 BPM | WEIGHT: 242 LBS | OXYGEN SATURATION: 95 % | RESPIRATION RATE: 15 BRPM | HEIGHT: 67 IN | DIASTOLIC BLOOD PRESSURE: 64 MMHG | BODY MASS INDEX: 37.98 KG/M2 | SYSTOLIC BLOOD PRESSURE: 109 MMHG | TEMPERATURE: 101 F

## 2025-08-13 DIAGNOSIS — R42 DIZZINESS: ICD-10-CM

## 2025-08-13 DIAGNOSIS — B34.9 VIRAL SYNDROME: Primary | ICD-10-CM

## 2025-08-13 LAB
B-HCG UR QL: NEGATIVE
CTP QC/QA: YES
OHS QRS DURATION: 78 MS
OHS QTC CALCULATION: 435 MS
POC MOLECULAR INFLUENZA A AGN: NEGATIVE
POC MOLECULAR INFLUENZA B AGN: NEGATIVE
POCT GLUCOSE: 96 MG/DL (ref 70–110)
SARS-COV-2 RDRP RESP QL NAA+PROBE: NEGATIVE

## 2025-08-13 PROCEDURE — 93005 ELECTROCARDIOGRAM TRACING: CPT

## 2025-08-13 PROCEDURE — 81025 URINE PREGNANCY TEST: CPT | Performed by: EMERGENCY MEDICINE

## 2025-08-13 PROCEDURE — 87635 SARS-COV-2 COVID-19 AMP PRB: CPT | Performed by: EMERGENCY MEDICINE

## 2025-08-13 PROCEDURE — 82962 GLUCOSE BLOOD TEST: CPT

## 2025-08-13 PROCEDURE — 25000003 PHARM REV CODE 250: Performed by: EMERGENCY MEDICINE

## 2025-08-13 PROCEDURE — 93010 ELECTROCARDIOGRAM REPORT: CPT | Mod: ,,, | Performed by: INTERNAL MEDICINE

## 2025-08-13 PROCEDURE — 99283 EMERGENCY DEPT VISIT LOW MDM: CPT | Mod: 25

## 2025-08-13 RX ORDER — ONDANSETRON 4 MG/1
4 TABLET, ORALLY DISINTEGRATING ORAL
Status: COMPLETED | OUTPATIENT
Start: 2025-08-13 | End: 2025-08-13

## 2025-08-13 RX ORDER — IBUPROFEN 400 MG/1
800 TABLET, FILM COATED ORAL
Status: COMPLETED | OUTPATIENT
Start: 2025-08-13 | End: 2025-08-13

## 2025-08-13 RX ORDER — ACETAMINOPHEN 500 MG
1000 TABLET ORAL
Status: COMPLETED | OUTPATIENT
Start: 2025-08-13 | End: 2025-08-13

## 2025-08-13 RX ADMIN — IBUPROFEN 800 MG: 400 TABLET ORAL at 11:08

## 2025-08-13 RX ADMIN — ONDANSETRON 4 MG: 4 TABLET, ORALLY DISINTEGRATING ORAL at 11:08

## 2025-08-13 RX ADMIN — ACETAMINOPHEN 1000 MG: 500 TABLET, FILM COATED ORAL at 11:08

## 2025-08-26 ENCOUNTER — OFFICE VISIT (OUTPATIENT)
Dept: INTERNAL MEDICINE | Facility: CLINIC | Age: 35
End: 2025-08-26
Payer: MEDICAID

## 2025-08-26 ENCOUNTER — LAB VISIT (OUTPATIENT)
Dept: LAB | Facility: OTHER | Age: 35
End: 2025-08-26
Attending: STUDENT IN AN ORGANIZED HEALTH CARE EDUCATION/TRAINING PROGRAM
Payer: MEDICAID

## 2025-08-26 VITALS
HEIGHT: 67 IN | DIASTOLIC BLOOD PRESSURE: 78 MMHG | OXYGEN SATURATION: 98 % | HEART RATE: 98 BPM | WEIGHT: 230.38 LBS | BODY MASS INDEX: 36.16 KG/M2 | SYSTOLIC BLOOD PRESSURE: 109 MMHG

## 2025-08-26 DIAGNOSIS — G47.00 INSOMNIA, UNSPECIFIED TYPE: ICD-10-CM

## 2025-08-26 DIAGNOSIS — D62 ABLA (ACUTE BLOOD LOSS ANEMIA): ICD-10-CM

## 2025-08-26 DIAGNOSIS — Z00.00 ANNUAL PHYSICAL EXAM: Primary | ICD-10-CM

## 2025-08-26 DIAGNOSIS — Z56.6 STRESS AT WORK: ICD-10-CM

## 2025-08-26 DIAGNOSIS — O99.019 ANEMIA DURING PREGNANCY: ICD-10-CM

## 2025-08-26 DIAGNOSIS — Z63.6 CAREGIVER STRESS: ICD-10-CM

## 2025-08-26 DIAGNOSIS — K59.00 CONSTIPATION, UNSPECIFIED CONSTIPATION TYPE: ICD-10-CM

## 2025-08-26 DIAGNOSIS — E66.01 SEVERE OBESITY DUE TO EXCESS CALORIES AFFECTING PREGNANCY, ANTEPARTUM: ICD-10-CM

## 2025-08-26 DIAGNOSIS — D75.839 THROMBOCYTHEMIA: ICD-10-CM

## 2025-08-26 DIAGNOSIS — O99.210 SEVERE OBESITY DUE TO EXCESS CALORIES AFFECTING PREGNANCY, ANTEPARTUM: ICD-10-CM

## 2025-08-26 DIAGNOSIS — Z98.891 STATUS POST PRIMARY LOW TRANSVERSE CESAREAN SECTION: ICD-10-CM

## 2025-08-26 DIAGNOSIS — F43.21 ADJUSTMENT DISORDER WITH DEPRESSED MOOD: ICD-10-CM

## 2025-08-26 PROCEDURE — 99213 OFFICE O/P EST LOW 20 MIN: CPT | Mod: PBBFAC | Performed by: STUDENT IN AN ORGANIZED HEALTH CARE EDUCATION/TRAINING PROGRAM

## 2025-08-26 PROCEDURE — 99999 PR PBB SHADOW E&M-EST. PATIENT-LVL III: CPT | Mod: PBBFAC,,, | Performed by: STUDENT IN AN ORGANIZED HEALTH CARE EDUCATION/TRAINING PROGRAM

## 2025-08-26 RX ORDER — TRAZODONE HYDROCHLORIDE 50 MG/1
TABLET ORAL
Qty: 60 TABLET | Refills: 11 | Status: SHIPPED | OUTPATIENT
Start: 2025-08-26

## 2025-08-26 SDOH — SOCIAL DETERMINANTS OF HEALTH (SDOH): OTHER PHYSICAL AND MENTAL STRAIN RELATED TO WORK: Z56.6

## 2025-08-26 SDOH — SOCIAL DETERMINANTS OF HEALTH (SDOH): DEPENDENT RELATIVE NEEDING CARE AT HOME: Z63.6

## 2025-09-02 ENCOUNTER — TELEPHONE (OUTPATIENT)
Dept: INTERNAL MEDICINE | Facility: CLINIC | Age: 35
End: 2025-09-02
Payer: MEDICAID

## (undated) DEVICE — ELECTRODE PATIENT RETURN DISP